# Patient Record
Sex: MALE | Race: OTHER | Employment: FULL TIME | URBAN - METROPOLITAN AREA
[De-identification: names, ages, dates, MRNs, and addresses within clinical notes are randomized per-mention and may not be internally consistent; named-entity substitution may affect disease eponyms.]

---

## 2017-01-17 ENCOUNTER — ALLSCRIPTS OFFICE VISIT (OUTPATIENT)
Dept: OTHER | Facility: OTHER | Age: 37
End: 2017-01-17

## 2017-01-17 DIAGNOSIS — I10 ESSENTIAL (PRIMARY) HYPERTENSION: ICD-10-CM

## 2017-04-04 ENCOUNTER — APPOINTMENT (OUTPATIENT)
Dept: LAB | Facility: CLINIC | Age: 37
End: 2017-04-04
Payer: COMMERCIAL

## 2017-04-04 ENCOUNTER — GENERIC CONVERSION - ENCOUNTER (OUTPATIENT)
Dept: OTHER | Facility: OTHER | Age: 37
End: 2017-04-04

## 2017-04-04 DIAGNOSIS — I10 ESSENTIAL (PRIMARY) HYPERTENSION: ICD-10-CM

## 2017-04-04 LAB
ALBUMIN SERPL BCP-MCNC: 4.1 G/DL (ref 3.5–5)
ALP SERPL-CCNC: 106 U/L (ref 46–116)
ALT SERPL W P-5'-P-CCNC: 45 U/L (ref 12–78)
ANION GAP SERPL CALCULATED.3IONS-SCNC: 6 MMOL/L (ref 4–13)
AST SERPL W P-5'-P-CCNC: 25 U/L (ref 5–45)
BILIRUB SERPL-MCNC: 0.56 MG/DL (ref 0.2–1)
BUN SERPL-MCNC: 11 MG/DL (ref 5–25)
CALCIUM SERPL-MCNC: 8.9 MG/DL (ref 8.3–10.1)
CHLORIDE SERPL-SCNC: 105 MMOL/L (ref 100–108)
CHOLEST SERPL-MCNC: 235 MG/DL (ref 50–200)
CO2 SERPL-SCNC: 27 MMOL/L (ref 21–32)
CREAT SERPL-MCNC: 0.81 MG/DL (ref 0.6–1.3)
CREAT UR-MCNC: 219 MG/DL
GFR SERPL CREATININE-BSD FRML MDRD: >60 ML/MIN/1.73SQ M
GLUCOSE P FAST SERPL-MCNC: 82 MG/DL (ref 65–99)
HDLC SERPL-MCNC: 37 MG/DL (ref 40–60)
LDLC SERPL CALC-MCNC: 162 MG/DL (ref 0–100)
POTASSIUM SERPL-SCNC: 3.9 MMOL/L (ref 3.5–5.3)
PROT SERPL-MCNC: 8.3 G/DL (ref 6.4–8.2)
PROT UR-MCNC: 12 MG/DL
PROT/CREAT UR: 0.05 MG/G{CREAT} (ref 0–0.1)
SODIUM SERPL-SCNC: 138 MMOL/L (ref 136–145)
TRIGL SERPL-MCNC: 181 MG/DL
TSH SERPL DL<=0.05 MIU/L-ACNC: 0.53 UIU/ML (ref 0.36–3.74)

## 2017-04-04 PROCEDURE — 82570 ASSAY OF URINE CREATININE: CPT

## 2017-04-04 PROCEDURE — 84443 ASSAY THYROID STIM HORMONE: CPT

## 2017-04-04 PROCEDURE — 80053 COMPREHEN METABOLIC PANEL: CPT

## 2017-04-04 PROCEDURE — 36415 COLL VENOUS BLD VENIPUNCTURE: CPT

## 2017-04-04 PROCEDURE — 84156 ASSAY OF PROTEIN URINE: CPT

## 2017-04-04 PROCEDURE — 80061 LIPID PANEL: CPT

## 2017-04-20 ENCOUNTER — ALLSCRIPTS OFFICE VISIT (OUTPATIENT)
Dept: OTHER | Facility: OTHER | Age: 37
End: 2017-04-20

## 2017-04-20 DIAGNOSIS — E78.2 MIXED HYPERLIPIDEMIA: ICD-10-CM

## 2018-01-12 VITALS
SYSTOLIC BLOOD PRESSURE: 124 MMHG | DIASTOLIC BLOOD PRESSURE: 72 MMHG | HEART RATE: 78 BPM | OXYGEN SATURATION: 98 % | WEIGHT: 212 LBS | BODY MASS INDEX: 31.4 KG/M2 | HEIGHT: 69 IN

## 2018-01-12 NOTE — RESULT NOTES
Verified Results  (1) COMPREHENSIVE METABOLIC PANEL [id number] 48:00NC Uday Sawant Order Number: [id number]     Test Name Result Flag Reference   SODIUM 138 mmol/L  136-145   POTASSIUM 3 9 mmol/L  3 5-5 3   CHLORIDE 105 mmol/L  100-108   CARBON DIOXIDE 27 mmol/L  21-32   ANION GAP (CALC) 6 mmol/L  4-13   BLOOD UREA NITROGEN 11 mg/dL  5-25   CREATININE 0 81 mg/dL  0 60-1 30   Standardized to IDMS reference method   CALCIUM 8 9 mg/dL  8 3-10 1   BILI, TOTAL 0 56 mg/dL  0 20-1 00   ALK PHOSPHATAS 106 U/L     ALT (SGPT) 45 U/L  12-78   AST(SGOT) 25 U/L  5-45   ALBUMIN 4 1 g/dL  3 5-5 0   TOTAL PROTEIN 8 3 g/dL H 6 4-8 2   eGFR Non-African American      >60 0 ml/min/1 73sq m   - Patient Instructions: This is a fasting blood test  Water,black tea or black  coffee only after 9:00pm the night before test Drink 2 glasses of water the morning of test - Patient Instructions: This bloodwork is non-fasting  Please drink two glasses of   water morning of bloodwork  National Kidney Disease Education Program recommendations are as follows:  GFR calculation is accurate only with a steady state creatinine  Chronic Kidney disease less than 60 ml/min/1 73 sq  meters  Kidney failure less than 15 ml/min/1 73 sq  meters  GLUCOSE FASTING 82 mg/dL  65-99     (1) LIPID PANEL, FASTING [id number] 08:55AM Uday Sawant Order Number: [id number]     Test Name Result Flag Reference   CHOLESTEROL 235 mg/dL H    HDL,DIRECT 37 mg/dL L 40-60   Specimen collection should occur prior to Metamizole administration due to the potential for falsely depressed results  LDL CHOLESTEROL CALCULATED 162 mg/dL H 0-100   - Patient Instructions: This is a fasting blood test  Water,black tea or black  coffee only after 9:00pm the night before test   Drink 2 glasses of water the morning of test     - Patient Instructions:  This is a fasting blood test  Water,black tea or black  coffee only after 9:00pm the night before test Drink 2 glasses of water the morning of test - Patient Instructions: This bloodwork is non-fasting  Please drink two glasses of   water morning of bloodwork  Triglyceride:         Normal              <150 mg/dl       Borderline High    150-199 mg/dl       High               200-499 mg/dl       Very High          >499 mg/dl  Cholesterol:         Desirable        <200 mg/dl      Borderline High  200-239 mg/dl      High             >239 mg/dl  HDL Cholesterol:        High    >59 mg/dL      Low     <41 mg/dL  LDL CALCULATED:    This screening LDL is a calculated result  It does not have the accuracy of the Direct Measured LDL in the monitoring of patients with hyperlipidemia and/or statin therapy  Direct Measure LDL (SRN262) must be ordered separately in these patients  TRIGLYCERIDES 181 mg/dL H <=150   Specimen collection should occur prior to N-Acetylcysteine or Metamizole administration due to the potential for falsely depressed results  (1) TSH 04Apr2017 08:55AM Reji Colon Order Number: [id number]     Test Name Result Flag Reference   TSH 0 530 uIU/mL  0 358-3 740   - Patient Instructions: This bloodwork is non-fasting  Please drink two glasses of water morning of bloodwork  - Patient Instructions: This is a fasting blood test  Water,black tea or black  coffee only after 9:00pm the night before test Drink 2 glasses of water the morning of test - Patient Instructions: This bloodwork is non-fasting  Please drink two glasses of   water morning of bloodwork  Patients undergoing fluorescein dye angiography may retain small amounts of fluorescein in the body for 48-72 hours post procedure  Samples containing fluorescein can produce falsely depressed TSH values  If the patient had this procedure,a specimen should be resubmitted post fluorescein clearance       (1) URINE PROTEIN CREATININE RATIO [id number] 08:55AM Reji Colon Order Number: [id number]     Test Name Result Flag Reference   CREATININE URINE 219 0 mg/dL     URINE PROTEIN:CREATININE RATIO 0 05  0 00-0 10   URINE PROTEIN 12 mg/dL         Discussion/Summary   inform chol is elevated    will discuss at f/u

## 2018-01-13 VITALS
BODY MASS INDEX: 32.92 KG/M2 | DIASTOLIC BLOOD PRESSURE: 78 MMHG | WEIGHT: 222.25 LBS | HEART RATE: 72 BPM | HEIGHT: 69 IN | OXYGEN SATURATION: 95 % | SYSTOLIC BLOOD PRESSURE: 120 MMHG

## 2018-03-22 ENCOUNTER — TELEPHONE (OUTPATIENT)
Dept: FAMILY MEDICINE CLINIC | Facility: CLINIC | Age: 38
End: 2018-03-22

## 2018-03-23 DIAGNOSIS — Z00.00 WELL ADULT EXAM: Primary | ICD-10-CM

## 2018-04-10 ENCOUNTER — LAB (OUTPATIENT)
Dept: LAB | Facility: CLINIC | Age: 38
End: 2018-04-10
Payer: COMMERCIAL

## 2018-04-10 DIAGNOSIS — Z00.00 WELL ADULT EXAM: ICD-10-CM

## 2018-04-10 LAB
ALBUMIN SERPL BCP-MCNC: 4.1 G/DL (ref 3.5–5)
ALP SERPL-CCNC: 88 U/L (ref 46–116)
ALT SERPL W P-5'-P-CCNC: 39 U/L (ref 12–78)
ANION GAP SERPL CALCULATED.3IONS-SCNC: 5 MMOL/L (ref 4–13)
AST SERPL W P-5'-P-CCNC: 25 U/L (ref 5–45)
BASOPHILS # BLD AUTO: 0.03 THOUSANDS/ΜL (ref 0–0.1)
BASOPHILS NFR BLD AUTO: 0 % (ref 0–1)
BILIRUB SERPL-MCNC: 0.47 MG/DL (ref 0.2–1)
BUN SERPL-MCNC: 14 MG/DL (ref 5–25)
CALCIUM SERPL-MCNC: 8.4 MG/DL
CHLORIDE SERPL-SCNC: 107 MMOL/L (ref 100–108)
CHOLEST SERPL-MCNC: 236 MG/DL (ref 50–200)
CO2 SERPL-SCNC: 26 MMOL/L (ref 21–32)
CREAT SERPL-MCNC: 0.76 MG/DL (ref 0.6–1.3)
EOSINOPHIL # BLD AUTO: 0.13 THOUSAND/ΜL (ref 0–0.61)
EOSINOPHIL NFR BLD AUTO: 2 % (ref 0–6)
ERYTHROCYTE [DISTWIDTH] IN BLOOD BY AUTOMATED COUNT: 12 % (ref 11.6–15.1)
GFR SERPL CREATININE-BSD FRML MDRD: 115 ML/MIN/1.73SQ M
GLUCOSE P FAST SERPL-MCNC: 84 MG/DL (ref 65–99)
HCT VFR BLD AUTO: 43.3 % (ref 36.5–49.3)
HDLC SERPL-MCNC: 46 MG/DL (ref 40–60)
HGB BLD-MCNC: 13.2 G/DL (ref 12–17)
LDLC SERPL CALC-MCNC: 158 MG/DL (ref 0–100)
LYMPHOCYTES # BLD AUTO: 1.62 THOUSANDS/ΜL (ref 0.6–4.47)
LYMPHOCYTES NFR BLD AUTO: 23 % (ref 14–44)
MCH RBC QN AUTO: 27.9 PG (ref 26.8–34.3)
MCHC RBC AUTO-ENTMCNC: 30.5 G/DL (ref 31.4–37.4)
MCV RBC AUTO: 92 FL (ref 82–98)
MONOCYTES # BLD AUTO: 0.35 THOUSAND/ΜL (ref 0.17–1.22)
MONOCYTES NFR BLD AUTO: 5 % (ref 4–12)
NEUTROPHILS # BLD AUTO: 5.03 THOUSANDS/ΜL (ref 1.85–7.62)
NEUTS SEG NFR BLD AUTO: 70 % (ref 43–75)
NRBC BLD AUTO-RTO: 0 /100 WBCS
PLATELET # BLD AUTO: 360 THOUSANDS/UL (ref 149–390)
PMV BLD AUTO: 10.1 FL (ref 8.9–12.7)
POTASSIUM SERPL-SCNC: 4.2 MMOL/L (ref 3.5–5.3)
PROT SERPL-MCNC: 8.4 G/DL (ref 6.4–8.2)
RBC # BLD AUTO: 4.73 MILLION/UL (ref 3.88–5.62)
SODIUM SERPL-SCNC: 138 MMOL/L (ref 136–145)
TRIGL SERPL-MCNC: 159 MG/DL
TSH SERPL DL<=0.05 MIU/L-ACNC: 0.88 UIU/ML (ref 0.36–3.74)
WBC # BLD AUTO: 7.18 THOUSAND/UL (ref 4.31–10.16)

## 2018-04-10 PROCEDURE — 80061 LIPID PANEL: CPT

## 2018-04-10 PROCEDURE — 85025 COMPLETE CBC W/AUTO DIFF WBC: CPT

## 2018-04-10 PROCEDURE — 36415 COLL VENOUS BLD VENIPUNCTURE: CPT

## 2018-04-10 PROCEDURE — 84443 ASSAY THYROID STIM HORMONE: CPT

## 2018-04-10 PROCEDURE — 80053 COMPREHEN METABOLIC PANEL: CPT

## 2018-04-19 ENCOUNTER — OFFICE VISIT (OUTPATIENT)
Dept: FAMILY MEDICINE CLINIC | Facility: CLINIC | Age: 38
End: 2018-04-19
Payer: COMMERCIAL

## 2018-04-19 VITALS
DIASTOLIC BLOOD PRESSURE: 84 MMHG | WEIGHT: 203 LBS | OXYGEN SATURATION: 98 % | HEIGHT: 68 IN | HEART RATE: 78 BPM | SYSTOLIC BLOOD PRESSURE: 140 MMHG | BODY MASS INDEX: 30.77 KG/M2

## 2018-04-19 DIAGNOSIS — R07.89 ATYPICAL CHEST PAIN: Primary | ICD-10-CM

## 2018-04-19 DIAGNOSIS — E78.2 MIXED HYPERLIPIDEMIA: ICD-10-CM

## 2018-04-19 DIAGNOSIS — I10 ESSENTIAL HYPERTENSION: ICD-10-CM

## 2018-04-19 PROCEDURE — 99214 OFFICE O/P EST MOD 30 MIN: CPT | Performed by: FAMILY MEDICINE

## 2018-04-19 RX ORDER — ROSUVASTATIN CALCIUM 5 MG/1
5 TABLET, COATED ORAL DAILY
Qty: 90 TABLET | Refills: 0 | Status: SHIPPED | OUTPATIENT
Start: 2018-04-19 | End: 2018-07-24 | Stop reason: SDUPTHER

## 2018-04-19 RX ORDER — LISINOPRIL 5 MG/1
5 TABLET ORAL DAILY
Qty: 90 TABLET | Refills: 0 | Status: SHIPPED | OUTPATIENT
Start: 2018-04-19 | End: 2018-05-02 | Stop reason: SDUPTHER

## 2018-04-19 RX ORDER — SENNOSIDES 8.6 MG
650 CAPSULE ORAL EVERY 8 HOURS PRN
Qty: 30 TABLET | Refills: 0 | Status: SHIPPED | OUTPATIENT
Start: 2018-04-19 | End: 2018-04-19 | Stop reason: CLARIF

## 2018-04-19 RX ORDER — ATORVASTATIN CALCIUM 10 MG/1
1 TABLET, FILM COATED ORAL
COMMUNITY
Start: 2017-04-20 | End: 2018-05-10 | Stop reason: SDUPTHER

## 2018-04-19 RX ORDER — LISINOPRIL 5 MG/1
1 TABLET ORAL DAILY
COMMUNITY
Start: 2017-08-30 | End: 2018-04-19 | Stop reason: SDUPTHER

## 2018-04-19 NOTE — PROGRESS NOTES
Assessment/Plan:   atypical chest pain  Discussed plan of care with patient, reviewed family history, discussed lack of compliance with control of cholesterol and hypertension,  Recommend restarting medications discussed goals of therapy in regard to hypertension reviewed standard of care for hyperlipidemia  In light of significant family history for cardiac disease will seek out evaluation with Cardiology  Reviewed dietary recommendations Mediterranean recommended baby aspirin  Daily, advise any changes to go to the emergency room   Diagnoses and all orders for this visit:    Atypical chest pain  -     Ambulatory referral to Cardiology; Future  -     Discontinue: acetaminophen (TYLENOL) 650 mg CR tablet; Take 1 tablet (650 mg total) by mouth every 8 (eight) hours as needed for mild pain  -     CK (with reflex to MB); Future    Mixed hyperlipidemia  -     Ambulatory referral to Cardiology; Future  -     Discontinue: acetaminophen (TYLENOL) 650 mg CR tablet; Take 1 tablet (650 mg total) by mouth every 8 (eight) hours as needed for mild pain    Essential hypertension  -     Ambulatory referral to Cardiology; Future  -     lisinopril (ZESTRIL) 5 mg tablet; Take 1 tablet (5 mg total) by mouth daily  -     rosuvastatin (CRESTOR) 5 mg tablet; Take 1 tablet (5 mg total) by mouth daily  -     AST; Future  -     ALT; Future  -     BUN; Future  -     Discontinue: acetaminophen (TYLENOL) 650 mg CR tablet; Take 1 tablet (650 mg total) by mouth every 8 (eight) hours as needed for mild pain  -     BUN; Future    Other orders  -     atorvastatin (LIPITOR) 10 mg tablet; Take 1 tablet by mouth  -     Discontinue: lisinopril (ZESTRIL) 5 mg tablet; Take 1 tablet by mouth daily              Subjective:      Patient ID: Simin Loco is a 43 y o  male      Here to f/u on the labs   Has been off the med in the hope Of not needing the med, but is concerned about my heart number overall health, has been working out exercising  regular much more watching diet, staying away from carbohydrates, has lost some weight  Brother recently passed secondary to MI 49yr , informed enlarged heart,  Apparently per the mother he also had stopped his medicines for high blood pressure and cholesterol  Since passing of the brother has been under increased stress , left job to seek out self employment ,      Has been having chest pain intermittant  Mid, without shortness of breath without radiation  Negative diaphoresis nausea vomiting associated,  IM really not sure  If it is in my head, has not stopped me from working out has not stopped me from going to the gym        The following portions of the patient's history were reviewed and updated as appropriate:   He has a past medical history of Hyperlipidemia ,   does not have a problem list on file  ,   has no past surgical history on file  ,  family history includes Diabetes in his father; Heart disease in his father; Hypertension in his mother  ,   reports that he has quit smoking  He has never used smokeless tobacco  He reports that he drinks alcohol  His drug history is not on file  ,  has No Known Allergies         Review of Systems   Constitutional: Negative for appetite change, chills, fever and unexpected weight change  HENT: Negative for congestion, dental problem, ear pain, hearing loss, postnasal drip, rhinorrhea, sinus pain, sinus pressure, sneezing, sore throat, tinnitus and voice change  Eyes: Negative for visual disturbance  Respiratory: Negative for apnea, cough, chest tightness and shortness of breath  Cardiovascular: Positive for chest pain  Negative for palpitations and leg swelling  Gastrointestinal: Negative for abdominal pain, blood in stool, constipation, diarrhea, nausea and vomiting  Endocrine: Negative for cold intolerance, heat intolerance, polydipsia, polyphagia and polyuria  Genitourinary: Negative for decreased urine volume, difficulty urinating, dysuria, frequency and hematuria  Musculoskeletal: Negative for arthralgias, back pain, gait problem, joint swelling and myalgias  Skin: Negative for color change, rash and wound  Allergic/Immunologic: Negative for environmental allergies and food allergies  Neurological: Negative for dizziness, syncope, weakness, light-headedness, numbness and headaches  Hematological: Negative for adenopathy  Does not bruise/bleed easily  Psychiatric/Behavioral: Negative for sleep disturbance and suicidal ideas  The patient is not nervous/anxious  Objective:  Vitals:    04/19/18 1109   BP: 140/84   Pulse: 78   SpO2: 98%      Physical Exam   Constitutional: He is oriented to person, place, and time  He appears well-developed and well-nourished  HENT:   Head: Normocephalic and atraumatic  Eyes: EOM are normal    Neck: Normal range of motion  Neck supple  Cardiovascular: Normal rate, regular rhythm and normal heart sounds  Pulmonary/Chest: Effort normal and breath sounds normal    Abdominal: Soft  Bowel sounds are normal    Musculoskeletal: Normal range of motion  Neurological: He is alert and oriented to person, place, and time  He has normal reflexes  Skin: Skin is warm and dry  Psychiatric: He has a normal mood and affect   His behavior is normal  Judgment and thought content normal     Nervous , perseverating over brothers passing

## 2018-04-25 ENCOUNTER — TELEPHONE (OUTPATIENT)
Dept: CARDIOLOGY CLINIC | Facility: CLINIC | Age: 38
End: 2018-04-25

## 2018-04-25 NOTE — TELEPHONE ENCOUNTER
I did not see that in the system, when I checked again I saw patient has two accounts  The patients wife does not want him to wait that long  IS it ok for patient to wait till 5/30?

## 2018-04-25 NOTE — TELEPHONE ENCOUNTER
Patients wife called and would like to schedule an appt for patient ASAP  Patients wife stated that patient has a referral for patient to be see ASAP  Patient is being referred for Essential Hypertension & Typical chest pain  PT does have a  family history of heart disease  PT is being referred by primary Dr Andi Marmolejo patient will be a NEWPT  PATIENTS  # [phone] Please advise were patient can be seen

## 2018-05-02 ENCOUNTER — CLINICAL SUPPORT (OUTPATIENT)
Dept: FAMILY MEDICINE CLINIC | Facility: CLINIC | Age: 38
End: 2018-05-02

## 2018-05-02 VITALS — SYSTOLIC BLOOD PRESSURE: 142 MMHG | DIASTOLIC BLOOD PRESSURE: 90 MMHG

## 2018-05-02 DIAGNOSIS — Z01.30 BLOOD PRESSURE CHECK: Primary | ICD-10-CM

## 2018-05-02 DIAGNOSIS — I10 ESSENTIAL HYPERTENSION: ICD-10-CM

## 2018-05-02 RX ORDER — LISINOPRIL 10 MG/1
10 TABLET ORAL DAILY
Qty: 30 TABLET | Refills: 3 | Status: SHIPPED | OUTPATIENT
Start: 2018-05-02 | End: 2018-07-03 | Stop reason: SDUPTHER

## 2018-05-10 ENCOUNTER — OFFICE VISIT (OUTPATIENT)
Dept: CARDIOLOGY CLINIC | Facility: CLINIC | Age: 38
End: 2018-05-10
Payer: COMMERCIAL

## 2018-05-10 VITALS
DIASTOLIC BLOOD PRESSURE: 96 MMHG | HEIGHT: 68 IN | WEIGHT: 203.6 LBS | SYSTOLIC BLOOD PRESSURE: 144 MMHG | BODY MASS INDEX: 30.86 KG/M2 | HEART RATE: 78 BPM | OXYGEN SATURATION: 96 %

## 2018-05-10 DIAGNOSIS — R07.89 ATYPICAL CHEST PAIN: ICD-10-CM

## 2018-05-10 DIAGNOSIS — I20.8 OTHER FORMS OF ANGINA PECTORIS (HCC): Primary | ICD-10-CM

## 2018-05-10 DIAGNOSIS — E78.2 MIXED HYPERLIPIDEMIA: ICD-10-CM

## 2018-05-10 DIAGNOSIS — I10 ESSENTIAL HYPERTENSION: ICD-10-CM

## 2018-05-10 PROBLEM — I25.9 CHEST PAIN DUE TO MYOCARDIAL ISCHEMIA: Status: ACTIVE | Noted: 2018-05-10

## 2018-05-10 PROCEDURE — 93000 ELECTROCARDIOGRAM COMPLETE: CPT | Performed by: INTERNAL MEDICINE

## 2018-05-10 PROCEDURE — 99203 OFFICE O/P NEW LOW 30 MIN: CPT | Performed by: INTERNAL MEDICINE

## 2018-05-10 RX ORDER — MULTIVITAMIN
1 TABLET ORAL DAILY
COMMUNITY

## 2018-05-10 RX ORDER — ASPIRIN 81 MG/1
81 TABLET ORAL DAILY
Qty: 90 TABLET | Refills: 3 | Status: SHIPPED | OUTPATIENT
Start: 2018-05-10 | End: 2019-05-31 | Stop reason: SDUPTHER

## 2018-05-10 NOTE — LETTER
May 10, 2018     Vicky Hanna, [address] [facility] [place] [address]    Patient: Remy Nesbitt   YOB: 1980   Date of Visit: 5/10/2018       Dear Dr Santosh Rosas: Thank you for referring Remy Nesbitt to me for evaluation  Below are my notes for this consultation  If you have questions, please do not hesitate to call me  I look forward to following your patient along with you  Sincerely,        Jesus Lopez MD        CC: No Recipients  Jesus Lopez MD  5/10/2018 12:46 PM  Sign at close encounter                                             Cardiology Consultation     Remy Nesbitt  [id number]  1980  [address] [place] [address]    1  Atypical chest pain  Ambulatory referral to Cardiology    POCT ECG   2  Mixed hyperlipidemia  Ambulatory referral to Cardiology   3  Essential hypertension  Ambulatory referral to Cardiology     C/C:  Chest pain and shortness of breath    HPI:  28-year-old male with past medical history of hypertension, hyperlipidemia cough and strong family history of premature coronary disease in father and brother comes to office for evaluation of new onset chest pain and shortness of breath  Patient says he gets episodes of sudden onset chest heaviness associated with shortness of breath at rest that lasts for about 15-20 minutes and resolves spontaneously with no treatment  He states the pain started over last few weeks after his brother  of heart attack  Patient has also lost his job and is under a lot of stress  He describes chest pain as pressure-like located in substernal region, mild-to-moderate intensity, no radiation, no aggravating or relieving factors  Patient does exercise every day and denies having any chest pain or shortness of breath on exertion  He denies other cardiac symptoms including palpitations, dizziness, syncope    He denies having any lower extremity edema, orthopnea paroxysmal nocturnal dyspnea  EKG showed normal sinus rhythm, normal EKG    Past Medical History:   Diagnosis Date    Atypical chest pain     Hyperlipidemia     last assessed 1/17/17     Social History     Social History    Marital status: Single     Spouse name: N/A    Number of children: N/A    Years of education: N/A     Occupational History    employed      Social History Main Topics    Smoking status: Former Smoker    Smokeless tobacco: Never Used    Alcohol use Yes      Comment: social    Drug use: No    Sexual activity: Not on file     Other Topics Concern    Not on file     Social History Narrative    Always uses seat belt     Daily caffeinated coffee consumption    Lives with family      Family History   Problem Relation Age of Onset    Hypertension Mother     Heart disease Father      cardiac d/o    Diabetes Father     Heart attack Brother      History reviewed  No pertinent surgical history      Current Outpatient Prescriptions:     lisinopril (ZESTRIL) 10 mg tablet, Take 1 tablet (10 mg total) by mouth daily, Disp: 30 tablet, Rfl: 3    Multiple Vitamin (MULTIVITAMIN) tablet, Take 1 tablet by mouth daily, Disp: , Rfl:     Omega-3 Fatty Acids (OMEGA 3 500 PO), Take by mouth daily, Disp: , Rfl:     rosuvastatin (CRESTOR) 5 mg tablet, Take 1 tablet (5 mg total) by mouth daily, Disp: 90 tablet, Rfl: 0  No Known Allergies  Vitals:    05/10/18 1142   BP: 144/96   Pulse: 78   SpO2: 96%   Weight: 92 4 kg (203 lb 9 6 oz)   Height: 5' 8" (1 727 m)       Labs:  Appointment on 04/10/2018   Component Date Value    Cholesterol 04/10/2018 236*    Triglycerides 04/10/2018 159*    HDL, Direct 04/10/2018 46     LDL Calculated 04/10/2018 158*    WBC 04/10/2018 7 18     RBC 04/10/2018 4 73     Hemoglobin 04/10/2018 13 2     Hematocrit 04/10/2018 43 3     MCV 04/10/2018 92     MCH 04/10/2018 27 9     MCHC 04/10/2018 30 5*    RDW 04/10/2018 12 0     MPV 04/10/2018 10 1     Platelets 36/19/1915 360     nRBC 04/10/2018 0     Neutrophils Relative 04/10/2018 70     Lymphocytes Relative 04/10/2018 23     Monocytes Relative 04/10/2018 5     Eosinophils Relative 04/10/2018 2     Basophils Relative 04/10/2018 0     Neutrophils Absolute 04/10/2018 5 03     Lymphocytes Absolute 04/10/2018 1 62     Monocytes Absolute 04/10/2018 0 35     Eosinophils Absolute 04/10/2018 0 13     Basophils Absolute 04/10/2018 0 03     Sodium 04/10/2018 138     Potassium 04/10/2018 4 2     Chloride 04/10/2018 107     CO2 04/10/2018 26     Anion Gap 04/10/2018 5     BUN 04/10/2018 14     Creatinine 04/10/2018 0 76     Glucose, Fasting 04/10/2018 84     Calcium 04/10/2018 8 4     AST 04/10/2018 25     ALT 04/10/2018 39     Alkaline Phosphatase 04/10/2018 88     Total Protein 04/10/2018 8 4*    Albumin 04/10/2018 4 1     Total Bilirubin 04/10/2018 0 47     eGFR 04/10/2018 115     TSH 3RD GENERATON 04/10/2018 0 883      Imaging: No results found  Review of Systems:  Review of Systems   Constitutional: Negative for diaphoresis and fatigue  HENT: Negative for congestion and facial swelling  Eyes: Negative for photophobia and visual disturbance  Respiratory: Positive for shortness of breath  Cardiovascular: Positive for chest pain  Negative for palpitations and leg swelling  Gastrointestinal: Negative for abdominal pain and blood in stool  Endocrine: Negative for cold intolerance and heat intolerance  Musculoskeletal: Negative for arthralgias and myalgias  Skin: Negative for pallor and rash  Neurological: Negative for dizziness and syncope  Physical Exam:  Physical Exam   Constitutional: He is oriented to person, place, and time  He appears well-developed and well-nourished  HENT:   Head: Normocephalic and atraumatic  Eyes: Conjunctivae and EOM are normal  Pupils are equal, round, and reactive to light  Neck: Normal range of motion  Neck supple  No JVD present  No thyromegaly present  Cardiovascular: Normal rate, regular rhythm, normal heart sounds and intact distal pulses  Exam reveals no gallop and no friction rub  No murmur heard  Pulmonary/Chest: Effort normal and breath sounds normal  No respiratory distress  He has no wheezes  He has no rales  Abdominal: Soft  Bowel sounds are normal  He exhibits no distension  There is no tenderness  Musculoskeletal: Normal range of motion  He exhibits no edema  Neurological: He is alert and oriented to person, place, and time  He has normal reflexes  Skin: Skin is warm and dry  Psychiatric: He has a normal mood and affect  Discussion/Summary:  Chest pain/shortness of breath:  Probably due to anxiety but it can also be due to ischemia considering his risk factors including smoking, hypertension and uncontrolled hyperlipidemia  I will get stress echocardiogram to rule out ischemia  Add low-dose aspirin    Hyperlipidemia, patient was recently started on Crestor by his PCP  Patient has a follow-up lipid profile ordered by his PCP  Hypertension, blood pressure is mildly elevated office but he says his blood pressure usually in 120s when he checks it at home  I asked patient to monitor his blood pressure and call me if his systolic blood pressures consistently more than 140 mm of mercury      Follow-up in 6 months if the stress test is normal

## 2018-05-10 NOTE — PROGRESS NOTES
Cardiology Consultation     Lucia Quarles  [id number]  1980  [facility] [address] [place] [address]    1  Atypical chest pain  Ambulatory referral to Cardiology    POCT ECG   2  Mixed hyperlipidemia  Ambulatory referral to Cardiology   3  Essential hypertension  Ambulatory referral to Cardiology     C/C:  Chest pain and shortness of breath    HPI:  39-year-old male with past medical history of hypertension, hyperlipidemia cough and strong family history of premature coronary disease in father and brother comes to office for evaluation of new onset chest pain and shortness of breath  Patient says he gets episodes of sudden onset chest heaviness associated with shortness of breath at rest that lasts for about 15-20 minutes and resolves spontaneously with no treatment  He states the pain started over last few weeks after his brother  of heart attack  Patient has also lost his job and is under a lot of stress  He describes chest pain as pressure-like located in substernal region, mild-to-moderate intensity, no radiation, no aggravating or relieving factors  Patient does exercise every day and denies having any chest pain or shortness of breath on exertion  He denies other cardiac symptoms including palpitations, dizziness, syncope  He denies having any lower extremity edema, orthopnea paroxysmal nocturnal dyspnea      EKG showed normal sinus rhythm, normal EKG    Past Medical History:   Diagnosis Date    Atypical chest pain     Hyperlipidemia     last assessed 17     Social History     Social History    Marital status: Single     Spouse name: N/A    Number of children: N/A    Years of education: N/A     Occupational History    employed      Social History Main Topics    Smoking status: Former Smoker    Smokeless tobacco: Never Used    Alcohol use Yes      Comment: social    Drug use: No    Sexual activity: Not on file     Other Topics Concern    Not on file     Social History Narrative    Always uses seat belt     Daily caffeinated coffee consumption    Lives with family      Family History   Problem Relation Age of Onset    Hypertension Mother     Heart disease Father      cardiac d/o    Diabetes Father     Heart attack Brother      History reviewed  No pertinent surgical history      Current Outpatient Prescriptions:     lisinopril (ZESTRIL) 10 mg tablet, Take 1 tablet (10 mg total) by mouth daily, Disp: 30 tablet, Rfl: 3    Multiple Vitamin (MULTIVITAMIN) tablet, Take 1 tablet by mouth daily, Disp: , Rfl:     Omega-3 Fatty Acids (OMEGA 3 500 PO), Take by mouth daily, Disp: , Rfl:     rosuvastatin (CRESTOR) 5 mg tablet, Take 1 tablet (5 mg total) by mouth daily, Disp: 90 tablet, Rfl: 0  No Known Allergies  Vitals:    05/10/18 1142   BP: 144/96   Pulse: 78   SpO2: 96%   Weight: 92 4 kg (203 lb 9 6 oz)   Height: 5' 8" (1 727 m)       Labs:  Appointment on 04/10/2018   Component Date Value    Cholesterol 04/10/2018 236*    Triglycerides 04/10/2018 159*    HDL, Direct 04/10/2018 46     LDL Calculated 04/10/2018 158*    WBC 04/10/2018 7 18     RBC 04/10/2018 4 73     Hemoglobin 04/10/2018 13 2     Hematocrit 04/10/2018 43 3     MCV 04/10/2018 92     MCH 04/10/2018 27 9     MCHC 04/10/2018 30 5*    RDW 04/10/2018 12 0     MPV 04/10/2018 10 1     Platelets 81/38/1884 360     nRBC 04/10/2018 0     Neutrophils Relative 04/10/2018 70     Lymphocytes Relative 04/10/2018 23     Monocytes Relative 04/10/2018 5     Eosinophils Relative 04/10/2018 2     Basophils Relative 04/10/2018 0     Neutrophils Absolute 04/10/2018 5 03     Lymphocytes Absolute 04/10/2018 1 62     Monocytes Absolute 04/10/2018 0 35     Eosinophils Absolute 04/10/2018 0 13     Basophils Absolute 04/10/2018 0 03     Sodium 04/10/2018 138     Potassium 04/10/2018 4 2     Chloride 04/10/2018 107     CO2 04/10/2018 26     Anion Gap 04/10/2018 5     BUN 04/10/2018 14     Creatinine 04/10/2018 0 76     Glucose, Fasting 04/10/2018 84     Calcium 04/10/2018 8 4     AST 04/10/2018 25     ALT 04/10/2018 39     Alkaline Phosphatase 04/10/2018 88     Total Protein 04/10/2018 8 4*    Albumin 04/10/2018 4 1     Total Bilirubin 04/10/2018 0 47     eGFR 04/10/2018 115     TSH 3RD GENERATON 04/10/2018 0 883      Imaging: No results found  Review of Systems:  Review of Systems   Constitutional: Negative for diaphoresis and fatigue  HENT: Negative for congestion and facial swelling  Eyes: Negative for photophobia and visual disturbance  Respiratory: Positive for shortness of breath  Cardiovascular: Positive for chest pain  Negative for palpitations and leg swelling  Gastrointestinal: Negative for abdominal pain and blood in stool  Endocrine: Negative for cold intolerance and heat intolerance  Musculoskeletal: Negative for arthralgias and myalgias  Skin: Negative for pallor and rash  Neurological: Negative for dizziness and syncope  Physical Exam:  Physical Exam   Constitutional: He is oriented to person, place, and time  He appears well-developed and well-nourished  HENT:   Head: Normocephalic and atraumatic  Eyes: Conjunctivae and EOM are normal  Pupils are equal, round, and reactive to light  Neck: Normal range of motion  Neck supple  No JVD present  No thyromegaly present  Cardiovascular: Normal rate, regular rhythm, normal heart sounds and intact distal pulses  Exam reveals no gallop and no friction rub  No murmur heard  Pulmonary/Chest: Effort normal and breath sounds normal  No respiratory distress  He has no wheezes  He has no rales  Abdominal: Soft  Bowel sounds are normal  He exhibits no distension  There is no tenderness  Musculoskeletal: Normal range of motion  He exhibits no edema  Neurological: He is alert and oriented to person, place, and time   He has normal reflexes  Skin: Skin is warm and dry  Psychiatric: He has a normal mood and affect  Discussion/Summary:  Chest pain/shortness of breath:  Probably due to anxiety but it can also be due to ischemia considering his risk factors including smoking, hypertension and uncontrolled hyperlipidemia  I will get stress echocardiogram to rule out ischemia  Add low-dose aspirin    Hyperlipidemia, patient was recently started on Crestor by his PCP  Patient has a follow-up lipid profile ordered by his PCP  Hypertension, blood pressure is mildly elevated office but he says his blood pressure usually in 120s when he checks it at home  I asked patient to monitor his blood pressure and call me if his systolic blood pressures consistently more than 140 mm of mercury      Follow-up in 6 months if the stress test is normal

## 2018-05-21 DIAGNOSIS — E78.2 MIXED HYPERLIPIDEMIA: ICD-10-CM

## 2018-05-21 DIAGNOSIS — I10 ESSENTIAL HYPERTENSION: ICD-10-CM

## 2018-05-21 DIAGNOSIS — I20.8 OTHER FORMS OF ANGINA PECTORIS (HCC): Primary | ICD-10-CM

## 2018-05-30 ENCOUNTER — APPOINTMENT (OUTPATIENT)
Dept: LAB | Facility: CLINIC | Age: 38
End: 2018-05-30
Payer: COMMERCIAL

## 2018-05-30 DIAGNOSIS — R07.89 ATYPICAL CHEST PAIN: ICD-10-CM

## 2018-05-30 DIAGNOSIS — I10 ESSENTIAL HYPERTENSION: ICD-10-CM

## 2018-05-30 LAB
ALT SERPL W P-5'-P-CCNC: 38 U/L (ref 12–78)
AST SERPL W P-5'-P-CCNC: 26 U/L (ref 5–45)
BUN SERPL-MCNC: 11 MG/DL (ref 5–25)
CK MB SERPL-MCNC: <1 % (ref 0–2.5)
CK MB SERPL-MCNC: <1 NG/ML (ref 0–5)
CK SERPL-CCNC: 168 U/L (ref 39–308)

## 2018-05-30 PROCEDURE — 84520 ASSAY OF UREA NITROGEN: CPT

## 2018-05-30 PROCEDURE — 82550 ASSAY OF CK (CPK): CPT

## 2018-05-30 PROCEDURE — 36415 COLL VENOUS BLD VENIPUNCTURE: CPT

## 2018-05-30 PROCEDURE — 84460 ALANINE AMINO (ALT) (SGPT): CPT

## 2018-05-30 PROCEDURE — 82553 CREATINE MB FRACTION: CPT

## 2018-05-30 PROCEDURE — 84450 TRANSFERASE (AST) (SGOT): CPT

## 2018-06-01 ENCOUNTER — TRANSCRIBE ORDERS (OUTPATIENT)
Dept: ADMINISTRATIVE | Facility: HOSPITAL | Age: 38
End: 2018-06-01

## 2018-06-01 ENCOUNTER — HOSPITAL ENCOUNTER (OUTPATIENT)
Dept: NON INVASIVE DIAGNOSTICS | Facility: HOSPITAL | Age: 38
Discharge: HOME | End: 2018-06-01
Payer: COMMERCIAL

## 2018-06-01 DIAGNOSIS — I10 ESSENTIAL HYPERTENSION: ICD-10-CM

## 2018-06-01 DIAGNOSIS — I20.8 OTHER FORMS OF ANGINA PECTORIS (HCC): ICD-10-CM

## 2018-06-01 DIAGNOSIS — E78.2 MIXED HYPERLIPIDEMIA: ICD-10-CM

## 2018-06-01 LAB
CHEST PAIN STATEMENT: NORMAL
MAX DIASTOLIC BP: 100 MMHG
MAX HEART RATE: 181 BPM
MAX PREDICTED HEART RATE: 181 BPM
MAX. SYSTOLIC BP: 169 MMHG
PROTOCOL NAME: NORMAL
REASON FOR TERMINATION: NORMAL
TARGET HR FORMULA: NORMAL
TIME IN EXERCISE PHASE: NORMAL

## 2018-06-01 PROCEDURE — 93306 TTE W/DOPPLER COMPLETE: CPT

## 2018-06-01 PROCEDURE — 93017 CV STRESS TEST TRACING ONLY: CPT

## 2018-06-02 PROCEDURE — 93018 CV STRESS TEST I&R ONLY: CPT | Performed by: INTERNAL MEDICINE

## 2018-06-02 PROCEDURE — 93016 CV STRESS TEST SUPVJ ONLY: CPT | Performed by: INTERNAL MEDICINE

## 2018-06-02 PROCEDURE — 93306 TTE W/DOPPLER COMPLETE: CPT | Performed by: INTERNAL MEDICINE

## 2018-06-04 ENCOUNTER — TELEPHONE (OUTPATIENT)
Dept: CARDIOLOGY CLINIC | Facility: CLINIC | Age: 38
End: 2018-06-04

## 2018-07-03 DIAGNOSIS — I10 ESSENTIAL HYPERTENSION: ICD-10-CM

## 2018-07-03 RX ORDER — LISINOPRIL 10 MG/1
10 TABLET ORAL DAILY
Qty: 30 TABLET | Refills: 3 | Status: SHIPPED | OUTPATIENT
Start: 2018-07-03 | End: 2019-03-02 | Stop reason: SDUPTHER

## 2018-07-24 DIAGNOSIS — I10 ESSENTIAL HYPERTENSION: ICD-10-CM

## 2018-07-25 RX ORDER — ROSUVASTATIN CALCIUM 5 MG/1
5 TABLET, COATED ORAL DAILY
Qty: 90 TABLET | Refills: 0 | Status: SHIPPED | OUTPATIENT
Start: 2018-07-25 | End: 2018-11-01 | Stop reason: SDUPTHER

## 2018-08-07 ENCOUNTER — OFFICE VISIT (OUTPATIENT)
Dept: FAMILY MEDICINE CLINIC | Facility: CLINIC | Age: 38
End: 2018-08-07
Payer: COMMERCIAL

## 2018-08-07 VITALS
WEIGHT: 210 LBS | HEART RATE: 87 BPM | TEMPERATURE: 97 F | HEIGHT: 68 IN | SYSTOLIC BLOOD PRESSURE: 130 MMHG | BODY MASS INDEX: 31.83 KG/M2 | OXYGEN SATURATION: 97 % | DIASTOLIC BLOOD PRESSURE: 84 MMHG

## 2018-08-07 DIAGNOSIS — H81.10 BENIGN PAROXYSMAL POSITIONAL VERTIGO, UNSPECIFIED LATERALITY: Primary | ICD-10-CM

## 2018-08-07 DIAGNOSIS — I10 ESSENTIAL HYPERTENSION: ICD-10-CM

## 2018-08-07 DIAGNOSIS — E78.2 MIXED HYPERLIPIDEMIA: ICD-10-CM

## 2018-08-07 DIAGNOSIS — Z11.4 SCREENING FOR HIV (HUMAN IMMUNODEFICIENCY VIRUS): ICD-10-CM

## 2018-08-07 PROCEDURE — 99214 OFFICE O/P EST MOD 30 MIN: CPT | Performed by: FAMILY MEDICINE

## 2018-08-07 PROCEDURE — 3079F DIAST BP 80-89 MM HG: CPT | Performed by: FAMILY MEDICINE

## 2018-08-07 PROCEDURE — 3008F BODY MASS INDEX DOCD: CPT | Performed by: FAMILY MEDICINE

## 2018-08-07 PROCEDURE — 3075F SYST BP GE 130 - 139MM HG: CPT | Performed by: FAMILY MEDICINE

## 2018-08-07 NOTE — PROGRESS NOTES
Assessment/Plan:         Diagnoses and all orders for this visit:    Benign paroxysmal positional vertigo, unspecified laterality  -     Comprehensive metabolic panel; Future  -     CBC and differential; Future  -     TSH, 3rd generation; Future    Mixed hyperlipidemia  -     Comprehensive metabolic panel; Future  -     CBC and differential; Future  -     Lipid panel; Future  -     TSH, 3rd generation; Future    Essential hypertension  -     Comprehensive metabolic panel; Future  -     CBC and differential; Future  -     Microalbumin / creatinine urine ratio  -     TSH, 3rd generation; Future    Screening for HIV (human immunodeficiency virus)  -     HIV 1/2 AG-AB combo; Future       Benign positional vertigo  Discussed causative factors, stress safety, discussed treatments, referred to video   hypertension   Stable, to continue current medications, encouraged continued diet modification low-salt sodium encouraged to continue checking blood pressures   hyperlipidemia will obtain updated lipid panel renal function        Subjective:      Patient ID: Zhao Landrum is a 43 y o  male      Complaining of dizziness over the past week  Occurs with laying down or rapid head turning  Resolved spontaneously within a minute holding had still   Denies vision changes blurriness denies loss negative chest pain shortness of breath planning on going to the gym as we this office, admits hydration levels could be better denies any change in medications actually since starting Crestor after missing a few doses dizziness has improved  Denies chest pain palpitations shortness of breath, admits to some anxiety admits to over thinking some things  Blood pressures have been within normal levels checks every to 3 weeks randomly numbers have been good  Cholesterol has been taking the Crestor as described above, has missed a few doses but back on plans on going to [place] vacation 2 weeks doing well      Dizziness   The current episode started in the past 7 days  The problem occurs intermittently  The problem has been gradually improving  Pertinent negatives include no abdominal pain, arthralgias, change in bowel habit, chest pain, chills, congestion, coughing, diaphoresis, fatigue, fever, headaches, joint swelling, myalgias, nausea, numbness, rash, sore throat, vertigo, visual change, vomiting or weakness  The following portions of the patient's history were reviewed and updated as appropriate:   He has a past medical history of Atypical chest pain and Hyperlipidemia ,   does not have any pertinent problems on file  ,   has no past surgical history on file  ,  family history includes Diabetes in his father; Heart attack in his brother; Heart disease in his father; Hypertension in his mother  ,   reports that he has quit smoking  He has never used smokeless tobacco  He reports that he drinks alcohol  He reports that he does not use drugs  ,  has No Known Allergies         Review of Systems   Constitutional: Negative for appetite change, chills, diaphoresis, fatigue, fever and unexpected weight change  HENT: Negative for congestion, dental problem, ear pain, hearing loss, postnasal drip, rhinorrhea, sinus pain, sinus pressure, sneezing, sore throat, tinnitus and voice change  Eyes: Negative for visual disturbance  Respiratory: Negative for apnea, cough, chest tightness and shortness of breath  Cardiovascular: Negative for chest pain, palpitations and leg swelling  Gastrointestinal: Negative for abdominal pain, blood in stool, change in bowel habit, constipation, diarrhea, nausea and vomiting  Endocrine: Negative for cold intolerance, heat intolerance, polydipsia, polyphagia and polyuria  Genitourinary: Negative for decreased urine volume, difficulty urinating, dysuria, frequency and hematuria  Musculoskeletal: Negative for arthralgias, back pain, gait problem, joint swelling and myalgias     Skin: Negative for color change, rash and wound  Allergic/Immunologic: Negative for environmental allergies and food allergies  Neurological: Positive for dizziness  Negative for vertigo, syncope, weakness, light-headedness, numbness and headaches  Hematological: Negative for adenopathy  Does not bruise/bleed easily  Psychiatric/Behavioral: Negative for sleep disturbance and suicidal ideas  The patient is not nervous/anxious  Objective:  Vitals:    08/07/18 0904   BP: 130/84   Pulse:    Temp:    SpO2:       Physical Exam   Constitutional: He is oriented to person, place, and time  He appears well-developed and well-nourished  HENT:   Head: Normocephalic and atraumatic  Eyes: EOM are normal    Neck: Normal range of motion  Neck supple  Cardiovascular: Normal rate, regular rhythm and normal heart sounds  Pulmonary/Chest: Effort normal and breath sounds normal    Musculoskeletal: Normal range of motion  Neurological: He is alert and oriented to person, place, and time  Skin: Skin is warm and dry  Psychiatric: He has a normal mood and affect   His behavior is normal  Judgment and thought content normal

## 2018-11-01 ENCOUNTER — OFFICE VISIT (OUTPATIENT)
Dept: CARDIOLOGY CLINIC | Facility: CLINIC | Age: 38
End: 2018-11-01
Payer: COMMERCIAL

## 2018-11-01 VITALS
HEART RATE: 89 BPM | BODY MASS INDEX: 32.28 KG/M2 | SYSTOLIC BLOOD PRESSURE: 118 MMHG | HEIGHT: 68 IN | OXYGEN SATURATION: 97 % | WEIGHT: 213 LBS | DIASTOLIC BLOOD PRESSURE: 80 MMHG

## 2018-11-01 DIAGNOSIS — E78.2 MIXED HYPERLIPIDEMIA: ICD-10-CM

## 2018-11-01 DIAGNOSIS — I10 ESSENTIAL HYPERTENSION: ICD-10-CM

## 2018-11-01 DIAGNOSIS — I20.8 OTHER FORMS OF ANGINA PECTORIS (HCC): Primary | ICD-10-CM

## 2018-11-01 PROCEDURE — 99214 OFFICE O/P EST MOD 30 MIN: CPT | Performed by: INTERNAL MEDICINE

## 2018-11-01 RX ORDER — ROSUVASTATIN CALCIUM 5 MG/1
5 TABLET, COATED ORAL DAILY
Qty: 90 TABLET | Refills: 3 | Status: SHIPPED | OUTPATIENT
Start: 2018-11-01 | End: 2019-05-07

## 2018-11-01 NOTE — PROGRESS NOTES
Cardiology Consultation     Manuel Dennis  [id number]  1980  [address] [facility] [facility] [address]    C/C:  Chest pain and shortness of breath     HPI:  76-year-old male with past medical history of hypertension, hyperlipidemia cough and strong family history of premature coronary disease in father and brother is here for followup of chest pain and shortness of breath  the episodes of chest pressure and shortness of breath improved since last visit  His blood pressure is also improved since last visit  He states the pain started over last few weeks after his brother  of heart attack  His brother was 52years old when he had his heart attack  He denies other cardiac symptoms including palpitations, dizziness, syncope  He denies having any lower extremity edema, orthopnea paroxysmal nocturnal dyspnea      EKG showed normal sinus rhythm, normal EKG    Patient Active Problem List   Diagnosis    Mixed hyperlipidemia    Essential hypertension    Chest pain due to myocardial ischemia     Past Medical History:   Diagnosis Date    Atypical chest pain     Hyperlipidemia     last assessed 17     Social History     Social History    Marital status: Single     Spouse name: N/A    Number of children: N/A    Years of education: N/A     Occupational History    employed      Social History Main Topics    Smoking status: Former Smoker    Smokeless tobacco: Never Used    Alcohol use Yes      Comment: social    Drug use: No    Sexual activity: Not on file     Other Topics Concern    Not on file     Social History Narrative    Always uses seat belt     Daily caffeinated coffee consumption    Lives with family      Family History   Problem Relation Age of Onset    Hypertension Mother     Heart disease Father         cardiac d/o    Diabetes Father     Heart attack Brother      History reviewed  No pertinent surgical history  Current Outpatient Prescriptions:     aspirin (ECOTRIN LOW STRENGTH) 81 mg EC tablet, Take 1 tablet (81 mg total) by mouth daily, Disp: 90 tablet, Rfl: 3    lisinopril (ZESTRIL) 10 mg tablet, Take 1 tablet (10 mg total) by mouth daily, Disp: 30 tablet, Rfl: 3    Multiple Vitamin (MULTIVITAMIN) tablet, Take 1 tablet by mouth daily, Disp: , Rfl:     Omega-3 Fatty Acids (OMEGA 3 500 PO), Take by mouth daily, Disp: , Rfl:     rosuvastatin (CRESTOR) 5 mg tablet, Take 1 tablet (5 mg total) by mouth daily, Disp: 90 tablet, Rfl: 0  No Known Allergies  Vitals:    11/01/18 1039   BP: 118/80   BP Location: Left arm   Patient Position: Sitting   Cuff Size: Adult   Pulse: 89   SpO2: 97%   Weight: 96 6 kg (213 lb)   Height: 5' 8" (1 727 m)       Labs:  No visits with results within 2 Month(s) from this visit  Latest known visit with results is:   Hospital Outpatient Visit on 06/01/2018   Component Date Value    Protocol Name 06/01/2018 Carol Wagner Time In Exercise Phase 06/01/2018 00:13:02     MAX  SYSTOLIC BP 65/26/1116 488     Max Diastolic Bp 22/87/7601 399     Max Heart Rate 06/01/2018 181     Max Predicted Heart Rate 06/01/2018 181     Reason for Termination 06/01/2018 Target Heart Rate Achieved     Test Indication 06/01/2018                      Value:Chest Discomfort  Dyspnea      Target Hr Formular 06/01/2018 (220 - Age)*100%     Chest Pain Statement 06/01/2018 none      Imaging: No results found  Review of Systems:  Review of Systems   Constitutional: Negative for diaphoresis and fatigue  HENT: Negative for congestion and facial swelling  Eyes: Negative for photophobia and visual disturbance  Respiratory: Negative for chest tightness and shortness of breath  Cardiovascular: Positive for chest pain  Negative for palpitations and leg swelling  Gastrointestinal: Negative for abdominal pain and blood in stool     Endocrine: Negative for cold intolerance and heat intolerance  Musculoskeletal: Negative for arthralgias and myalgias  Skin: Negative for pallor and rash  Neurological: Negative for dizziness and syncope  Physical Exam:  Physical Exam   Constitutional: He is oriented to person, place, and time  He appears well-developed and well-nourished  HENT:   Head: Normocephalic and atraumatic  Eyes: Pupils are equal, round, and reactive to light  Conjunctivae and EOM are normal    Neck: Normal range of motion  Neck supple  No JVD present  No thyromegaly present  Cardiovascular: Normal rate, regular rhythm, normal heart sounds and intact distal pulses  Exam reveals no gallop and no friction rub  No murmur heard  Pulmonary/Chest: Effort normal and breath sounds normal  No respiratory distress  He has no wheezes  He has no rales  Abdominal: Soft  Bowel sounds are normal  He exhibits no distension  There is no tenderness  Musculoskeletal: Normal range of motion  He exhibits no edema  Neurological: He is alert and oriented to person, place, and time  He has normal reflexes  Skin: Skin is warm and dry  Psychiatric: He has a normal mood and affect  Discussion/Summary:  Chest pain/shortness of breath:   chest pressure is improved significantly since last visit  Patient is less stressed than last visit  His stress test was normal   Echocardiogram was unremarkable  Patient continues to get occasional chest pressure which is probably due to stress  If the chest tightness worsens will consider cardiac catheterization  Cannot add beta-blocker as patient blood pressure is low      Hyperlipidemia, last LDL was 158  I will recheck lipid profile to evaluate response to Crestor        Hypertension:  Blood pressure is better controlled today  Blood pressure is probably high last visit due to stress  Will continue to monitor       Follow-up in 6 months

## 2018-11-02 ENCOUNTER — APPOINTMENT (OUTPATIENT)
Dept: LAB | Facility: CLINIC | Age: 38
End: 2018-11-02
Payer: COMMERCIAL

## 2018-11-02 DIAGNOSIS — E78.2 MIXED HYPERLIPIDEMIA: ICD-10-CM

## 2018-11-02 DIAGNOSIS — I10 ESSENTIAL HYPERTENSION: ICD-10-CM

## 2018-11-02 DIAGNOSIS — H81.10 BENIGN PAROXYSMAL POSITIONAL VERTIGO, UNSPECIFIED LATERALITY: ICD-10-CM

## 2018-11-02 DIAGNOSIS — Z11.4 SCREENING FOR HIV (HUMAN IMMUNODEFICIENCY VIRUS): ICD-10-CM

## 2018-11-02 LAB
ALBUMIN SERPL BCP-MCNC: 3.9 G/DL (ref 3.5–5)
ALP SERPL-CCNC: 90 U/L (ref 46–116)
ALT SERPL W P-5'-P-CCNC: 81 U/L (ref 12–78)
ANION GAP SERPL CALCULATED.3IONS-SCNC: 6 MMOL/L (ref 4–13)
AST SERPL W P-5'-P-CCNC: 49 U/L (ref 5–45)
BASOPHILS # BLD AUTO: 0.06 THOUSANDS/ΜL (ref 0–0.1)
BASOPHILS NFR BLD AUTO: 1 % (ref 0–1)
BILIRUB SERPL-MCNC: 0.66 MG/DL (ref 0.2–1)
BUN SERPL-MCNC: 10 MG/DL (ref 5–25)
CALCIUM SERPL-MCNC: 8.5 MG/DL (ref 8.3–10.1)
CHLORIDE SERPL-SCNC: 104 MMOL/L (ref 100–108)
CHOLEST SERPL-MCNC: 181 MG/DL (ref 50–200)
CO2 SERPL-SCNC: 25 MMOL/L (ref 21–32)
CREAT SERPL-MCNC: 0.72 MG/DL (ref 0.6–1.3)
EOSINOPHIL # BLD AUTO: 0.17 THOUSAND/ΜL (ref 0–0.61)
EOSINOPHIL NFR BLD AUTO: 3 % (ref 0–6)
ERYTHROCYTE [DISTWIDTH] IN BLOOD BY AUTOMATED COUNT: 11.8 % (ref 11.6–15.1)
GFR SERPL CREATININE-BSD FRML MDRD: 118 ML/MIN/1.73SQ M
GLUCOSE P FAST SERPL-MCNC: 85 MG/DL (ref 65–99)
HCT VFR BLD AUTO: 41 % (ref 36.5–49.3)
HDLC SERPL-MCNC: 41 MG/DL (ref 40–60)
HGB BLD-MCNC: 12.7 G/DL (ref 12–17)
IMM GRANULOCYTES # BLD AUTO: 0 THOUSAND/UL (ref 0–0.2)
IMM GRANULOCYTES NFR BLD AUTO: 0 % (ref 0–2)
LDLC SERPL CALC-MCNC: 94 MG/DL (ref 0–100)
LYMPHOCYTES # BLD AUTO: 2 THOUSANDS/ΜL (ref 0.6–4.47)
LYMPHOCYTES NFR BLD AUTO: 32 % (ref 14–44)
MCH RBC QN AUTO: 28.1 PG (ref 26.8–34.3)
MCHC RBC AUTO-ENTMCNC: 31 G/DL (ref 31.4–37.4)
MCV RBC AUTO: 91 FL (ref 82–98)
MONOCYTES # BLD AUTO: 0.44 THOUSAND/ΜL (ref 0.17–1.22)
MONOCYTES NFR BLD AUTO: 7 % (ref 4–12)
NEUTROPHILS # BLD AUTO: 3.62 THOUSANDS/ΜL (ref 1.85–7.62)
NEUTS SEG NFR BLD AUTO: 57 % (ref 43–75)
NRBC BLD AUTO-RTO: 0 /100 WBCS
PLATELET # BLD AUTO: 332 THOUSANDS/UL (ref 149–390)
PMV BLD AUTO: 10.3 FL (ref 8.9–12.7)
POTASSIUM SERPL-SCNC: 3.8 MMOL/L (ref 3.5–5.3)
PROT SERPL-MCNC: 7.9 G/DL (ref 6.4–8.2)
RBC # BLD AUTO: 4.52 MILLION/UL (ref 3.88–5.62)
SODIUM SERPL-SCNC: 135 MMOL/L (ref 136–145)
TRIGL SERPL-MCNC: 228 MG/DL
TSH SERPL DL<=0.05 MIU/L-ACNC: 0.81 UIU/ML (ref 0.36–3.74)
WBC # BLD AUTO: 6.29 THOUSAND/UL (ref 4.31–10.16)

## 2018-11-02 PROCEDURE — 85025 COMPLETE CBC W/AUTO DIFF WBC: CPT

## 2018-11-02 PROCEDURE — 80053 COMPREHEN METABOLIC PANEL: CPT

## 2018-11-02 PROCEDURE — 84443 ASSAY THYROID STIM HORMONE: CPT

## 2018-11-02 PROCEDURE — 80061 LIPID PANEL: CPT

## 2018-11-02 PROCEDURE — 87389 HIV-1 AG W/HIV-1&-2 AB AG IA: CPT

## 2018-11-02 PROCEDURE — 36415 COLL VENOUS BLD VENIPUNCTURE: CPT

## 2018-11-05 LAB — HIV 1+2 AB+HIV1 P24 AG SERPL QL IA: NORMAL

## 2018-11-07 ENCOUNTER — TELEPHONE (OUTPATIENT)
Dept: FAMILY MEDICINE CLINIC | Facility: CLINIC | Age: 38
End: 2018-11-07

## 2018-11-07 ENCOUNTER — OFFICE VISIT (OUTPATIENT)
Dept: FAMILY MEDICINE CLINIC | Facility: CLINIC | Age: 38
End: 2018-11-07
Payer: COMMERCIAL

## 2018-11-07 VITALS
BODY MASS INDEX: 32.28 KG/M2 | DIASTOLIC BLOOD PRESSURE: 88 MMHG | SYSTOLIC BLOOD PRESSURE: 130 MMHG | OXYGEN SATURATION: 96 % | WEIGHT: 213 LBS | HEIGHT: 68 IN | RESPIRATION RATE: 15 BRPM | TEMPERATURE: 97.7 F | HEART RATE: 85 BPM

## 2018-11-07 DIAGNOSIS — Z12.5 ENCOUNTER FOR PROSTATE CANCER SCREENING: ICD-10-CM

## 2018-11-07 DIAGNOSIS — E78.2 MIXED HYPERLIPIDEMIA: Primary | ICD-10-CM

## 2018-11-07 DIAGNOSIS — F43.9 SITUATIONAL STRESS: ICD-10-CM

## 2018-11-07 DIAGNOSIS — Z23 NEED FOR INFLUENZA VACCINATION: ICD-10-CM

## 2018-11-07 DIAGNOSIS — I10 ESSENTIAL HYPERTENSION: ICD-10-CM

## 2018-11-07 PROCEDURE — 99214 OFFICE O/P EST MOD 30 MIN: CPT | Performed by: FAMILY MEDICINE

## 2018-11-07 PROCEDURE — 90686 IIV4 VACC NO PRSV 0.5 ML IM: CPT

## 2018-11-07 PROCEDURE — 90471 IMMUNIZATION ADMIN: CPT

## 2018-11-07 NOTE — TELEPHONE ENCOUNTER
Pt called he was in to see you this morning   He would like to try the medication you mentioned for mental health,  Doesn't know the name of the medication  Can this be sent in or does he need another appt?

## 2018-11-07 NOTE — PROGRESS NOTES
Assessment/Plan:       Diagnoses and all orders for this visit:    Mixed hyperlipidemia  -     Comprehensive metabolic panel; Future  -     Lipid panel; Future    Essential hypertension  -     Comprehensive metabolic panel; Future  -     Lipid panel; Future  -     Urinalysis with reflex to microscopic; Future    Encounter for prostate cancer screening  -     PSA, Total Screen; Future  -     Urinalysis with reflex to microscopic; Future    Situational stress    Need for influenza vaccination  -     SYRINGE/SINGLE-DOSE VIAL: influenza vaccine, 2693-4347, quadrivalent, 0 5 mL, preservative-free, for patients 3+ yr (FLUZONE)          Hyperlipidemia  Above goals triglycerides elevated discussed correlation with dietary choices over past weeks recommend return to improved diet discussed choices, continue medications, would return fish oil/Omega 3, increase fiber intake  htn  Stable slightly elevated when recheck, Discussed goals of therapy main so blood pressure numbers in the 120s over 70s, reviewed medications indications and side effect profiles, discussed dosing, recommended diet modification such as salt and sodium restriction with weight loss program   Recommended regular routine exercise and stretching program  Situational stressors  Discussed issues at home work at a discussed depression versus anxiety encouraged to return to counseling/therapy which per patient admittedly helped deal with stressors    Subjective:      Patient ID: Nel Galeano is a 43 y o  male  Here to f/u on the labs   Was seen by the cardiology   Has been going thru a lot of shit , 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   Negative chest pain palpitations shortness of breath difficulty breathing cough lesions rash  Denies any urinary issues concerns negative change flow pattern or stream        The following portions of the patient's history were reviewed and updated as appropriate:   He has a past medical history of Atypical chest pain and Hyperlipidemia ,   does not have any pertinent problems on file  ,   has no past surgical history on file  ,  family history includes Diabetes in his father; Heart attack in his brother; Heart disease in his father; Hypertension in his mother  ,   reports that he has quit smoking  He has never used smokeless tobacco  He reports that he drinks alcohol  He reports that he does not use drugs  ,  has No Known Allergies         Review of Systems   Constitutional: Negative for appetite change, chills, fever and unexpected weight change  HENT: Negative for congestion, dental problem, ear pain, hearing loss, postnasal drip, rhinorrhea, sinus pain, sinus pressure, sneezing, sore throat, tinnitus and voice change  Eyes: Negative for visual disturbance  Respiratory: Negative for apnea, cough, chest tightness and shortness of breath  Cardiovascular: Negative for chest pain, palpitations and leg swelling  Gastrointestinal: Negative for abdominal pain, blood in stool, constipation, diarrhea, nausea and vomiting  Endocrine: Negative for cold intolerance, heat intolerance, polydipsia, polyphagia and polyuria  Genitourinary: Negative for decreased urine volume, difficulty urinating, dysuria, frequency and hematuria  Musculoskeletal: Negative for arthralgias, back pain, gait problem, joint swelling and myalgias  Skin: Negative for color change, rash and wound  Allergic/Immunologic: Negative for environmental allergies and food allergies  Neurological: Negative for dizziness, syncope, weakness, light-headedness, numbness and headaches  Hematological: Negative for adenopathy  Does not bruise/bleed easily  Psychiatric/Behavioral: Negative for sleep disturbance and suicidal ideas  The patient is not nervous/anxious  Objective:  Vitals:    11/07/18 1026   BP: 130/88   Pulse:    Resp:    Temp:    SpO2:       Physical Exam   Constitutional: He is oriented to person, place, and time  He appears well-developed and well-nourished  HENT:   Head: Normocephalic and atraumatic  Eyes: EOM are normal    Neck: Normal range of motion  Neck supple  Cardiovascular: Normal rate, regular rhythm and normal heart sounds  Pulmonary/Chest: Effort normal and breath sounds normal    Musculoskeletal: Normal range of motion  Neurological: He is alert and oriented to person, place, and time  Skin: Skin is warm and dry  Psychiatric: He has a normal mood and affect   His behavior is normal  Judgment and thought content normal

## 2018-11-08 DIAGNOSIS — F41.9 ANXIETY: Primary | ICD-10-CM

## 2018-11-15 ENCOUNTER — OFFICE VISIT (OUTPATIENT)
Dept: FAMILY MEDICINE CLINIC | Facility: CLINIC | Age: 38
End: 2018-11-15
Payer: COMMERCIAL

## 2018-11-15 VITALS
SYSTOLIC BLOOD PRESSURE: 156 MMHG | RESPIRATION RATE: 16 BRPM | HEART RATE: 84 BPM | HEIGHT: 68 IN | OXYGEN SATURATION: 98 % | BODY MASS INDEX: 32.28 KG/M2 | TEMPERATURE: 97.9 F | WEIGHT: 213 LBS | DIASTOLIC BLOOD PRESSURE: 90 MMHG

## 2018-11-15 DIAGNOSIS — M62.838 MUSCLE SPASM: Primary | ICD-10-CM

## 2018-11-15 PROCEDURE — 1036F TOBACCO NON-USER: CPT | Performed by: FAMILY MEDICINE

## 2018-11-15 PROCEDURE — 99213 OFFICE O/P EST LOW 20 MIN: CPT | Performed by: FAMILY MEDICINE

## 2018-11-15 PROCEDURE — 3008F BODY MASS INDEX DOCD: CPT | Performed by: FAMILY MEDICINE

## 2018-11-15 RX ORDER — CYCLOBENZAPRINE HCL 10 MG
10 TABLET ORAL 3 TIMES DAILY PRN
Qty: 30 TABLET | Refills: 0 | Status: SHIPPED | OUTPATIENT
Start: 2018-11-15 | End: 2019-01-30

## 2018-11-15 NOTE — PROGRESS NOTES
Assessment/Plan:         Diagnoses and all orders for this visit:    Muscle spasm  -     cyclobenzaprine (FLEXERIL) 10 mg tablet; Take 1 tablet (10 mg total) by mouth 3 (three) times a day as needed for muscle spasms        Discuss concerns recommended gentle ice massage antispasmodic/NSAIDs as needed, call for any changes worsening      Subjective:      Patient ID: Bello Curry is a 43 y o  male  Here for neck pain  Was thought to be related to the Zoloft, not sure  Started going to the gym boxing, has never done that particular exercise at all, really wiped me out shoulders arms, arms and shoulders were fine but neck is still very tight tense, or muscles feel tight  Denies any chest pain palpitations shortness of breath difficulty breathing negative numbness tingling  More nervous about starting a new medicine  Getting ready to go on vacation hopefully to straighten things out with wife, derek, so just want to check with you before left  Did start the Zoloft 1st few days slight stomach upset which has since resolved        The following portions of the patient's history were reviewed and updated as appropriate:   He has a past medical history of Atypical chest pain and Hyperlipidemia ,   does not have any pertinent problems on file  ,   has no past surgical history on file  ,  family history includes Diabetes in his father; Heart attack in his brother; Heart disease in his father; Hypertension in his mother  ,   reports that he has quit smoking  He has never used smokeless tobacco  He reports that he drinks alcohol  He reports that he does not use drugs  ,  has No Known Allergies         Review of Systems   Constitutional: Negative for appetite change, chills, fever and unexpected weight change  HENT: Negative for congestion, dental problem, ear pain, hearing loss, postnasal drip, rhinorrhea, sinus pain, sinus pressure, sneezing, sore throat, tinnitus and voice change  Eyes: Negative for visual disturbance  Respiratory: Negative for apnea, cough, chest tightness and shortness of breath  Cardiovascular: Negative for chest pain, palpitations and leg swelling  Gastrointestinal: Negative for abdominal pain, blood in stool, constipation, diarrhea, nausea and vomiting  Endocrine: Negative for cold intolerance, heat intolerance, polydipsia, polyphagia and polyuria  Genitourinary: Negative for decreased urine volume, difficulty urinating, dysuria, frequency and hematuria  Musculoskeletal: Positive for neck pain  Negative for arthralgias, back pain, gait problem, joint swelling and myalgias  Skin: Negative for color change, rash and wound  Allergic/Immunologic: Negative for environmental allergies and food allergies  Neurological: Negative for dizziness, syncope, weakness, light-headedness, numbness and headaches  Hematological: Negative for adenopathy  Does not bruise/bleed easily  Psychiatric/Behavioral: Negative for sleep disturbance and suicidal ideas  The patient is not nervous/anxious  Objective:  Vitals:    11/15/18 1246   BP: 156/90   Pulse: 84   Resp: 16   Temp: 97 9 °F (36 6 °C)   SpO2: 98%      Physical Exam   Constitutional: He is oriented to person, place, and time  He appears well-developed and well-nourished  HENT:   Head: Normocephalic and atraumatic  Eyes: EOM are normal    Neck: Normal range of motion  Cardiovascular: Normal rate, regular rhythm and normal heart sounds  Pulmonary/Chest: Effort normal    Musculoskeletal: Normal range of motion  He exhibits tenderness  Cervical back: He exhibits tenderness and spasm  Right/ left paraspinals tense/spasm   Neurological: He is alert and oriented to person, place, and time  Skin: Skin is warm and dry  Psychiatric: He has a normal mood and affect   His behavior is normal  Judgment and thought content normal

## 2019-01-06 DIAGNOSIS — F41.9 ANXIETY: ICD-10-CM

## 2019-01-30 ENCOUNTER — OFFICE VISIT (OUTPATIENT)
Dept: FAMILY MEDICINE CLINIC | Facility: CLINIC | Age: 39
End: 2019-01-30
Payer: COMMERCIAL

## 2019-01-30 VITALS
SYSTOLIC BLOOD PRESSURE: 148 MMHG | HEART RATE: 84 BPM | HEIGHT: 68 IN | OXYGEN SATURATION: 94 % | RESPIRATION RATE: 18 BRPM | BODY MASS INDEX: 33.04 KG/M2 | TEMPERATURE: 98.6 F | DIASTOLIC BLOOD PRESSURE: 80 MMHG | WEIGHT: 218 LBS

## 2019-01-30 DIAGNOSIS — F32.A DEPRESSION, UNSPECIFIED DEPRESSION TYPE: Primary | ICD-10-CM

## 2019-01-30 PROCEDURE — 3008F BODY MASS INDEX DOCD: CPT | Performed by: FAMILY MEDICINE

## 2019-01-30 PROCEDURE — 1036F TOBACCO NON-USER: CPT | Performed by: FAMILY MEDICINE

## 2019-01-30 PROCEDURE — 99213 OFFICE O/P EST LOW 20 MIN: CPT | Performed by: FAMILY MEDICINE

## 2019-01-30 RX ORDER — ESCITALOPRAM OXALATE 10 MG/1
10 TABLET ORAL DAILY
Qty: 30 TABLET | Refills: 3 | Status: SHIPPED | OUTPATIENT
Start: 2019-01-30 | End: 2019-06-04 | Stop reason: SDUPTHER

## 2019-01-30 RX ORDER — ALPRAZOLAM 0.25 MG/1
TABLET ORAL
COMMUNITY
Start: 2019-01-04 | End: 2019-01-30 | Stop reason: ALTCHOICE

## 2019-01-30 RX ORDER — ESCITALOPRAM OXALATE 10 MG/1
TABLET ORAL
COMMUNITY
Start: 2019-01-04 | End: 2019-01-30 | Stop reason: SDUPTHER

## 2019-01-30 RX ORDER — LORAZEPAM 0.5 MG/1
0.5 TABLET ORAL
Qty: 20 TABLET | Refills: 0 | Status: SHIPPED | OUTPATIENT
Start: 2019-01-30 | End: 2019-03-02 | Stop reason: SDUPTHER

## 2019-01-30 NOTE — PROGRESS NOTES
Assessment/Plan:         Diagnoses and all orders for this visit:    Depression, unspecified depression type  -     LORazepam (ATIVAN) 0 5 mg tablet; Take 1 tablet (0 5 mg total) by mouth daily at bedtime  -     escitalopram (LEXAPRO) 10 mg tablet; Take 1 tablet (10 mg total) by mouth daily    Other orders  -     Discontinue: ALPRAZolam (XANAX) 0 25 mg tablet; TAKE 1 TABLET BY MOUTH EVERY DAY  -     Discontinue: escitalopram (LEXAPRO) 10 mg tablet; TAKE 1 TABLET BY MOUTH EVERY DAY      discussed plan of care with patient, has scheduled appointment with therapist, to continue Lexapro 10 mg or change Xanax to lorazepam discussed with patient rationale, to maintain previously scheduled follow-up, to call for any issues concerns,        Subjective:      Patient ID: Juliana Lorenzo is a 43 y o  male  [facility] to [facility]   Was going for depression   Did not like the office and will not be going back   Has been taking the lexapro and xanax   Has appt with aden low this thursday    trying to figure things out , wife is bipolar manic, she has been cycling thru , now down , but there are other things going on , stressors and loss in family     I do have a follow-up appointment with you coming up along with some blood work  Currently denies any suicidal homicidal ideation plan, understands multiple stressed the house especially concerning wife's health, currently dealing with issues        The following portions of the patient's history were reviewed and updated as appropriate:   He has a past medical history of Atypical chest pain and Hyperlipidemia ,   does not have any pertinent problems on file  ,   has no past surgical history on file  ,  family history includes Diabetes in his father; Heart attack in his brother; Heart disease in his father; Hypertension in his mother  ,   reports that he has quit smoking  He has never used smokeless tobacco  He reports that he drinks alcohol   He reports that he does not use drugs  ,  has No Known Allergies         Review of Systems   Constitutional: Negative for appetite change, chills, fever and unexpected weight change  HENT: Negative for congestion, dental problem, ear pain, hearing loss, postnasal drip, rhinorrhea, sinus pain, sinus pressure, sneezing, sore throat, tinnitus and voice change  Eyes: Negative for visual disturbance  Respiratory: Negative for apnea, cough, chest tightness and shortness of breath  Cardiovascular: Negative for chest pain, palpitations and leg swelling  Gastrointestinal: Negative for abdominal pain, blood in stool, constipation, diarrhea, nausea and vomiting  Endocrine: Negative for cold intolerance, heat intolerance, polydipsia, polyphagia and polyuria  Genitourinary: Negative for decreased urine volume, difficulty urinating, dysuria, frequency and hematuria  Musculoskeletal: Negative for arthralgias, back pain, gait problem, joint swelling and myalgias  Skin: Negative for color change, rash and wound  Allergic/Immunologic: Negative for environmental allergies and food allergies  Neurological: Negative for dizziness, syncope, weakness, light-headedness, numbness and headaches  Hematological: Negative for adenopathy  Does not bruise/bleed easily  Psychiatric/Behavioral: Positive for dysphoric mood and sleep disturbance  Negative for self-injury and suicidal ideas  The patient is nervous/anxious  Objective:  Vitals:    01/30/19 1506   BP: 148/80   Pulse: 84   Resp: 18   Temp: 98 6 °F (37 °C)   SpO2: 94%      Physical Exam   Constitutional: He is oriented to person, place, and time  He appears well-developed and well-nourished  No distress  HENT:   Head: Normocephalic and atraumatic  Right Ear: External ear normal    Left Ear: External ear normal    Eyes: Conjunctivae and EOM are normal  No scleral icterus  Neck: Normal range of motion  Neck supple  Cardiovascular: Normal rate      Pulmonary/Chest: Effort normal    Neurological: He is oriented to person, place, and time  Psychiatric: His speech is normal and behavior is normal  Judgment and thought content normal  Cognition and memory are normal  Cognition and memory are not impaired  He does not express impulsivity  He exhibits a depressed mood  He expresses no homicidal and no suicidal ideation  He expresses no suicidal plans and no homicidal plans  He is attentive

## 2019-03-02 DIAGNOSIS — I10 ESSENTIAL HYPERTENSION: ICD-10-CM

## 2019-03-02 DIAGNOSIS — F32.A DEPRESSION, UNSPECIFIED DEPRESSION TYPE: ICD-10-CM

## 2019-03-04 RX ORDER — LORAZEPAM 0.5 MG/1
TABLET ORAL
Qty: 20 TABLET | Refills: 0 | Status: SHIPPED | OUTPATIENT
Start: 2019-03-04 | End: 2019-08-14 | Stop reason: CLARIF

## 2019-03-04 RX ORDER — LISINOPRIL 10 MG/1
TABLET ORAL
Qty: 30 TABLET | Refills: 3 | Status: SHIPPED | OUTPATIENT
Start: 2019-03-04 | End: 2019-07-05 | Stop reason: SDUPTHER

## 2019-05-02 ENCOUNTER — OFFICE VISIT (OUTPATIENT)
Dept: CARDIOLOGY CLINIC | Facility: CLINIC | Age: 39
End: 2019-05-02
Payer: COMMERCIAL

## 2019-05-02 VITALS
BODY MASS INDEX: 33.19 KG/M2 | OXYGEN SATURATION: 96 % | HEIGHT: 68 IN | DIASTOLIC BLOOD PRESSURE: 82 MMHG | WEIGHT: 219 LBS | HEART RATE: 89 BPM | SYSTOLIC BLOOD PRESSURE: 128 MMHG

## 2019-05-02 DIAGNOSIS — E78.2 MIXED HYPERLIPIDEMIA: ICD-10-CM

## 2019-05-02 DIAGNOSIS — I10 ESSENTIAL HYPERTENSION: Primary | ICD-10-CM

## 2019-05-02 DIAGNOSIS — Z82.49 FAMILY HISTORY OF PREMATURE CAD: ICD-10-CM

## 2019-05-02 PROCEDURE — 99213 OFFICE O/P EST LOW 20 MIN: CPT | Performed by: INTERNAL MEDICINE

## 2019-05-06 ENCOUNTER — APPOINTMENT (OUTPATIENT)
Dept: LAB | Facility: CLINIC | Age: 39
End: 2019-05-06
Payer: COMMERCIAL

## 2019-05-06 DIAGNOSIS — I10 ESSENTIAL HYPERTENSION: ICD-10-CM

## 2019-05-06 DIAGNOSIS — E78.2 MIXED HYPERLIPIDEMIA: ICD-10-CM

## 2019-05-06 DIAGNOSIS — Z12.5 ENCOUNTER FOR PROSTATE CANCER SCREENING: ICD-10-CM

## 2019-05-06 LAB
ALBUMIN SERPL BCP-MCNC: 4.2 G/DL (ref 3.5–5)
ALP SERPL-CCNC: 96 U/L (ref 46–116)
ALT SERPL W P-5'-P-CCNC: 63 U/L (ref 12–78)
ANION GAP SERPL CALCULATED.3IONS-SCNC: 5 MMOL/L (ref 4–13)
AST SERPL W P-5'-P-CCNC: 32 U/L (ref 5–45)
BILIRUB SERPL-MCNC: 0.58 MG/DL (ref 0.2–1)
BILIRUB UR QL STRIP: NEGATIVE
BUN SERPL-MCNC: 12 MG/DL (ref 5–25)
CALCIUM SERPL-MCNC: 8.6 MG/DL (ref 8.3–10.1)
CHLORIDE SERPL-SCNC: 105 MMOL/L (ref 100–108)
CHOLEST SERPL-MCNC: 181 MG/DL (ref 50–200)
CLARITY UR: CLEAR
CO2 SERPL-SCNC: 29 MMOL/L (ref 21–32)
COLOR UR: YELLOW
CREAT SERPL-MCNC: 0.8 MG/DL (ref 0.6–1.3)
CREAT UR-MCNC: 179 MG/DL
GFR SERPL CREATININE-BSD FRML MDRD: 112 ML/MIN/1.73SQ M
GLUCOSE P FAST SERPL-MCNC: 91 MG/DL (ref 65–99)
GLUCOSE UR STRIP-MCNC: NEGATIVE MG/DL
HDLC SERPL-MCNC: 46 MG/DL (ref 40–60)
HGB UR QL STRIP.AUTO: NEGATIVE
KETONES UR STRIP-MCNC: NEGATIVE MG/DL
LDLC SERPL CALC-MCNC: 113 MG/DL (ref 0–100)
LEUKOCYTE ESTERASE UR QL STRIP: NEGATIVE
MICROALBUMIN UR-MCNC: 6.8 MG/L (ref 0–20)
MICROALBUMIN/CREAT 24H UR: 4 MG/G CREATININE (ref 0–30)
NITRITE UR QL STRIP: NEGATIVE
NONHDLC SERPL-MCNC: 135 MG/DL
PH UR STRIP.AUTO: 6 [PH]
POTASSIUM SERPL-SCNC: 3.7 MMOL/L (ref 3.5–5.3)
PROT SERPL-MCNC: 8.3 G/DL (ref 6.4–8.2)
PROT UR STRIP-MCNC: NEGATIVE MG/DL
PSA SERPL-MCNC: 0.9 NG/ML (ref 0–4)
SODIUM SERPL-SCNC: 139 MMOL/L (ref 136–145)
SP GR UR STRIP.AUTO: 1.02 (ref 1–1.03)
TRIGL SERPL-MCNC: 108 MG/DL
UROBILINOGEN UR QL STRIP.AUTO: 0.2 E.U./DL

## 2019-05-06 PROCEDURE — 80053 COMPREHEN METABOLIC PANEL: CPT

## 2019-05-06 PROCEDURE — 82043 UR ALBUMIN QUANTITATIVE: CPT | Performed by: FAMILY MEDICINE

## 2019-05-06 PROCEDURE — 82570 ASSAY OF URINE CREATININE: CPT | Performed by: FAMILY MEDICINE

## 2019-05-06 PROCEDURE — G0103 PSA SCREENING: HCPCS

## 2019-05-06 PROCEDURE — 36415 COLL VENOUS BLD VENIPUNCTURE: CPT

## 2019-05-06 PROCEDURE — 81003 URINALYSIS AUTO W/O SCOPE: CPT

## 2019-05-06 PROCEDURE — 80061 LIPID PANEL: CPT

## 2019-05-07 ENCOUNTER — OFFICE VISIT (OUTPATIENT)
Dept: FAMILY MEDICINE CLINIC | Facility: CLINIC | Age: 39
End: 2019-05-07
Payer: COMMERCIAL

## 2019-05-07 VITALS
DIASTOLIC BLOOD PRESSURE: 72 MMHG | SYSTOLIC BLOOD PRESSURE: 128 MMHG | BODY MASS INDEX: 32.58 KG/M2 | HEART RATE: 84 BPM | OXYGEN SATURATION: 98 % | WEIGHT: 215 LBS | HEIGHT: 68 IN

## 2019-05-07 DIAGNOSIS — I10 ESSENTIAL HYPERTENSION: ICD-10-CM

## 2019-05-07 DIAGNOSIS — F32.A DEPRESSION, UNSPECIFIED DEPRESSION TYPE: ICD-10-CM

## 2019-05-07 DIAGNOSIS — G47.00 INSOMNIA, UNSPECIFIED TYPE: Primary | ICD-10-CM

## 2019-05-07 DIAGNOSIS — E78.2 MIXED HYPERLIPIDEMIA: ICD-10-CM

## 2019-05-07 PROCEDURE — 99214 OFFICE O/P EST MOD 30 MIN: CPT | Performed by: FAMILY MEDICINE

## 2019-05-07 RX ORDER — TRAZODONE HYDROCHLORIDE 50 MG/1
50 TABLET ORAL
Qty: 30 TABLET | Refills: 3 | Status: SHIPPED | OUTPATIENT
Start: 2019-05-07 | End: 2019-08-14 | Stop reason: ALTCHOICE

## 2019-05-07 RX ORDER — ROSUVASTATIN CALCIUM 10 MG/1
10 TABLET, COATED ORAL DAILY
Qty: 30 TABLET | Refills: 3 | Status: SHIPPED | OUTPATIENT
Start: 2019-05-07 | End: 2019-08-14 | Stop reason: SDUPTHER

## 2019-05-31 DIAGNOSIS — I10 ESSENTIAL HYPERTENSION: ICD-10-CM

## 2019-05-31 DIAGNOSIS — F32.A DEPRESSION, UNSPECIFIED DEPRESSION TYPE: ICD-10-CM

## 2019-05-31 DIAGNOSIS — E78.2 MIXED HYPERLIPIDEMIA: ICD-10-CM

## 2019-05-31 DIAGNOSIS — R07.89 ATYPICAL CHEST PAIN: ICD-10-CM

## 2019-06-03 RX ORDER — ASPIRIN 81 MG/1
TABLET ORAL
Qty: 90 TABLET | Refills: 3 | Status: SHIPPED | OUTPATIENT
Start: 2019-06-03 | End: 2020-06-30 | Stop reason: SDUPTHER

## 2019-06-04 ENCOUNTER — OFFICE VISIT (OUTPATIENT)
Dept: FAMILY MEDICINE CLINIC | Facility: CLINIC | Age: 39
End: 2019-06-04
Payer: COMMERCIAL

## 2019-06-04 VITALS
RESPIRATION RATE: 18 BRPM | TEMPERATURE: 97.8 F | WEIGHT: 219 LBS | DIASTOLIC BLOOD PRESSURE: 90 MMHG | HEIGHT: 68 IN | SYSTOLIC BLOOD PRESSURE: 136 MMHG | BODY MASS INDEX: 33.19 KG/M2 | OXYGEN SATURATION: 98 % | HEART RATE: 108 BPM

## 2019-06-04 DIAGNOSIS — F32.A DEPRESSION, UNSPECIFIED DEPRESSION TYPE: ICD-10-CM

## 2019-06-04 DIAGNOSIS — I10 ESSENTIAL HYPERTENSION: Primary | ICD-10-CM

## 2019-06-04 DIAGNOSIS — E78.2 MIXED HYPERLIPIDEMIA: ICD-10-CM

## 2019-06-04 PROCEDURE — 1036F TOBACCO NON-USER: CPT | Performed by: FAMILY MEDICINE

## 2019-06-04 PROCEDURE — 3008F BODY MASS INDEX DOCD: CPT | Performed by: FAMILY MEDICINE

## 2019-06-04 PROCEDURE — 99214 OFFICE O/P EST MOD 30 MIN: CPT | Performed by: FAMILY MEDICINE

## 2019-06-04 RX ORDER — ESCITALOPRAM OXALATE 10 MG/1
TABLET ORAL
Qty: 30 TABLET | Refills: 3 | Status: SHIPPED | OUTPATIENT
Start: 2019-06-04 | End: 2019-09-04 | Stop reason: SDUPTHER

## 2019-06-04 RX ORDER — ESCITALOPRAM OXALATE 10 MG/1
10 TABLET ORAL DAILY
Qty: 30 TABLET | Refills: 3 | Status: SHIPPED | OUTPATIENT
Start: 2019-06-04 | End: 2019-08-14 | Stop reason: SDUPTHER

## 2019-06-30 DIAGNOSIS — I10 ESSENTIAL HYPERTENSION: ICD-10-CM

## 2019-07-04 ENCOUNTER — TELEPHONE (OUTPATIENT)
Dept: OTHER | Facility: OTHER | Age: 39
End: 2019-07-04

## 2019-07-04 NOTE — TELEPHONE ENCOUNTER
Patient called for a refill of Lisinopril  I called [facility] pharmacy at [phone] to verify that patient doesn't have any refills remaining  I received a recorded message that the pharmacy closed at 1400  I called patient and relayed that the pharmacy is closed a refill request was already entered into the chart by Jose Griffin

## 2019-07-05 DIAGNOSIS — I10 ESSENTIAL HYPERTENSION: ICD-10-CM

## 2019-07-05 RX ORDER — LISINOPRIL 10 MG/1
10 TABLET ORAL DAILY
Qty: 30 TABLET | Refills: 3 | Status: SHIPPED | OUTPATIENT
Start: 2019-07-05 | End: 2019-08-14 | Stop reason: SDUPTHER

## 2019-07-11 RX ORDER — LISINOPRIL 10 MG/1
TABLET ORAL
Qty: 30 TABLET | Refills: 3 | Status: SHIPPED | OUTPATIENT
Start: 2019-07-11 | End: 2019-08-14 | Stop reason: SDUPTHER

## 2019-07-12 RX ORDER — LISINOPRIL 10 MG/1
10 TABLET ORAL DAILY
Qty: 30 TABLET | Refills: 5 | Status: SHIPPED | OUTPATIENT
Start: 2019-07-12 | End: 2019-08-14 | Stop reason: SDUPTHER

## 2019-08-14 ENCOUNTER — OFFICE VISIT (OUTPATIENT)
Dept: FAMILY MEDICINE CLINIC | Facility: CLINIC | Age: 39
End: 2019-08-14
Payer: COMMERCIAL

## 2019-08-14 VITALS
OXYGEN SATURATION: 97 % | SYSTOLIC BLOOD PRESSURE: 144 MMHG | WEIGHT: 219.4 LBS | DIASTOLIC BLOOD PRESSURE: 100 MMHG | HEART RATE: 89 BPM | TEMPERATURE: 97.9 F | HEIGHT: 68 IN | BODY MASS INDEX: 33.25 KG/M2

## 2019-08-14 DIAGNOSIS — G47.00 INSOMNIA, UNSPECIFIED TYPE: Primary | ICD-10-CM

## 2019-08-14 DIAGNOSIS — I10 ESSENTIAL HYPERTENSION: ICD-10-CM

## 2019-08-14 PROCEDURE — 3008F BODY MASS INDEX DOCD: CPT | Performed by: FAMILY MEDICINE

## 2019-08-14 PROCEDURE — 99214 OFFICE O/P EST MOD 30 MIN: CPT | Performed by: FAMILY MEDICINE

## 2019-08-14 RX ORDER — LISINOPRIL 10 MG/1
10 TABLET ORAL DAILY
Qty: 30 TABLET | Refills: 5 | Status: SHIPPED | OUTPATIENT
Start: 2019-08-14 | End: 2020-05-10 | Stop reason: SDUPTHER

## 2019-08-14 RX ORDER — ZOLPIDEM TARTRATE 5 MG/1
5 TABLET ORAL
Qty: 14 TABLET | Refills: 0 | Status: SHIPPED | OUTPATIENT
Start: 2019-08-14 | End: 2019-09-07 | Stop reason: SDUPTHER

## 2019-08-14 RX ORDER — ROSUVASTATIN CALCIUM 10 MG/1
10 TABLET, COATED ORAL DAILY
Qty: 30 TABLET | Refills: 5 | Status: SHIPPED | OUTPATIENT
Start: 2019-08-14 | End: 2020-08-17 | Stop reason: ALTCHOICE

## 2019-08-14 NOTE — PROGRESS NOTES
Assessment/Plan:       Diagnoses and all orders for this visit:    Insomnia, unspecified type  -     zolpidem (AMBIEN) 5 mg tablet; Take 1 tablet (5 mg total) by mouth daily at bedtime as needed for sleep    Essential hypertension  -     rosuvastatin (CRESTOR) 10 MG tablet; Take 1 tablet (10 mg total) by mouth daily  -     lisinopril (ZESTRIL) 10 mg tablet; Take 1 tablet (10 mg total) by mouth daily        Insomnia  Discussed plan of care, reviewed patient's dietary intake of caffeine his excessive recommended modifications discussed limiting caffeine intake not only coffee but caffeinated products in general  Discussed sleep hygiene, medications indications side effects and warnings  htn  To continue current medications, once again stressed the importance of modifications diet    Subjective:      Patient ID: Beth Baig is a 35 y o  male  Having a real hard time sleeping lays down and just can't   Has not been exercising , diet has been poor   caffeeine  A lot especially when working at [employer] and that has been my primary care office over the past weeks  work project programming  Denies palpitations chest pain shortness of breath difficulty breathing  Blood pressures has not been checking blood pressures but admittedly diet has been off along with has above a lot of coffee  Denies any depressive symptoms anxiety symptoms continues with Lexapro, she feels that is adequate      The following portions of the patient's history were reviewed and updated as appropriate:   He has a past medical history of Atypical chest pain and Hyperlipidemia ,  does not have any pertinent problems on file  ,   has no past surgical history on file  ,  family history includes Diabetes in his father; Heart attack in his brother; Heart disease in his father; Hypertension in his mother  ,   reports that he has quit smoking  He has never used smokeless tobacco  He reports that he drinks alcohol   He reports that he does not use drugs ,  has No Known Allergies         Review of Systems      Objective:  Vitals:    08/14/19 1119   BP: 144/100   Pulse: 89   Temp: 97 9 °F (36 6 °C)   SpO2: 97%      Physical Exam   Constitutional: He is oriented to person, place, and time  He appears well-developed and well-nourished  HENT:   Head: Normocephalic and atraumatic  Eyes: EOM are normal    Neck: Normal range of motion  Neck supple  Cardiovascular: Normal rate and regular rhythm  Pulmonary/Chest: Effort normal    Musculoskeletal: Normal range of motion  Neurological: He is alert and oriented to person, place, and time  He has normal reflexes  Skin: Skin is warm and dry  Psychiatric: He has a normal mood and affect   His behavior is normal  Judgment and thought content normal

## 2019-09-01 DIAGNOSIS — G47.00 INSOMNIA, UNSPECIFIED TYPE: ICD-10-CM

## 2019-09-03 RX ORDER — TRAZODONE HYDROCHLORIDE 50 MG/1
TABLET ORAL
Qty: 30 TABLET | Refills: 3 | Status: SHIPPED | OUTPATIENT
Start: 2019-09-03 | End: 2019-09-04 | Stop reason: ALTCHOICE

## 2019-09-04 ENCOUNTER — OFFICE VISIT (OUTPATIENT)
Dept: FAMILY MEDICINE CLINIC | Facility: CLINIC | Age: 39
End: 2019-09-04

## 2019-09-04 VITALS
WEIGHT: 229 LBS | DIASTOLIC BLOOD PRESSURE: 68 MMHG | SYSTOLIC BLOOD PRESSURE: 128 MMHG | HEART RATE: 103 BPM | OXYGEN SATURATION: 98 % | HEIGHT: 68 IN | BODY MASS INDEX: 34.71 KG/M2 | TEMPERATURE: 98.3 F

## 2019-09-04 DIAGNOSIS — F32.A DEPRESSION, UNSPECIFIED DEPRESSION TYPE: ICD-10-CM

## 2019-09-04 DIAGNOSIS — G47.00 INSOMNIA, UNSPECIFIED TYPE: Primary | ICD-10-CM

## 2019-09-04 PROCEDURE — 99214 OFFICE O/P EST MOD 30 MIN: CPT | Performed by: FAMILY MEDICINE

## 2019-09-04 RX ORDER — ESCITALOPRAM OXALATE 20 MG/1
10 TABLET ORAL DAILY
Qty: 30 TABLET | Refills: 3 | Status: SHIPPED | OUTPATIENT
Start: 2019-09-04 | End: 2019-09-18 | Stop reason: SDUPTHER

## 2019-09-04 NOTE — PROGRESS NOTES
Assessment/Plan:         Diagnoses and all orders for this visit:    Insomnia, unspecified type    Depression, unspecified depression type  -     escitalopram (LEXAPRO) 20 mg tablet; Take 0 5 tablets (10 mg total) by mouth daily        Insomnia  Stable, discussed sleep hygiene at length, discussed biphasic sleep cycles stages of sleep,  Discussed medications indications precautions and cautions  Depression/anxiety  Discussed plan care will increase Lexapro 20 mg p  O  Daily patient to call approximately 3 weeks with results         Subjective:      Patient ID: Lucia Quarles is a 35 y o  male  Here to follow up on sleep issues also did discuss anxiety  Lexapro has been mildly effective controlling those anxious moments feels could be better lot of things going on with work developments, self-employed , has projects on the table  Has improved from initial start, counseling/therapy  Family doing well  Negative alcohol  Limited caffeine  Has joined a gym actually just came from 1, working on balance  Sleep  Has been taking Ambien finds it very effective, tries to not take it too frequently,  Denies any side effect issues  Wakes up feeling refreshed      The following portions of the patient's history were reviewed and updated as appropriate:   He has a past medical history of Atypical chest pain and Hyperlipidemia ,  does not have any pertinent problems on file  ,   has no past surgical history on file  ,  family history includes Diabetes in his father; Heart attack in his brother; Heart disease in his father; Hypertension in his mother  ,   reports that he has quit smoking  He has never used smokeless tobacco  He reports that he drinks alcohol  He reports that he does not use drugs  ,  has No Known Allergies         Review of Systems   Psychiatric/Behavioral: Positive for sleep disturbance  The patient is nervous/anxious  All other systems reviewed and are negative          Objective:  Vitals:    09/04/19 1113 BP: 128/68   Pulse:    Temp:    SpO2:       Physical Exam   Constitutional: He appears well-developed and well-nourished  Neck: Normal range of motion  Cardiovascular: Normal rate  Pulmonary/Chest: Effort normal    Skin: Skin is warm and dry

## 2019-09-07 DIAGNOSIS — G47.00 INSOMNIA, UNSPECIFIED TYPE: ICD-10-CM

## 2019-09-09 RX ORDER — ZOLPIDEM TARTRATE 5 MG/1
TABLET ORAL
Qty: 14 TABLET | Refills: 0 | Status: SHIPPED | OUTPATIENT
Start: 2019-09-09 | End: 2020-02-27

## 2019-09-18 DIAGNOSIS — F32.A DEPRESSION, UNSPECIFIED DEPRESSION TYPE: ICD-10-CM

## 2019-09-18 RX ORDER — ESCITALOPRAM OXALATE 20 MG/1
10 TABLET ORAL DAILY
Qty: 30 TABLET | Refills: 3 | Status: SHIPPED | OUTPATIENT
Start: 2019-09-18 | End: 2019-09-24

## 2019-09-23 ENCOUNTER — TELEPHONE (OUTPATIENT)
Dept: FAMILY MEDICINE CLINIC | Facility: CLINIC | Age: 39
End: 2019-09-23

## 2019-09-23 DIAGNOSIS — F32.A DEPRESSION, UNSPECIFIED DEPRESSION TYPE: ICD-10-CM

## 2019-09-23 NOTE — TELEPHONE ENCOUNTER
You were suppose to increase the Lexapro to 20 mg's  You called in the 20 mg and put it as taking half of tablet  He needs a new rx called in as Escitalopram 20 mg  Take one tablet daily

## 2019-09-24 ENCOUNTER — OFFICE VISIT (OUTPATIENT)
Dept: FAMILY MEDICINE CLINIC | Facility: CLINIC | Age: 39
End: 2019-09-24
Payer: COMMERCIAL

## 2019-09-24 VITALS
HEART RATE: 85 BPM | BODY MASS INDEX: 34.56 KG/M2 | TEMPERATURE: 97.9 F | OXYGEN SATURATION: 98 % | SYSTOLIC BLOOD PRESSURE: 134 MMHG | DIASTOLIC BLOOD PRESSURE: 90 MMHG | WEIGHT: 228 LBS | HEIGHT: 68 IN

## 2019-09-24 DIAGNOSIS — N52.9 ERECTILE DYSFUNCTION, UNSPECIFIED ERECTILE DYSFUNCTION TYPE: ICD-10-CM

## 2019-09-24 DIAGNOSIS — F41.9 ANXIETY AND DEPRESSION: Primary | ICD-10-CM

## 2019-09-24 DIAGNOSIS — F32.A ANXIETY AND DEPRESSION: Primary | ICD-10-CM

## 2019-09-24 DIAGNOSIS — F32.A DEPRESSION, UNSPECIFIED DEPRESSION TYPE: ICD-10-CM

## 2019-09-24 PROCEDURE — 99214 OFFICE O/P EST MOD 30 MIN: CPT | Performed by: FAMILY MEDICINE

## 2019-09-24 RX ORDER — ESCITALOPRAM OXALATE 20 MG/1
20 TABLET ORAL DAILY
Qty: 30 TABLET | Refills: 3 | Status: SHIPPED | OUTPATIENT
Start: 2019-09-24 | End: 2019-11-06 | Stop reason: CLARIF

## 2019-09-24 NOTE — PROGRESS NOTES
Assessment/Plan:     Chronic Problems:  No problem-specific Assessment & Plan notes found for this encounter  Visit Diagnosis:  Diagnoses and all orders for this visit:    Anxiety and depression    Erectile dysfunction, unspecified erectile dysfunction type      Anxiety/depression  Stable, has been treated with Lexapro 4, discussed weaning protocol, patient currently care counselor with good relationship discussed decreasing Lexapro to 10 mg p o  Daily x 2 weeks to discontinue discussed potential side effects issues concerns, call for any questions  Erectile dysfunction  Discussed at length potential causative issues stressors and medications and or combination, at this point would eliminate 1 potential cause address at a later, patient agrees plan    Subjective:    Patient ID: Marcos Gray is a 35 y o  male      Medication is , feels having a problem with lexapro , having erectile dysfunction   Has been not had problem with obtaining and maintaining erections it is more with completion, this started with Lexapro but felt at the time dealing with the anxiety components of problem was more important  Currently under care of counselor aden pitts, has had conversation, would like to consider coming off the Lexapro, feels has plateaued issues at this point will need to resolve independent my actions, understanding stressors in environment dealing with in a more appropriate way  htn, has been monitoring blood pressures has had no similar complaints since being on lisinopril which is been far longer than Lexapro, blood pressures have been relatively normal without issue  Cholesterol unchanged continues negative muscle      The following portions of the patient's history were reviewed and updated as appropriate: allergies, current medications, past family history, past medical history, past social history, past surgical history and problem list     Review of Systems   Constitutional: Negative for appetite change, chills, fever and unexpected weight change  HENT: Negative for congestion, dental problem, ear pain, hearing loss, postnasal drip, rhinorrhea, sinus pressure, sinus pain, sneezing, sore throat, tinnitus and voice change  Eyes: Negative for visual disturbance  Respiratory: Negative for apnea, cough, chest tightness and shortness of breath  Cardiovascular: Negative for chest pain, palpitations and leg swelling  Gastrointestinal: Negative for abdominal pain, blood in stool, constipation, diarrhea, nausea and vomiting  Endocrine: Negative for cold intolerance, heat intolerance, polydipsia, polyphagia and polyuria  Genitourinary: Negative for decreased urine volume, difficulty urinating, dysuria, frequency and hematuria  Musculoskeletal: Negative for arthralgias, back pain, gait problem, joint swelling and myalgias  Skin: Negative for color change, rash and wound  Allergic/Immunologic: Negative for environmental allergies and food allergies  Neurological: Negative for dizziness, syncope, weakness, light-headedness, numbness and headaches  Hematological: Negative for adenopathy  Does not bruise/bleed easily  Psychiatric/Behavioral: Negative for sleep disturbance and suicidal ideas  The patient is not nervous/anxious            /90   Pulse 85   Temp 97 9 °F (36 6 °C)   Ht 5' 8" (1 727 m)   Wt 103 kg (228 lb)   SpO2 98%   BMI 34 67 kg/m²   Social History     Socioeconomic History    Marital status: Single     Spouse name: Not on file    Number of children: Not on file    Years of education: Not on file    Highest education level: Not on file   Occupational History    Occupation: employed   Social Needs    Financial resource strain: Not on file    Food insecurity:     Worry: Not on file     Inability: Not on file   Extole needs:     Medical: Not on file     Non-medical: Not on file   Tobacco Use    Smoking status: Former Smoker    Smokeless tobacco: Never Used Substance and Sexual Activity    Alcohol use: Yes     Comment: social    Drug use: No    Sexual activity: Not on file   Lifestyle    Physical activity:     Days per week: Not on file     Minutes per session: Not on file    Stress: Not on file   Relationships    Social connections:     Talks on phone: Not on file     Gets together: Not on file     Attends Sikh service: Not on file     Active member of club or organization: Not on file     Attends meetings of clubs or organizations: Not on file     Relationship status: Not on file    Intimate partner violence:     Fear of current or ex partner: Not on file     Emotionally abused: Not on file     Physically abused: Not on file     Forced sexual activity: Not on file   Other Topics Concern    Not on file   Social History Narrative    Always uses seat belt     Daily caffeinated coffee consumption    Lives with family     Past Medical History:   Diagnosis Date    Atypical chest pain     Hyperlipidemia     last assessed 1/17/17     Family History   Problem Relation Age of Onset    Hypertension Mother     Heart disease Father         cardiac d/o    Diabetes Father     Heart attack Brother      No past surgical history on file      Current Outpatient Medications:     aspirin (ECOTRIN LOW STRENGTH) 81 mg EC tablet, TAKE 1 TABLET BY MOUTH EVERY DAY, Disp: 90 tablet, Rfl: 3    escitalopram (LEXAPRO) 20 mg tablet, Take 1 tablet (20 mg total) by mouth daily, Disp: 30 tablet, Rfl: 3    lisinopril (ZESTRIL) 10 mg tablet, Take 1 tablet (10 mg total) by mouth daily, Disp: 30 tablet, Rfl: 5    Multiple Vitamin (MULTIVITAMIN) tablet, Take 1 tablet by mouth daily, Disp: , Rfl:     Omega-3 Fatty Acids (OMEGA 3 500 PO), Take by mouth daily, Disp: , Rfl:     rosuvastatin (CRESTOR) 10 MG tablet, Take 1 tablet (10 mg total) by mouth daily, Disp: 30 tablet, Rfl: 5    zolpidem (AMBIEN) 5 mg tablet, TAKE 1 TABLET BY MOUTH DAILY AT BEDTIME AS NEEDED FOR SLEEP, Disp: 14 tablet, Rfl: 0    No Known Allergies       Lab Review   not applicable     Imaging: No results found  Objective:     Physical Exam   Constitutional: He appears well-developed and well-nourished  No distress  HENT:   Head: Normocephalic and atraumatic  Neck: Normal range of motion  Cardiovascular: Normal rate  Pulmonary/Chest: Effort normal    Skin: Skin is warm and dry  Psychiatric: He has a normal mood and affect  His behavior is normal  Judgment and thought content normal          There are no Patient Instructions on file for this visit  PITA Manuel    Portions of the record may have been created with voice recognition software  Occasional wrong word or "sound a like" substitutions may have occurred due to the inherent limitations of voice recognition software  Read the chart carefully and recognize, using context, where substitutions have occurred

## 2019-11-04 ENCOUNTER — TELEPHONE (OUTPATIENT)
Dept: FAMILY MEDICINE CLINIC | Facility: CLINIC | Age: 39
End: 2019-11-04

## 2019-11-04 NOTE — TELEPHONE ENCOUNTER
Patient called  He now has [facility] [employer]( in put new info in)    His Crestor now needs prior Sarah Ortega

## 2019-11-06 ENCOUNTER — TELEPHONE (OUTPATIENT)
Dept: FAMILY MEDICINE CLINIC | Facility: CLINIC | Age: 39
End: 2019-11-06

## 2019-11-06 ENCOUNTER — OFFICE VISIT (OUTPATIENT)
Dept: FAMILY MEDICINE CLINIC | Facility: CLINIC | Age: 39
End: 2019-11-06
Payer: COMMERCIAL

## 2019-11-06 VITALS
OXYGEN SATURATION: 99 % | HEART RATE: 78 BPM | RESPIRATION RATE: 14 BRPM | TEMPERATURE: 97.9 F | WEIGHT: 225.6 LBS | DIASTOLIC BLOOD PRESSURE: 78 MMHG | BODY MASS INDEX: 34.19 KG/M2 | SYSTOLIC BLOOD PRESSURE: 128 MMHG | HEIGHT: 68 IN

## 2019-11-06 DIAGNOSIS — L30.2 ID REACTION: Primary | ICD-10-CM

## 2019-11-06 PROCEDURE — 3008F BODY MASS INDEX DOCD: CPT | Performed by: FAMILY MEDICINE

## 2019-11-06 PROCEDURE — 99213 OFFICE O/P EST LOW 20 MIN: CPT | Performed by: FAMILY MEDICINE

## 2019-11-06 RX ORDER — TRIAMCINOLONE ACETONIDE 1 MG/G
CREAM TOPICAL 2 TIMES DAILY
Qty: 30 G | Refills: 0 | Status: SHIPPED | OUTPATIENT
Start: 2019-11-06 | End: 2021-10-18 | Stop reason: SDUPTHER

## 2019-11-06 NOTE — PROGRESS NOTES
Depression Screening and Follow-up Plan: Clincally patient does not have depression  No treatment is required  Assessment/Plan:     Chronic Problems:  No problem-specific Assessment & Plan notes found for this encounter  Visit Diagnosis:  Diagnoses and all orders for this visit:    Id reaction  -     triamcinolone (KENALOG) 0 1 % cream; Apply topically 2 (two) times a day     Id reaction  Discussed multiple potential causative factors reviewed with patient environmental, applications, home cleaning products  Recommend avoidance therapy 1 item at a time, discussed the difficulty identifying specific products, journaling, call for any changes for resolved      Subjective:    Patient ID: Ada Martinez is a 35 y o  male  Rash/eruption right hand 1st finger lateral aspects not on the pad  Has been for the past 2-3 weeks  Irritated/itchy, with small bumps  Has not been spreading  No known changes in lotions creams detergents, has begun using new hair cream  Has been doing the dishes more often with abrasive pads does not wear gloves  Also works out at [employer]  Spends time on the computer  No other areas involved  Self treatment none        The following portions of the patient's history were reviewed and updated as appropriate: allergies, current medications, past family history, past medical history, past social history, past surgical history and problem list     Review of Systems   Skin: Positive for rash (Right hand 1st finger)           /78   Pulse 78   Temp 97 9 °F (36 6 °C)   Resp 14   Ht 5' 8" (1 727 m)   Wt 102 kg (225 lb 9 6 oz)   SpO2 99%   BMI 34 30 kg/m²   Social History     Socioeconomic History    Marital status: Single     Spouse name: Not on file    Number of children: Not on file    Years of education: Not on file    Highest education level: Not on file   Occupational History    Occupation: employed   Social Needs    Financial resource strain: Not on file   Pierce-Luis Manuel insecurity:     Worry: Not on file     Inability: Not on file    Transportation needs:     Medical: Not on file     Non-medical: Not on file   Tobacco Use    Smoking status: Former Smoker    Smokeless tobacco: Never Used   Substance and Sexual Activity    Alcohol use: Yes     Comment: social    Drug use: No    Sexual activity: Not on file   Lifestyle    Physical activity:     Days per week: Not on file     Minutes per session: Not on file    Stress: Not on file   Relationships    Social connections:     Talks on phone: Not on file     Gets together: Not on file     Attends Buddhism service: Not on file     Active member of club or organization: Not on file     Attends meetings of clubs or organizations: Not on file     Relationship status: Not on file    Intimate partner violence:     Fear of current or ex partner: Not on file     Emotionally abused: Not on file     Physically abused: Not on file     Forced sexual activity: Not on file   Other Topics Concern    Not on file   Social History Narrative    Always uses seat belt     Daily caffeinated coffee consumption    Lives with family     Past Medical History:   Diagnosis Date    Atypical chest pain     Hyperlipidemia     last assessed 1/17/17     Family History   Problem Relation Age of Onset    Hypertension Mother     Heart disease Father         cardiac d/o    Diabetes Father     Heart attack Brother      No past surgical history on file      Current Outpatient Medications:     aspirin (ECOTRIN LOW STRENGTH) 81 mg EC tablet, TAKE 1 TABLET BY MOUTH EVERY DAY, Disp: 90 tablet, Rfl: 3    lisinopril (ZESTRIL) 10 mg tablet, Take 1 tablet (10 mg total) by mouth daily, Disp: 30 tablet, Rfl: 5    Omega-3 Fatty Acids (OMEGA 3 500 PO), Take by mouth daily, Disp: , Rfl:     rosuvastatin (CRESTOR) 10 MG tablet, Take 1 tablet (10 mg total) by mouth daily, Disp: 30 tablet, Rfl: 5    Multiple Vitamin (MULTIVITAMIN) tablet, Take 1 tablet by mouth daily, Disp: , Rfl:     triamcinolone (KENALOG) 0 1 % cream, Apply topically 2 (two) times a day, Disp: 30 g, Rfl: 0    zolpidem (AMBIEN) 5 mg tablet, TAKE 1 TABLET BY MOUTH DAILY AT BEDTIME AS NEEDED FOR SLEEP (Patient not taking: Reported on 11/6/2019), Disp: 14 tablet, Rfl: 0    No Known Allergies       Lab Review   No visits with results within 6 Month(s) from this visit  Latest known visit with results is:   Appointment on 05/06/2019   Component Date Value    PSA 05/06/2019 0 9     Sodium 05/06/2019 139     Potassium 05/06/2019 3 7     Chloride 05/06/2019 105     CO2 05/06/2019 29     ANION GAP 05/06/2019 5     BUN 05/06/2019 12     Creatinine 05/06/2019 0 80     Glucose, Fasting 05/06/2019 91     Calcium 05/06/2019 8 6     AST 05/06/2019 32     ALT 05/06/2019 63     Alkaline Phosphatase 05/06/2019 96     Total Protein 05/06/2019 8 3*    Albumin 05/06/2019 4 2     Total Bilirubin 05/06/2019 0 58     eGFR 05/06/2019 112     Cholesterol 05/06/2019 181     Triglycerides 05/06/2019 108     HDL, Direct 05/06/2019 46     LDL Calculated 05/06/2019 113*    Non-HDL-Chol (CHOL-HDL) 05/06/2019 135     Color, UA 05/06/2019 Yellow     Clarity, UA 05/06/2019 Clear     Specific Gravity, UA 05/06/2019 1 021     pH, UA 05/06/2019 6 0     Leukocytes, UA 05/06/2019 Negative     Nitrite, UA 05/06/2019 Negative     Protein, UA 05/06/2019 Negative     Glucose, UA 05/06/2019 Negative     Ketones, UA 05/06/2019 Negative     Urobilinogen, UA 05/06/2019 0 2     Bilirubin, UA 05/06/2019 Negative     Blood, UA 05/06/2019 Negative         Imaging: No results found  Objective:     Physical Exam   Constitutional: He appears well-developed and well-nourished  HENT:   Head: Normocephalic and atraumatic  Eyes: Conjunctivae are normal    Neck: Normal range of motion  Cardiovascular: Normal rate and regular rhythm     Skin:   Right hand 1st finger lateral aspect  Mildly irritated well circumscribed area of irritation/vesicular eruption without scale         There are no Patient Instructions on file for this visit  PITA Hager    Portions of the record may have been created with voice recognition software  Occasional wrong word or "sound a like" substitutions may have occurred due to the inherent limitations of voice recognition software  Read the chart carefully and recognize, using context, where substitutions have occurred

## 2019-11-07 DIAGNOSIS — E78.2 MIXED HYPERLIPIDEMIA: Primary | ICD-10-CM

## 2019-11-07 RX ORDER — ATORVASTATIN CALCIUM 10 MG/1
10 TABLET, FILM COATED ORAL DAILY
Qty: 30 TABLET | Refills: 5 | Status: SHIPPED | OUTPATIENT
Start: 2019-11-07 | End: 2020-05-10 | Stop reason: SDUPTHER

## 2019-11-15 NOTE — TELEPHONE ENCOUNTER
Jose sent in atorvastatin 10mg   11/7/19  I called and informed the pt  He will check will [facility] and call the office back if he is still having trouble

## 2020-01-09 ENCOUNTER — OFFICE VISIT (OUTPATIENT)
Dept: DERMATOLOGY | Facility: CLINIC | Age: 40
End: 2020-01-09
Payer: COMMERCIAL

## 2020-01-09 DIAGNOSIS — Z12.83 SCREENING FOR SKIN CANCER: ICD-10-CM

## 2020-01-09 DIAGNOSIS — L85.8 KERATOSIS PILARIS: ICD-10-CM

## 2020-01-09 DIAGNOSIS — L30.1 CHRONIC VESICULAR HAND DERMATITIS: Primary | ICD-10-CM

## 2020-01-09 DIAGNOSIS — D22.9 NEVUS: ICD-10-CM

## 2020-01-09 PROCEDURE — 99203 OFFICE O/P NEW LOW 30 MIN: CPT | Performed by: DERMATOLOGY

## 2020-01-09 NOTE — PATIENT INSTRUCTIONS
Eczematous dermatitis of the hands advised that this may be real irritant related discussed use of moisturizing cream such as Neutrogena hand cream also suggested keeping hands out of water wearing gloves as much as possible and mild soap like Dove use of the topical steroid only when it is active  Keratosis pilaris advised that this is a inherited skin disorder that basically typically gets better with age usually better in the summer there is his is mild inflammation of the hair follicles no treatment is really helpful with this process  Nevi reviewed the concept of ABCDE and ugly duckling nothing markedly atypical patient reassured  Screening for Dermatologic Disorders: Nothing else of concern noted on complete exam follow up prn

## 2020-01-09 NOTE — PROGRESS NOTES
[facility]  [address]  [id number]  [phone]  [phone]     MRN: [id number] : 1980  Encounter: [id number]  Patient Information: Allison Rosas  Chief complaint:  Rash on right index finger and a new lesions noted on his back and upper arms for years    History of present illness:  76-year-old male presents secondary to intermittent rash on his right index finger that seems to come and go has been treated recently triamcinolone cream with good effect also brings pictures in that show some blisters in the area patient also with a long-term history of a rash that he does not like the appearance of on his back and upper arms and lastly his wife was concerned regarding mole on the back  Past Medical History:   Diagnosis Date    Atypical chest pain     Hyperlipidemia     last assessed 17     History reviewed  No pertinent surgical history  Social History   Social History     Substance and Sexual Activity   Alcohol Use Yes    Comment: social     Social History     Substance and Sexual Activity   Drug Use No     Social History     Tobacco Use   Smoking Status Former Smoker   Smokeless Tobacco Never Used     Family History   Problem Relation Age of Onset    Hypertension Mother     Heart disease Father         cardiac d/o    Diabetes Father     Heart attack Brother      Meds/Allergies   No Known Allergies    Meds:  Prior to Admission medications    Medication Sig Start Date End Date Taking?  Authorizing Provider   aspirin (ECOTRIN LOW STRENGTH) 81 mg EC tablet TAKE 1 TABLET BY MOUTH EVERY DAY 6/3/19  Yes Leone Cabot, MD   atorvastatin (LIPITOR) 10 mg tablet Take 1 tablet (10 mg total) by mouth daily 19  Yes PITA Pantoja   lisinopril (ZESTRIL) 10 mg tablet Take 1 tablet (10 mg total) by mouth daily 19  Yes PITA Pantoja   Multiple Vitamin (MULTIVITAMIN) tablet Take 1 tablet by mouth daily   Yes Historical Provider, MD   Omega-3 Fatty Acids (OMEGA 3 500 PO) Take by mouth daily   Yes Historical Provider, MD   triamcinolone (KENALOG) 0 1 % cream Apply topically 2 (two) times a day 11/6/19  Yes PITA Pantoja   rosuvastatin (CRESTOR) 10 MG tablet Take 1 tablet (10 mg total) by mouth daily  Patient not taking: Reported on 1/9/2020 8/14/19   PITA Pantoja   zolpidem (AMBIEN) 5 mg tablet TAKE 1 TABLET BY MOUTH DAILY AT BEDTIME AS NEEDED FOR SLEEP  Patient not taking: Reported on 11/6/2019 9/9/19   PITA Pantoja       Subjective:     Review of Systems:    General: negative for - chills, fatigue, fever,  weight gain or weight loss  Psychological: negative for - anxiety, behavioral disorder, concentration difficulties, decreased libido, depression, irritability, memory difficulties, mood swings, sleep disturbances or suicidal ideation  ENT: negative for - hearing difficulties , nasal congestion, nasal discharge, oral lesions, sinus pain, sneezing, sore throat  Allergy and Immunology: negative for - hives, insect bite sensitivity,  Hematological and Lymphatic: negative for - bleeding problems, blood clots,bruising, swollen lymph nodes  Endocrine: negative for - hair pattern changes, hot flashes, malaise/lethargy, mood swings, palpitations, polydipsia/polyuria, skin changes, temperature intolerance or unexpected weight change  Respiratory: negative for - cough, hemoptysis, orthopnea, shortness of breath, or wheezing  Cardiovascular: negative for - chest pain, dyspnea on exertion, edema,  Gastrointestinal: negative for - abdominal pain, nausea/vomiting  Genito-Urinary: negative for - dysuria, incontinence, irregular/heavy menses or urinary frequency/urgency  Musculoskeletal: negative for - gait disturbance, joint pain, joint stiffness, joint swelling, muscle pain, muscular weakness  Dermatological:  As in HPI  Neurological: negative for confusion, dizziness, headaches, impaired coordination/balance, memory loss, numbness/tingling, seizures, speech problems, tremors or weakness       Objective: There were no vitals taken for this visit  Physical Exam:    General Appearance:    Alert, cooperative, no distress   Head:    Normocephalic, without obvious abnormality, atraumatic           Skin:   A full skin exam was performed including scalp, head scalp, eyes, ears, nose, lips, neck, chest, axilla, abdomen, back, buttocks, bilateral upper extremities, bilateral lower extremities, hands, feet, fingers, toes, fingernails, and toenails no evidence of rash on the fingers at this time follicular erythematous papules noted widespread areas of the arms and upper back dome-shaped papule with regular shape and color nothing markedly atypical noted     Assessment:     1  Chronic vesicular hand dermatitis     2  Keratosis pilaris     3  Nevus     4   Screening for skin cancer           Plan:   Eczematous dermatitis of the hands advised that this may be real irritant related discussed use of moisturizing cream such as Neutrogena hand cream also suggested keeping hands out of water wearing gloves as much as possible and mild soap like Dove use of the topical steroid only when it is active  Keratosis pilaris advised that this is a inherited skin disorder that basically typically gets better with age usually better in the summer there is his is mild inflammation of the hair follicles no treatment is really helpful with this process  Nevi reviewed the concept of ABCDE and ugly duckling nothing markedly atypical patient reassured  Screening for Dermatologic Disorders: Nothing else of concern noted on complete exam follow up susie Cruz MD  1/9/2020,11:30 AM    Portions of the record may have been created with voice recognition software   Occasional wrong word or "sound a like" substitutions may have occurred due to the inherent limitations of voice recognition software   Read the chart carefully and recognize, using context, where substitutions have occurred

## 2020-02-27 DIAGNOSIS — G47.00 INSOMNIA, UNSPECIFIED TYPE: ICD-10-CM

## 2020-02-27 RX ORDER — ZOLPIDEM TARTRATE 5 MG/1
TABLET ORAL
Qty: 30 TABLET | Refills: 0 | Status: SHIPPED | OUTPATIENT
Start: 2020-02-27 | End: 2020-07-07 | Stop reason: SDUPTHER

## 2020-03-01 RX ORDER — ZOLPIDEM TARTRATE 5 MG/1
5 TABLET ORAL
Qty: 28 TABLET | Refills: 0 | Status: SHIPPED | OUTPATIENT
Start: 2020-03-01 | End: 2021-02-16 | Stop reason: SDUPTHER

## 2020-05-10 DIAGNOSIS — I10 ESSENTIAL HYPERTENSION: ICD-10-CM

## 2020-05-10 DIAGNOSIS — E78.2 MIXED HYPERLIPIDEMIA: ICD-10-CM

## 2020-05-11 DIAGNOSIS — E78.2 MIXED HYPERLIPIDEMIA: ICD-10-CM

## 2020-05-11 RX ORDER — ATORVASTATIN CALCIUM 10 MG/1
10 TABLET, FILM COATED ORAL DAILY
Qty: 30 TABLET | Refills: 0 | Status: SHIPPED | OUTPATIENT
Start: 2020-05-11 | End: 2020-05-11

## 2020-05-11 RX ORDER — ATORVASTATIN CALCIUM 10 MG/1
TABLET, FILM COATED ORAL
Qty: 90 TABLET | Refills: 1 | Status: SHIPPED | OUTPATIENT
Start: 2020-05-11 | End: 2020-06-15 | Stop reason: SDUPTHER

## 2020-05-11 RX ORDER — LISINOPRIL 10 MG/1
10 TABLET ORAL DAILY
Qty: 30 TABLET | Refills: 0 | Status: SHIPPED | OUTPATIENT
Start: 2020-05-11 | End: 2020-06-23

## 2020-06-08 DIAGNOSIS — E78.2 MIXED HYPERLIPIDEMIA: ICD-10-CM

## 2020-06-15 DIAGNOSIS — E78.2 MIXED HYPERLIPIDEMIA: ICD-10-CM

## 2020-06-16 RX ORDER — ATORVASTATIN CALCIUM 10 MG/1
10 TABLET, FILM COATED ORAL DAILY
Qty: 90 TABLET | Refills: 1 | Status: SHIPPED | OUTPATIENT
Start: 2020-06-16 | End: 2020-09-01

## 2020-06-23 DIAGNOSIS — I10 ESSENTIAL HYPERTENSION: ICD-10-CM

## 2020-06-23 RX ORDER — LISINOPRIL 10 MG/1
TABLET ORAL
Qty: 30 TABLET | Refills: 0 | Status: SHIPPED | OUTPATIENT
Start: 2020-06-23 | End: 2020-06-29 | Stop reason: SDUPTHER

## 2020-06-29 DIAGNOSIS — I10 ESSENTIAL HYPERTENSION: ICD-10-CM

## 2020-06-30 DIAGNOSIS — R07.89 ATYPICAL CHEST PAIN: ICD-10-CM

## 2020-06-30 DIAGNOSIS — E78.2 MIXED HYPERLIPIDEMIA: ICD-10-CM

## 2020-06-30 DIAGNOSIS — I10 ESSENTIAL HYPERTENSION: ICD-10-CM

## 2020-06-30 RX ORDER — LISINOPRIL 10 MG/1
10 TABLET ORAL DAILY
Qty: 90 TABLET | Refills: 0 | Status: SHIPPED | OUTPATIENT
Start: 2020-06-30 | End: 2020-08-06 | Stop reason: SDUPTHER

## 2020-06-30 RX ORDER — ASPIRIN 81 MG/1
81 TABLET ORAL DAILY
Qty: 90 TABLET | Refills: 1 | Status: SHIPPED | OUTPATIENT
Start: 2020-06-30 | End: 2022-02-17 | Stop reason: ALTCHOICE

## 2020-07-07 ENCOUNTER — OFFICE VISIT (OUTPATIENT)
Dept: CARDIOLOGY CLINIC | Facility: CLINIC | Age: 40
End: 2020-07-07
Payer: COMMERCIAL

## 2020-07-07 VITALS
SYSTOLIC BLOOD PRESSURE: 124 MMHG | HEART RATE: 79 BPM | OXYGEN SATURATION: 98 % | HEIGHT: 68 IN | BODY MASS INDEX: 33.8 KG/M2 | WEIGHT: 223 LBS | DIASTOLIC BLOOD PRESSURE: 88 MMHG

## 2020-07-07 DIAGNOSIS — Z82.49 FAMILY HISTORY OF PREMATURE CORONARY ARTERY DISEASE: ICD-10-CM

## 2020-07-07 DIAGNOSIS — E78.2 MIXED HYPERLIPIDEMIA: Primary | ICD-10-CM

## 2020-07-07 DIAGNOSIS — I10 ESSENTIAL HYPERTENSION: ICD-10-CM

## 2020-07-07 PROCEDURE — 3074F SYST BP LT 130 MM HG: CPT | Performed by: INTERNAL MEDICINE

## 2020-07-07 PROCEDURE — 1036F TOBACCO NON-USER: CPT | Performed by: INTERNAL MEDICINE

## 2020-07-07 PROCEDURE — 3079F DIAST BP 80-89 MM HG: CPT | Performed by: INTERNAL MEDICINE

## 2020-07-07 PROCEDURE — 3008F BODY MASS INDEX DOCD: CPT | Performed by: INTERNAL MEDICINE

## 2020-07-07 PROCEDURE — 99213 OFFICE O/P EST LOW 20 MIN: CPT | Performed by: INTERNAL MEDICINE

## 2020-07-07 NOTE — PROGRESS NOTES
[facility] [address] [address]  Cardiology Follow Up    Isac Bhattcindy  1980  [id number]      1  Mixed hyperlipidemia  Comprehensive metabolic panel    Lipid Panel with Direct LDL reflex   2  Essential hypertension  Comprehensive metabolic panel    Lipid Panel with Direct LDL reflex   3  Family history of premature coronary artery disease  Comprehensive metabolic panel    Lipid Panel with Direct LDL reflex       Chief Complaint   Patient presents with    Follow-up       Interval History:    79-year-old male with hypertension, hyperlipidemia, family history of premature coronary artery disease presented for continue to of care  patient was last seen in Cardiology Clinic in May of 2019( for chest pain and shortness of breath)  Patient is doing well since last seen in Cardiology Clinic without any new complaints  He denies chest pain, shortness of breath, palpitation, dizziness or loss of consciousness  Exercise tolerance is unlimited   he does not recall his home blood pressure readings  home medications reviewed with the patient   patient was on Crestor but change to Lipitor due to insurance change  labs from May of 2019 reviewed       no recent labs available to review   previous cardiac workup:  June of 2018 exercise treadmill stress test exercise time 13 minutes, Mets achieved 7 2, E stress ECG negative for ischemia   echo in June of 2018 showed EF 60 65% without regional wall motion abnormality  Review of Systems:   review of system negative except as mentioned above    Patient Active Problem List   Diagnosis    Mixed hyperlipidemia    Essential hypertension    Chest pain due to myocardial ischemia    Family history of premature CAD     Past Medical History:   Diagnosis Date    Atypical chest pain     Hyperlipidemia     last assessed 1/17/17     Social History     Socioeconomic History    Marital status: Single     Spouse name: Not on file    Number of children: Not on file    Years of education: Not on file    Highest education level: Not on file   Occupational History    Occupation: employed   Social Needs    Financial resource strain: Not on file    Food insecurity:     Worry: Not on file     Inability: Not on file   [email or web address] needs:     Medical: Not on file     Non-medical: Not on file   Tobacco Use    Smoking status: Former Smoker    Smokeless tobacco: Never Used   Substance and Sexual Activity    Alcohol use: Yes     Comment: social    Drug use: No    Sexual activity: Not on file   Lifestyle    Physical activity:     Days per week: Not on file     Minutes per session: Not on file    Stress: Not on file   Relationships    Social connections:     Talks on phone: Not on file     Gets together: Not on file     Attends Yarsani service: Not on file     Active member of club or organization: Not on file     Attends meetings of clubs or organizations: Not on file     Relationship status: Not on file    Intimate partner violence:     Fear of current or ex partner: Not on file     Emotionally abused: Not on file     Physically abused: Not on file     Forced sexual activity: Not on file   Other Topics Concern    Not on file   Social History Narrative    Always uses seat belt     Daily caffeinated coffee consumption    Lives with family      Family History   Problem Relation Age of Onset    Hypertension Mother     Heart disease Father         cardiac d/o    Diabetes Father     Heart attack Brother      History reviewed  No pertinent surgical history      Current Outpatient Medications:     aspirin (ECOTRIN LOW STRENGTH) 81 mg EC tablet, Take 1 tablet (81 mg total) by mouth daily, Disp: 90 tablet, Rfl: 1    atorvastatin (LIPITOR) 10 mg tablet, Take 1 tablet (10 mg total) by mouth daily, Disp: 90 tablet, Rfl: 1    lisinopril (ZESTRIL) 10 mg tablet, Take 1 tablet (10 mg total) by mouth daily, Disp: 90 tablet, Rfl: 0    Omega-3 Fatty Acids (OMEGA 3 500 PO), Take by mouth daily, Disp: , Rfl:     triamcinolone (KENALOG) 0 1 % cream, Apply topically 2 (two) times a day, Disp: 30 g, Rfl: 0    zolpidem (AMBIEN) 5 mg tablet, Take 1 tablet (5 mg total) by mouth daily at bedtime as needed for sleep, Disp: 28 tablet, Rfl: 0    Multiple Vitamin (MULTIVITAMIN) tablet, Take 1 tablet by mouth daily, Disp: , Rfl:     rosuvastatin (CRESTOR) 10 MG tablet, Take 1 tablet (10 mg total) by mouth daily (Patient not taking: Reported on 1/9/2020), Disp: 30 tablet, Rfl: 5  No Known Allergies    Labs:  No visits with results within 6 Month(s) from this visit  Latest known visit with results is:   Appointment on 05/06/2019   Component Date Value    PSA 05/06/2019 0 9     Sodium 05/06/2019 139     Potassium 05/06/2019 3 7     Chloride 05/06/2019 105     CO2 05/06/2019 29     ANION GAP 05/06/2019 5     BUN 05/06/2019 12     Creatinine 05/06/2019 0 80     Glucose, Fasting 05/06/2019 91     Calcium 05/06/2019 8 6     AST 05/06/2019 32     ALT 05/06/2019 63     Alkaline Phosphatase 05/06/2019 96     Total Protein 05/06/2019 8 3*    Albumin 05/06/2019 4 2     Total Bilirubin 05/06/2019 0 58     eGFR 05/06/2019 112     Cholesterol 05/06/2019 181     Triglycerides 05/06/2019 108     HDL, Direct 05/06/2019 46     LDL Calculated 05/06/2019 113*    Non-HDL-Chol (CHOL-HDL) 05/06/2019 135     Color, UA 05/06/2019 Yellow     Clarity, UA 05/06/2019 Clear     Specific Gravity, UA 05/06/2019 1 021     pH, UA 05/06/2019 6 0     Leukocytes, UA 05/06/2019 Negative     Nitrite, UA 05/06/2019 Negative     Protein, UA 05/06/2019 Negative     Glucose, UA 05/06/2019 Negative     Ketones, UA 05/06/2019 Negative     Urobilinogen, UA 05/06/2019 0 2     Bilirubin, UA 05/06/2019 Negative     Blood, UA 05/06/2019 Negative      Imaging: No results found      Physical Exam:  General:   obese, awake, alert and oriented x3, not in distress  Neck: supple, no JVD  Eyes:  conjunctiva normal  Lungs:  Bilateral air entry positive, no wheeze/rhonchi or crackle  Heart:  S1-S2 normal, no murmur  Abdomen:  Soft ,nondistended ,nontender, bowel sounds positive  Extremities:  No leg edema, no deformity, ROM normal  Neuro:  Moving all extremities, speech clear  Skin: warm, no rash     /88 (BP Location: Left arm, Patient Position: Sitting, Cuff Size: Large)   Pulse 79   Ht 5' 8" (1 727 m)   Wt 101 kg (223 lb)   SpO2 98%   BMI 33 91 kg/m²       Assessment:    1  Hypertension  Controlled  2  Hyperlipidemia   last year  3  Family history of premature coronary artery disease( brother had MI in his 46s)    Recommendations:   patient is doing well from cardiology standpoint  advised to continue aspirin, Lipitor and lisinopril   educated to monitor blood pressure at home and call us if it stays more than 130/80   advised to take low-salt, low-fat/low-cholesterol diet and lose weight   I will get repeat CMP and lipid panel  Up titration of statins depending on will lipid panel results   patient advised to follow-up with primary care physician   return to clinic in 1 year or earlier as needed   above all discussed with patient    Patient understands and agrees

## 2020-08-06 DIAGNOSIS — I10 ESSENTIAL HYPERTENSION: ICD-10-CM

## 2020-08-06 RX ORDER — LISINOPRIL 10 MG/1
10 TABLET ORAL DAILY
Qty: 90 TABLET | Refills: 0 | Status: SHIPPED | OUTPATIENT
Start: 2020-08-06 | End: 2020-08-17 | Stop reason: SDUPTHER

## 2020-08-10 RX ORDER — ATORVASTATIN CALCIUM 10 MG/1
TABLET, FILM COATED ORAL
Qty: 30 TABLET | Refills: 0 | OUTPATIENT
Start: 2020-08-10

## 2020-08-17 ENCOUNTER — OFFICE VISIT (OUTPATIENT)
Dept: FAMILY MEDICINE CLINIC | Facility: CLINIC | Age: 40
End: 2020-08-17
Payer: COMMERCIAL

## 2020-08-17 VITALS
RESPIRATION RATE: 12 BRPM | TEMPERATURE: 97.2 F | OXYGEN SATURATION: 98 % | DIASTOLIC BLOOD PRESSURE: 80 MMHG | BODY MASS INDEX: 33.95 KG/M2 | SYSTOLIC BLOOD PRESSURE: 130 MMHG | HEART RATE: 109 BPM | WEIGHT: 224 LBS | HEIGHT: 68 IN

## 2020-08-17 DIAGNOSIS — G47.00 INSOMNIA, UNSPECIFIED TYPE: ICD-10-CM

## 2020-08-17 DIAGNOSIS — F43.9 SITUATIONAL STRESS: ICD-10-CM

## 2020-08-17 DIAGNOSIS — I10 ESSENTIAL HYPERTENSION: Primary | ICD-10-CM

## 2020-08-17 DIAGNOSIS — Z12.5 ENCOUNTER FOR PROSTATE CANCER SCREENING: ICD-10-CM

## 2020-08-17 DIAGNOSIS — E78.2 MIXED HYPERLIPIDEMIA: ICD-10-CM

## 2020-08-17 DIAGNOSIS — F32.A DEPRESSION, UNSPECIFIED DEPRESSION TYPE: ICD-10-CM

## 2020-08-17 PROCEDURE — 3725F SCREEN DEPRESSION PERFORMED: CPT | Performed by: FAMILY MEDICINE

## 2020-08-17 PROCEDURE — 99214 OFFICE O/P EST MOD 30 MIN: CPT | Performed by: FAMILY MEDICINE

## 2020-08-17 PROCEDURE — 3075F SYST BP GE 130 - 139MM HG: CPT | Performed by: FAMILY MEDICINE

## 2020-08-17 PROCEDURE — 3008F BODY MASS INDEX DOCD: CPT | Performed by: FAMILY MEDICINE

## 2020-08-17 PROCEDURE — 1036F TOBACCO NON-USER: CPT | Performed by: FAMILY MEDICINE

## 2020-08-17 PROCEDURE — 3079F DIAST BP 80-89 MM HG: CPT | Performed by: FAMILY MEDICINE

## 2020-08-17 RX ORDER — LISINOPRIL 20 MG/1
20 TABLET ORAL DAILY
Qty: 30 TABLET | Refills: 3 | Status: SHIPPED | OUTPATIENT
Start: 2020-08-17 | End: 2020-09-08 | Stop reason: SDUPTHER

## 2020-08-17 NOTE — PROGRESS NOTES
BMI Counseling: Body mass index is 34 06 kg/m²  The BMI is above normal  Nutrition recommendations include decreasing portion sizes, decreasing fast food intake, limiting drinks that contain sugar, moderation in carbohydrate intake and reducing intake of saturated and trans fat  Exercise recommendations include moderate physical activity 150 minutes/week and exercising 3-5 times per week  No pharmacotherapy was ordered  Depression Screening and Follow-up Plan: Patient's depression screening was positive with a PHQ-2 score of 0  Clincally patient does not have depression  No treatment is required  Assessment/Plan:     Chronic Problems:  No problem-specific Assessment & Plan notes found for this encounter  Visit Diagnosis:  Diagnoses and all orders for this visit:    Essential hypertension  -     lisinopril (ZESTRIL) 20 mg tablet; Take 1 tablet (20 mg total) by mouth daily  -     CBC and differential; Future  -     TSH, 3rd generation; Future  -     Lipid panel; Future  -     Microalbumin / creatinine urine ratio; Future  -     PSA, total and free; Future  -     UA w Reflex to Microscopic w Reflex to Culture -Lab Collect    Mixed hyperlipidemia  -     CBC and differential; Future  -     TSH, 3rd generation; Future  -     Lipid panel; Future  -     Microalbumin / creatinine urine ratio; Future  -     PSA, total and free; Future  -     UA w Reflex to Microscopic w Reflex to Culture -Lab Collect    Situational stress  -     CBC and differential; Future  -     TSH, 3rd generation; Future  -     Lipid panel; Future  -     Microalbumin / creatinine urine ratio; Future  -     PSA, total and free; Future  -     UA w Reflex to Microscopic w Reflex to Culture -Lab Collect    Depression, unspecified depression type  -     CBC and differential; Future  -     TSH, 3rd generation; Future  -     Lipid panel; Future  -     Microalbumin / creatinine urine ratio; Future  -     PSA, total and free;  Future  -     UA w Reflex to Microscopic w Reflex to Culture -Lab Collect    Encounter for prostate cancer screening  -     CBC and differential; Future  -     TSH, 3rd generation; Future  -     Lipid panel; Future  -     Microalbumin / creatinine urine ratio; Future  -     PSA, total and free; Future  -     UA w Reflex to Microscopic w Reflex to Culture -Lab Collect    Insomnia, unspecified type    htn  Above goal, will increase lisinopril to 20 mg p o  q day recheck BMP approximately week, encouraged home monitoring,    Discussed goals of therapy main so blood pressure numbers in the 120s over 70s, reviewed medications indications and side effect profiles, discussed dosing, recommended diet modification such as salt and sodium restriction with weight loss program   Recommended regular routine exercise and stretching program   hyperlipidemia   stable, did continue current medications, discussed goals therapy recheck lipid profile   depression/anxiety   situational, secondary to current pandemic issues, discussed stress management self-help mechanisms, continue current   insomnia   stable, no issues reviewed medications safety and dosing        Subjective:    Patient ID: Mary Ann Triplett is a 38 y o  male  Check up   Doing OK , depression has been manageable considering the current situation   Self business , 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 , 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    Exercise has been off , diet could be improved, will be better   Cardiology = md   meds unchanged sleep , ambien rare use , but effective   Recent found brother to have prostate   Brother 53yr old    cholesterol, no issues with current medications Lipitor 10 mg p o    Htn, continues with lisinopril 10 mg p o , denies any issues with medications checks home blood pressures numbers consistent with today's discussed goals  Negative chest pain palpitations shortness of breath difficulty breathing cough lesions rash   denies any current urinary issues negative nocturia frequency urgency negative straight change in st      The following portions of the patient's history were reviewed and updated as appropriate: allergies, current medications, past family history, past medical history, past social history, past surgical history and problem list     Review of Systems   Constitutional: Negative for appetite change, chills, fever and unexpected weight change  HENT: Negative for congestion, dental problem, ear pain, hearing loss, postnasal drip, rhinorrhea, sinus pressure, sinus pain, sneezing, sore throat, tinnitus and voice change  Eyes: Negative for visual disturbance  Respiratory: Negative for apnea, cough, chest tightness and shortness of breath  Cardiovascular: Negative for chest pain, palpitations and leg swelling  Gastrointestinal: Negative for abdominal pain, blood in stool, constipation, diarrhea, nausea and vomiting  Endocrine: Negative for cold intolerance, heat intolerance, polydipsia, polyphagia and polyuria  Genitourinary: Negative for decreased urine volume, difficulty urinating, dysuria, frequency and hematuria  Musculoskeletal: Negative for arthralgias, back pain, gait problem, joint swelling and myalgias  Skin: Negative for color change, rash and wound  Allergic/Immunologic: Negative for environmental allergies and food allergies  Neurological: Negative for dizziness, syncope, weakness, light-headedness, numbness and headaches  Hematological: Negative for adenopathy  Does not bruise/bleed easily  Psychiatric/Behavioral: Negative for sleep disturbance and suicidal ideas  The patient is not nervous/anxious            /80 (BP Location: Left leg, Patient Position: Sitting, Cuff Size: Adult)   Pulse (!) 109   Temp (!) 97 2 °F (36 2 °C)   Resp 12   Ht 5' 8" (1 727 m)   Wt 102 kg (224 lb)   SpO2 98%   BMI 34 06 kg/m²   Social History     Socioeconomic History    Marital status: Single     Spouse name: Not on file    Number of children: Not on file    Years of education: Not on file    Highest education level: Not on file   Occupational History    Occupation: employed   Social Needs    Financial resource strain: Not on file    Food insecurity     Worry: Not on file     Inability: Not on file   [employer] needs     Medical: Not on file     Non-medical: Not on file   Tobacco Use    Smoking status: Former Smoker    Smokeless tobacco: Never Used   Substance and Sexual Activity    Alcohol use: Yes     Comment: social    Drug use: No    Sexual activity: Not on file   Lifestyle    Physical activity     Days per week: Not on file     Minutes per session: Not on file    Stress: Not on file   Relationships    Social connections     Talks on phone: Not on file     Gets together: Not on file     Attends Mandaen service: Not on file     Active member of club or organization: Not on file     Attends meetings of clubs or organizations: Not on file     Relationship status: Not on file    Intimate partner violence     Fear of current or ex partner: Not on file     Emotionally abused: Not on file     Physically abused: Not on file     Forced sexual activity: Not on file   Other Topics Concern    Not on file   Social History Narrative    Always uses seat belt     Daily caffeinated coffee consumption    Lives with family     Past Medical History:   Diagnosis Date    Atypical chest pain     Hyperlipidemia     last assessed 1/17/17     Family History   Problem Relation Age of Onset    Hypertension Mother     Heart disease Father         cardiac d/o    Diabetes Father     Heart attack Brother      No past surgical history on file      Current Outpatient Medications:     aspirin (ECOTRIN LOW STRENGTH) 81 mg EC tablet, Take 1 tablet (81 mg total) by mouth daily, Disp: 90 tablet, Rfl: 1    atorvastatin (LIPITOR) 10 mg tablet, Take 1 tablet (10 mg total) by mouth daily, Disp: 90 tablet, Rfl: 1    lisinopril (ZESTRIL) 20 mg tablet, Take 1 tablet (20 mg total) by mouth daily, Disp: 30 tablet, Rfl: 3    Multiple Vitamin (MULTIVITAMIN) tablet, Take 1 tablet by mouth daily, Disp: , Rfl:     Omega-3 Fatty Acids (OMEGA 3 500 PO), Take by mouth daily, Disp: , Rfl:     triamcinolone (KENALOG) 0 1 % cream, Apply topically 2 (two) times a day, Disp: 30 g, Rfl: 0    zolpidem (AMBIEN) 5 mg tablet, Take 1 tablet (5 mg total) by mouth daily at bedtime as needed for sleep, Disp: 28 tablet, Rfl: 0    No Known Allergies       Lab Review   No visits with results within 6 Month(s) from this visit  Latest known visit with results is:   Appointment on 05/06/2019   Component Date Value    PSA 05/06/2019 0 9     Sodium 05/06/2019 139     Potassium 05/06/2019 3 7     Chloride 05/06/2019 105     CO2 05/06/2019 29     ANION GAP 05/06/2019 5     BUN 05/06/2019 12     Creatinine 05/06/2019 0 80     Glucose, Fasting 05/06/2019 91     Calcium 05/06/2019 8 6     AST 05/06/2019 32     ALT 05/06/2019 63     Alkaline Phosphatase 05/06/2019 96     Total Protein 05/06/2019 8 3*    Albumin 05/06/2019 4 2     Total Bilirubin 05/06/2019 0 58     eGFR 05/06/2019 112     Cholesterol 05/06/2019 181     Triglycerides 05/06/2019 108     HDL, Direct 05/06/2019 46     LDL Calculated 05/06/2019 113*    Non-HDL-Chol (CHOL-HDL) 05/06/2019 135     Color, UA 05/06/2019 Yellow     Clarity, UA 05/06/2019 Clear     Specific Gravity, UA 05/06/2019 1 021     pH, UA 05/06/2019 6 0     Leukocytes, UA 05/06/2019 Negative     Nitrite, UA 05/06/2019 Negative     Protein, UA 05/06/2019 Negative     Glucose, UA 05/06/2019 Negative     Ketones, UA 05/06/2019 Negative     Urobilinogen, UA 05/06/2019 0 2     Bilirubin, UA 05/06/2019 Negative     Blood, UA 05/06/2019 Negative         Imaging: No results found  Objective:     Physical Exam  Constitutional:       Appearance: Normal appearance  He is well-developed  HENT:      Head: Normocephalic and atraumatic  Neck:      Musculoskeletal: Normal range of motion and neck supple  Cardiovascular:      Rate and Rhythm: Normal rate and regular rhythm  Heart sounds: Normal heart sounds  Pulmonary:      Effort: Pulmonary effort is normal       Breath sounds: Normal breath sounds  Abdominal:      General: Bowel sounds are normal       Palpations: Abdomen is soft  Musculoskeletal: Normal range of motion  Skin:     General: Skin is warm and dry  Neurological:      Mental Status: He is alert and oriented to person, place, and time  Deep Tendon Reflexes: Reflexes are normal and symmetric  Psychiatric:         Behavior: Behavior normal          Thought Content: Thought content normal          Judgment: Judgment normal            There are no Patient Instructions on file for this visit  PITA Pagan    Portions of the record may have been created with voice recognition software  Occasional wrong word or "sound a like" substitutions may have occurred due to the inherent limitations of voice recognition software  Read the chart carefully and recognize, using context, where substitutions have occurred

## 2020-09-01 ENCOUNTER — APPOINTMENT (OUTPATIENT)
Dept: LAB | Facility: CLINIC | Age: 40
End: 2020-09-01
Payer: COMMERCIAL

## 2020-09-01 DIAGNOSIS — Z82.49 FAMILY HISTORY OF PREMATURE CORONARY ARTERY DISEASE: ICD-10-CM

## 2020-09-01 DIAGNOSIS — F32.A DEPRESSION, UNSPECIFIED DEPRESSION TYPE: ICD-10-CM

## 2020-09-01 DIAGNOSIS — F43.9 SITUATIONAL STRESS: ICD-10-CM

## 2020-09-01 DIAGNOSIS — E78.2 MIXED HYPERLIPIDEMIA: ICD-10-CM

## 2020-09-01 DIAGNOSIS — I10 ESSENTIAL HYPERTENSION: ICD-10-CM

## 2020-09-01 DIAGNOSIS — Z12.5 ENCOUNTER FOR PROSTATE CANCER SCREENING: ICD-10-CM

## 2020-09-01 LAB
ALBUMIN SERPL BCP-MCNC: 4.3 G/DL (ref 3.5–5)
ALP SERPL-CCNC: 92 U/L (ref 46–116)
ALT SERPL W P-5'-P-CCNC: 73 U/L (ref 12–78)
ANION GAP SERPL CALCULATED.3IONS-SCNC: 4 MMOL/L (ref 4–13)
AST SERPL W P-5'-P-CCNC: 31 U/L (ref 5–45)
BASOPHILS # BLD AUTO: 0.05 THOUSANDS/ΜL (ref 0–0.1)
BASOPHILS NFR BLD AUTO: 1 % (ref 0–1)
BILIRUB SERPL-MCNC: 0.39 MG/DL (ref 0.2–1)
BILIRUB UR QL STRIP: NEGATIVE
BUN SERPL-MCNC: 11 MG/DL (ref 5–25)
CALCIUM SERPL-MCNC: 9 MG/DL (ref 8.3–10.1)
CHLORIDE SERPL-SCNC: 107 MMOL/L (ref 100–108)
CHOLEST SERPL-MCNC: 175 MG/DL (ref 50–200)
CLARITY UR: CLEAR
CO2 SERPL-SCNC: 27 MMOL/L (ref 21–32)
COLOR UR: YELLOW
CREAT SERPL-MCNC: 0.78 MG/DL (ref 0.6–1.3)
CREAT UR-MCNC: 192 MG/DL
EOSINOPHIL # BLD AUTO: 0.22 THOUSAND/ΜL (ref 0–0.61)
EOSINOPHIL NFR BLD AUTO: 3 % (ref 0–6)
ERYTHROCYTE [DISTWIDTH] IN BLOOD BY AUTOMATED COUNT: 11.6 % (ref 11.6–15.1)
GFR SERPL CREATININE-BSD FRML MDRD: 112 ML/MIN/1.73SQ M
GLUCOSE P FAST SERPL-MCNC: 90 MG/DL (ref 65–99)
GLUCOSE UR STRIP-MCNC: NEGATIVE MG/DL
HCT VFR BLD AUTO: 43.2 % (ref 36.5–49.3)
HDLC SERPL-MCNC: 40 MG/DL
HGB BLD-MCNC: 13.3 G/DL (ref 12–17)
HGB UR QL STRIP.AUTO: NEGATIVE
IMM GRANULOCYTES # BLD AUTO: 0.01 THOUSAND/UL (ref 0–0.2)
IMM GRANULOCYTES NFR BLD AUTO: 0 % (ref 0–2)
KETONES UR STRIP-MCNC: NEGATIVE MG/DL
LDLC SERPL CALC-MCNC: 102 MG/DL (ref 0–100)
LEUKOCYTE ESTERASE UR QL STRIP: NEGATIVE
LYMPHOCYTES # BLD AUTO: 1.7 THOUSANDS/ΜL (ref 0.6–4.47)
LYMPHOCYTES NFR BLD AUTO: 26 % (ref 14–44)
MCH RBC QN AUTO: 28.2 PG (ref 26.8–34.3)
MCHC RBC AUTO-ENTMCNC: 30.8 G/DL (ref 31.4–37.4)
MCV RBC AUTO: 92 FL (ref 82–98)
MICROALBUMIN UR-MCNC: 7.4 MG/L (ref 0–20)
MICROALBUMIN/CREAT 24H UR: 4 MG/G CREATININE (ref 0–30)
MONOCYTES # BLD AUTO: 0.34 THOUSAND/ΜL (ref 0.17–1.22)
MONOCYTES NFR BLD AUTO: 5 % (ref 4–12)
NEUTROPHILS # BLD AUTO: 4.27 THOUSANDS/ΜL (ref 1.85–7.62)
NEUTS SEG NFR BLD AUTO: 65 % (ref 43–75)
NITRITE UR QL STRIP: NEGATIVE
NRBC BLD AUTO-RTO: 0 /100 WBCS
PH UR STRIP.AUTO: 6 [PH]
PLATELET # BLD AUTO: 363 THOUSANDS/UL (ref 149–390)
PMV BLD AUTO: 10.2 FL (ref 8.9–12.7)
POTASSIUM SERPL-SCNC: 4.1 MMOL/L (ref 3.5–5.3)
PROT SERPL-MCNC: 8.5 G/DL (ref 6.4–8.2)
PROT UR STRIP-MCNC: NEGATIVE MG/DL
RBC # BLD AUTO: 4.72 MILLION/UL (ref 3.88–5.62)
SODIUM SERPL-SCNC: 138 MMOL/L (ref 136–145)
SP GR UR STRIP.AUTO: 1.02 (ref 1–1.03)
TRIGL SERPL-MCNC: 165 MG/DL
TSH SERPL DL<=0.05 MIU/L-ACNC: 0.84 UIU/ML (ref 0.36–3.74)
UROBILINOGEN UR QL STRIP.AUTO: 0.2 E.U./DL
WBC # BLD AUTO: 6.59 THOUSAND/UL (ref 4.31–10.16)

## 2020-09-01 PROCEDURE — 82043 UR ALBUMIN QUANTITATIVE: CPT

## 2020-09-01 PROCEDURE — 84443 ASSAY THYROID STIM HORMONE: CPT

## 2020-09-01 PROCEDURE — 82570 ASSAY OF URINE CREATININE: CPT

## 2020-09-01 PROCEDURE — 84154 ASSAY OF PSA FREE: CPT

## 2020-09-01 PROCEDURE — 36415 COLL VENOUS BLD VENIPUNCTURE: CPT | Performed by: INTERNAL MEDICINE

## 2020-09-01 PROCEDURE — 81003 URINALYSIS AUTO W/O SCOPE: CPT | Performed by: FAMILY MEDICINE

## 2020-09-01 PROCEDURE — 84153 ASSAY OF PSA TOTAL: CPT

## 2020-09-01 PROCEDURE — 80053 COMPREHEN METABOLIC PANEL: CPT | Performed by: INTERNAL MEDICINE

## 2020-09-01 PROCEDURE — 85025 COMPLETE CBC W/AUTO DIFF WBC: CPT

## 2020-09-01 PROCEDURE — 80061 LIPID PANEL: CPT

## 2020-09-01 RX ORDER — ATORVASTATIN CALCIUM 20 MG/1
20 TABLET, FILM COATED ORAL
Qty: 90 TABLET | Refills: 3 | Status: SHIPPED | OUTPATIENT
Start: 2020-09-01 | End: 2021-08-30

## 2020-09-04 LAB
PSA FREE MFR SERPL: 20 %
PSA FREE SERPL-MCNC: 0.2 NG/ML
PSA SERPL-MCNC: 1 NG/ML (ref 0–4)

## 2020-09-08 ENCOUNTER — OFFICE VISIT (OUTPATIENT)
Dept: FAMILY MEDICINE CLINIC | Facility: CLINIC | Age: 40
End: 2020-09-08
Payer: COMMERCIAL

## 2020-09-08 VITALS
HEIGHT: 68 IN | RESPIRATION RATE: 14 BRPM | DIASTOLIC BLOOD PRESSURE: 75 MMHG | TEMPERATURE: 97 F | HEART RATE: 93 BPM | BODY MASS INDEX: 33.56 KG/M2 | WEIGHT: 221.4 LBS | OXYGEN SATURATION: 97 % | SYSTOLIC BLOOD PRESSURE: 125 MMHG

## 2020-09-08 DIAGNOSIS — Z00.00 WELL ADULT EXAM: Primary | ICD-10-CM

## 2020-09-08 DIAGNOSIS — E78.2 MIXED HYPERLIPIDEMIA: ICD-10-CM

## 2020-09-08 DIAGNOSIS — I10 ESSENTIAL HYPERTENSION: ICD-10-CM

## 2020-09-08 PROCEDURE — 3078F DIAST BP <80 MM HG: CPT | Performed by: FAMILY MEDICINE

## 2020-09-08 PROCEDURE — 1036F TOBACCO NON-USER: CPT | Performed by: FAMILY MEDICINE

## 2020-09-08 PROCEDURE — 99396 PREV VISIT EST AGE 40-64: CPT | Performed by: FAMILY MEDICINE

## 2020-09-08 PROCEDURE — 3725F SCREEN DEPRESSION PERFORMED: CPT | Performed by: FAMILY MEDICINE

## 2020-09-08 RX ORDER — LISINOPRIL 20 MG/1
20 TABLET ORAL DAILY
Qty: 90 TABLET | Refills: 1 | Status: SHIPPED | OUTPATIENT
Start: 2020-09-08 | End: 2020-09-29 | Stop reason: SDUPTHER

## 2020-09-08 NOTE — PROGRESS NOTES
Assessment/Plan:     Chronic Problems:  No problem-specific Assessment & Plan notes found for this encounter  Visit Diagnosis:  Diagnoses and all orders for this visit:    Well adult exam    Essential hypertension  -     lisinopril (ZESTRIL) 20 mg tablet; Take 1 tablet (20 mg total) by mouth daily    Mixed hyperlipidemia    well adult   Wellness Visit for Adults   AMBULATORY CARE:   A wellness visit  is when you see your healthcare provider to get screened for health problems  You can also get advice on how to stay healthy  Write down your questions so you remember to ask them  Ask your healthcare provider how often you should have a wellness visit  What happens at a wellness visit:  Your healthcare provider will ask about your health, and your family history of health problems  This includes high blood pressure, heart disease, and cancer  He or she will ask if you have symptoms that concern you, if you smoke, and about your mood  You may also be asked about your intake of medicines, supplements, food, and alcohol  Any of the following may be done:  · Your weight  will be checked  Your height may also be checked so your body mass index (BMI) can be calculated  Your BMI shows if you are at a healthy weight  · Your blood pressure  and heart rate will be checked  Your temperature may also be checked  · Blood and urine tests  may be done  Blood tests may be done to check your cholesterol levels  Abnormal cholesterol levels increase your risk for heart disease and stroke  You may also need a blood or urine test to check for diabetes if you are at increased risk  Urine tests may be done to look for signs of an infection or kidney disease  · A physical exam  includes checking your heartbeat and lungs with a stethoscope  Your healthcare provider may also check your skin to look for sun damage  · Screening tests  may be recommended   A screening test is done to check for diseases that may not cause symptoms  The screening tests you may need depend on your age, gender, family history, and lifestyle habits  For example, colorectal screening may be recommended if you are 48years old or older  Screening tests you need if you are a woman:   · A Pap smear  is used to screen for cervical cancer  Pap smears are usually done every 3 to 5 years depending on your age  You may need them more often if you have had abnormal Pap smear test results in the past  Ask your healthcare provider how often you should have a Pap smear  · A mammogram  is an x-ray of your breasts to screen for breast cancer  Experts recommend mammograms every 2 years starting at age 48 years  You may need a mammogram at age 52 years or younger if you have an increased risk for breast cancer  Talk to your healthcare provider about when you should start having mammograms and how often you need them  Vaccines you may need:   · Get an influenza vaccine  every year  The influenza vaccine protects you from the flu  Several types of viruses cause the flu  The viruses change over time, so new vaccines are made each year  · Get a tetanus-diphtheria (Td) booster vaccine  every 10 years  This vaccine protects you against tetanus and diphtheria  Tetanus is a severe infection that may cause painful muscle spasms and lockjaw  Diphtheria is a severe bacterial infection that causes a thick covering in the back of your mouth and throat  · Get a human papillomavirus (HPV) vaccine  if you are female and aged 23 to 32 or male 23 to 24 and never received it  This vaccine protects you from HPV infection  HPV is the most common infection spread by sexual contact  HPV may also cause vaginal, penile, and anal cancers  · Get a pneumococcal vaccine  if you are aged 72 years or older  The pneumococcal vaccine is an injection given to protect you from pneumococcal disease  Pneumococcal disease is an infection caused by pneumococcal bacteria   The infection may cause pneumonia, meningitis, or an ear infection  · Get a shingles vaccine  if you are aged 61 or older, even if you have had shingles before  The shingles vaccine is an injection to protect you from the varicella-zoster virus  This is the same virus that causes chickenpox  Shingles is a painful rash that develops in people who had chickenpox or have been exposed to the virus  How to eat healthy:  My Plate is a model for planning healthy meals  It shows the types and amounts of foods that should go on your plate  Fruits and vegetables make up about half of your plate, and grains and protein make up the other half  A serving of dairy is included on the side of your plate  The amount of calories and serving sizes you need depends on your age, gender, weight, and height  Examples of healthy foods are listed below:  · Eat a variety of vegetables  such as dark green, red, and orange vegetables  You can also include canned vegetables low in sodium (salt) and frozen vegetables without added butter or sauces  · Eat a variety of fresh fruits , canned fruit in 100% juice, frozen fruit, and dried fruit  · Include whole grains  At least half of the grains you eat should be whole grains  Examples include whole-wheat bread, wheat pasta, brown rice, and whole-grain cereals such as oatmeal     · Eat a variety of protein foods such as seafood (fish and shellfish), lean meat, and poultry without skin (turkey and chicken)  Examples of lean meats include pork leg, shoulder, or tenderloin, and beef round, sirloin, tenderloin, and extra lean ground beef  Other protein foods include eggs and egg substitutes, beans, peas, soy products, nuts, and seeds  · Choose low-fat dairy products such as skim or 1% milk or low-fat yogurt, cheese, and cottage cheese  · Limit unhealthy fats  such as butter, hard margarine, and shortening  Exercise:  Exercise at least 30 minutes per day on most days of the week   Some examples of exercise include walking, biking, dancing, and swimming  You can also fit in more physical activity by taking the stairs instead of the elevator or parking farther away from stores  Include muscle strengthening activities 2 days each week  Regular exercise provides many health benefits  It helps you manage your weight, and decreases your risk for type 2 diabetes, heart disease, stroke, and high blood pressure  Exercise can also help improve your mood  Ask your healthcare provider about the best exercise plan for you  General health and safety guidelines:   · Do not smoke  Nicotine and other chemicals in cigarettes and cigars can cause lung damage  Ask your healthcare provider for information if you currently smoke and need help to quit  E-cigarettes or smokeless tobacco still contain nicotine  Talk to your healthcare provider before you use these products  · Limit alcohol  A drink of alcohol is 12 ounces of beer, 5 ounces of wine, or 1½ ounces of liquor  · Lose weight, if needed  Being overweight increases your risk of certain health conditions  These include heart disease, high blood pressure, type 2 diabetes, and certain types of cancer  · Protect your skin  Do not sunbathe or use tanning beds  Use sunscreen with a SPF 15 or higher  Apply sunscreen at least 15 minutes before you go outside  Reapply sunscreen every 2 hours  Wear protective clothing, hats, and sunglasses when you are outside  · Drive safely  Always wear your seatbelt  Make sure everyone in your car wears a seatbelt  A seatbelt can save your life if you are in an accident  Do not use your cell phone when you are driving  This could distract you and cause an accident  Pull over if you need to make a call or send a text message  · Practice safe sex  Use latex condoms if are sexually active and have more than one partner   Your healthcare provider may recommend screening tests for sexually transmitted infections (STIs)  · Wear helmets, lifejackets, and protective gear  Always wear a helmet when you ride a bike or motorcycle, go skiing, or play sports that could cause a head injury  Wear protective equipment when you play sports  Wear a lifejacket when you are on a boat or doing water sports  © 2017 [employer] Information is for End User's use only and may not be sold, redistributed or otherwise used for commercial purposes  All illustrations and images included in CareNotes® are the copyrighted property of [employer]  or Ed [employer]  The above information is an  only  It is not intended as medical advice for individual conditions or treatments  Talk to your doctor, nurse or pharmacist before following any medical regimen to see if it is safe and effective for you       htn   Stable , continue present meds rx sent   hyperlipid   Stable , diety modifications discussed , statin continue       Subjective:    Patient ID: Rabia Philip is a 38 y o  male  Here for annual , check up   htn , meds unchanged , no missed meds ,   Understand the mahendra to make changes to the diet   Chol now on the lipitor 20mg   Has not been in an active exercise program ,   Sleep has been fair , related to home family stressors , Jannine Muro is effective , not using on a regular basis   Negative chest pain palpitations shortness of breath difficulty breathing cough lesions rash  Exercise could be better , needs more discipline       The following portions of the patient's history were reviewed and updated as appropriate: allergies, current medications, past family history, past medical history, past social history, past surgical history and problem list     Review of Systems   Constitutional: Negative for appetite change, chills, fever and unexpected weight change     HENT: Negative for congestion, dental problem, ear pain, hearing loss, postnasal drip, rhinorrhea, sinus pressure, sinus pain, sneezing, sore throat, tinnitus and voice change  Eyes: Negative for visual disturbance  Respiratory: Negative for apnea, cough, chest tightness and shortness of breath  Cardiovascular: Negative for chest pain, palpitations and leg swelling  Gastrointestinal: Negative for abdominal pain, blood in stool, constipation, diarrhea, nausea and vomiting  Endocrine: Negative for cold intolerance, heat intolerance, polydipsia, polyphagia and polyuria  Genitourinary: Negative for decreased urine volume, difficulty urinating, dysuria, frequency and hematuria  Musculoskeletal: Negative for arthralgias, back pain, gait problem, joint swelling and myalgias  Skin: Negative for color change, rash and wound  Allergic/Immunologic: Negative for environmental allergies and food allergies  Neurological: Negative for dizziness, syncope, weakness, light-headedness, numbness and headaches  Hematological: Negative for adenopathy  Does not bruise/bleed easily  Psychiatric/Behavioral: Negative for sleep disturbance and suicidal ideas  The patient is not nervous/anxious            /75 (BP Location: Left arm, Patient Position: Sitting, Cuff Size: Adult)   Pulse 93   Temp (!) 97 °F (36 1 °C)   Resp 14   Ht 5' 8" (1 727 m)   Wt 100 kg (221 lb 6 4 oz)   SpO2 97%   BMI 33 66 kg/m²   Social History     Socioeconomic History    Marital status: Single     Spouse name: Not on file    Number of children: Not on file    Years of education: Not on file    Highest education level: Not on file   Occupational History    Occupation: employed   Social Needs    Financial resource strain: Not on file    Food insecurity     Worry: Not on file     Inability: Not on file   [employer] needs     Medical: Not on file     Non-medical: Not on file   Tobacco Use    Smoking status: Former Smoker    Smokeless tobacco: Never Used   Substance and Sexual Activity    Alcohol use: Yes     Comment: social    Drug use: No    Sexual activity: Not on file   Lifestyle    Physical activity     Days per week: Not on file     Minutes per session: Not on file    Stress: Not on file   Relationships    Social connections     Talks on phone: Not on file     Gets together: Not on file     Attends Orthodox service: Not on file     Active member of club or organization: Not on file     Attends meetings of clubs or organizations: Not on file     Relationship status: Not on file    Intimate partner violence     Fear of current or ex partner: Not on file     Emotionally abused: Not on file     Physically abused: Not on file     Forced sexual activity: Not on file   Other Topics Concern    Not on file   Social History Narrative    Always uses seat belt     Daily caffeinated coffee consumption    Lives with family     Past Medical History:   Diagnosis Date    Atypical chest pain     Hyperlipidemia     last assessed 1/17/17     Family History   Problem Relation Age of Onset    Hypertension Mother     Heart disease Father         cardiac d/o    Diabetes Father     Heart attack Brother      History reviewed  No pertinent surgical history      Current Outpatient Medications:     aspirin (ECOTRIN LOW STRENGTH) 81 mg EC tablet, Take 1 tablet (81 mg total) by mouth daily, Disp: 90 tablet, Rfl: 1    atorvastatin (LIPITOR) 20 mg tablet, Take 1 tablet (20 mg total) by mouth daily at bedtime, Disp: 90 tablet, Rfl: 3    lisinopril (ZESTRIL) 20 mg tablet, Take 1 tablet (20 mg total) by mouth daily, Disp: 90 tablet, Rfl: 1    Multiple Vitamin (MULTIVITAMIN) tablet, Take 1 tablet by mouth daily, Disp: , Rfl:     Omega-3 Fatty Acids (OMEGA 3 500 PO), Take by mouth daily, Disp: , Rfl:     triamcinolone (KENALOG) 0 1 % cream, Apply topically 2 (two) times a day, Disp: 30 g, Rfl: 0    zolpidem (AMBIEN) 5 mg tablet, Take 1 tablet (5 mg total) by mouth daily at bedtime as needed for sleep, Disp: 28 tablet, Rfl: 0    No Known Allergies       Lab Review Appointment on 09/01/2020   Component Date Value    Cholesterol 09/01/2020 175     Triglycerides 09/01/2020 165*    HDL, Direct 09/01/2020 40     LDL Calculated 09/01/2020 102*    WBC 09/01/2020 6 59     RBC 09/01/2020 4 72     Hemoglobin 09/01/2020 13 3     Hematocrit 09/01/2020 43 2     MCV 09/01/2020 92     MCH 09/01/2020 28 2     MCHC 09/01/2020 30 8*    RDW 09/01/2020 11 6     MPV 09/01/2020 10 2     Platelets 78/74/2613 363     nRBC 09/01/2020 0     Neutrophils Relative 09/01/2020 65     Immat GRANS % 09/01/2020 0     Lymphocytes Relative 09/01/2020 26     Monocytes Relative 09/01/2020 5     Eosinophils Relative 09/01/2020 3     Basophils Relative 09/01/2020 1     Neutrophils Absolute 09/01/2020 4 27     Immature Grans Absolute 09/01/2020 0 01     Lymphocytes Absolute 09/01/2020 1 70     Monocytes Absolute 09/01/2020 0 34     Eosinophils Absolute 09/01/2020 0 22     Basophils Absolute 09/01/2020 0 05     TSH 3RD GENERATON 09/01/2020 0 845     Creatinine, Ur 09/01/2020 192 0     Microalbum  ,U,Random 09/01/2020 7 4     Microalb Creat Ratio 09/01/2020 4     Prostate Specific Antige* 09/01/2020 1 0     PSA, Free 09/01/2020 0 20     PSA, Free Pct 09/01/2020 20 0    Office Visit on 08/17/2020   Component Date Value    Color, UA 09/01/2020 Yellow     Clarity, UA 09/01/2020 Clear     Specific Gravity, UA 09/01/2020 1 022     pH, UA 09/01/2020 6 0     Leukocytes, UA 09/01/2020 Negative     Nitrite, UA 09/01/2020 Negative     Protein, UA 09/01/2020 Negative     Glucose, UA 09/01/2020 Negative     Ketones, UA 09/01/2020 Negative     Urobilinogen, UA 09/01/2020 0 2     Bilirubin, UA 09/01/2020 Negative     Blood, UA 09/01/2020 Negative    Office Visit on 07/07/2020   Component Date Value    Sodium 09/01/2020 138     Potassium 09/01/2020 4 1     Chloride 09/01/2020 107     CO2 09/01/2020 27     ANION GAP 09/01/2020 4     BUN 09/01/2020 11     Creatinine 09/01/2020 0 78  Glucose, Fasting 09/01/2020 90     Calcium 09/01/2020 9 0     AST 09/01/2020 31     ALT 09/01/2020 73     Alkaline Phosphatase 09/01/2020 92     Total Protein 09/01/2020 8 5*    Albumin 09/01/2020 4 3     Total Bilirubin 09/01/2020 0 39     eGFR 09/01/2020 112         Imaging: No results found  Objective:     Physical Exam  Constitutional:       Appearance: Normal appearance  He is well-developed  He is not ill-appearing  HENT:      Head: Normocephalic and atraumatic  Eyes:      General: No scleral icterus  Conjunctiva/sclera: Conjunctivae normal    Neck:      Musculoskeletal: Normal range of motion and neck supple  Cardiovascular:      Rate and Rhythm: Normal rate and regular rhythm  Heart sounds: Normal heart sounds  Pulmonary:      Effort: Pulmonary effort is normal       Breath sounds: Normal breath sounds  Abdominal:      Palpations: Abdomen is soft  Tenderness: There is no right CVA tenderness or left CVA tenderness  Musculoskeletal: Normal range of motion  Skin:     General: Skin is warm and dry  Neurological:      Mental Status: He is alert and oriented to person, place, and time  Deep Tendon Reflexes: Reflexes are normal and symmetric  Psychiatric:         Behavior: Behavior normal          Thought Content: Thought content normal          Judgment: Judgment normal            There are no Patient Instructions on file for this visit  PITA Harris    Portions of the record may have been created with voice recognition software  Occasional wrong word or "sound a like" substitutions may have occurred due to the inherent limitations of voice recognition software  Read the chart carefully and recognize, using context, where substitutions have occurred

## 2020-09-26 ENCOUNTER — TELEPHONE (OUTPATIENT)
Dept: OTHER | Facility: OTHER | Age: 40
End: 2020-09-26

## 2020-09-26 DIAGNOSIS — I10 ESSENTIAL HYPERTENSION: ICD-10-CM

## 2020-09-26 NOTE — TELEPHONE ENCOUNTER
PT CALLED TO LEAVE MESSAGE FOR THE OFFICE B/C THEY LOST THEIR MEDICATION BOTTLE FILLED WITH THEIR LISINOPRIL MEDICATION  PT WOULD LIKE TO KNOW IF ANOTHER SCRIPT CAN BE SENT OVER TO PHARMACY

## 2020-09-28 RX ORDER — LISINOPRIL 20 MG/1
20 TABLET ORAL DAILY
Qty: 90 TABLET | Refills: 1 | Status: CANCELLED | OUTPATIENT
Start: 2020-09-28

## 2020-09-29 DIAGNOSIS — I10 ESSENTIAL HYPERTENSION: ICD-10-CM

## 2020-09-29 RX ORDER — LISINOPRIL 20 MG/1
20 TABLET ORAL DAILY
Qty: 90 TABLET | Refills: 1 | Status: SHIPPED | OUTPATIENT
Start: 2020-09-29 | End: 2021-09-16

## 2020-09-29 RX ORDER — LISINOPRIL 20 MG/1
20 TABLET ORAL DAILY
Qty: 90 TABLET | Refills: 1 | Status: SHIPPED | OUTPATIENT
Start: 2020-09-29 | End: 2020-09-29 | Stop reason: SDUPTHER

## 2020-09-29 NOTE — TELEPHONE ENCOUNTER
Medication Refill, patient lost his medication and would like another refill  He is aware that he may have to payout of pocket

## 2020-10-31 DIAGNOSIS — I10 ESSENTIAL HYPERTENSION: ICD-10-CM

## 2020-11-02 RX ORDER — LISINOPRIL 10 MG/1
TABLET ORAL
Qty: 90 TABLET | Refills: 0 | Status: SHIPPED | OUTPATIENT
Start: 2020-11-02 | End: 2021-08-30

## 2021-02-16 ENCOUNTER — OFFICE VISIT (OUTPATIENT)
Dept: FAMILY MEDICINE CLINIC | Facility: CLINIC | Age: 41
End: 2021-02-16
Payer: COMMERCIAL

## 2021-02-16 VITALS
WEIGHT: 225.9 LBS | OXYGEN SATURATION: 96 % | TEMPERATURE: 97.3 F | SYSTOLIC BLOOD PRESSURE: 125 MMHG | HEART RATE: 103 BPM | BODY MASS INDEX: 34.24 KG/M2 | RESPIRATION RATE: 18 BRPM | DIASTOLIC BLOOD PRESSURE: 78 MMHG | HEIGHT: 68 IN

## 2021-02-16 DIAGNOSIS — E78.2 MIXED HYPERLIPIDEMIA: ICD-10-CM

## 2021-02-16 DIAGNOSIS — G47.00 INSOMNIA, UNSPECIFIED TYPE: ICD-10-CM

## 2021-02-16 DIAGNOSIS — I10 ESSENTIAL HYPERTENSION: ICD-10-CM

## 2021-02-16 DIAGNOSIS — Z71.6 ENCOUNTER FOR SMOKING CESSATION COUNSELING: Primary | ICD-10-CM

## 2021-02-16 PROCEDURE — 3725F SCREEN DEPRESSION PERFORMED: CPT | Performed by: FAMILY MEDICINE

## 2021-02-16 PROCEDURE — 99214 OFFICE O/P EST MOD 30 MIN: CPT | Performed by: FAMILY MEDICINE

## 2021-02-16 PROCEDURE — 1036F TOBACCO NON-USER: CPT | Performed by: FAMILY MEDICINE

## 2021-02-16 RX ORDER — ROSUVASTATIN CALCIUM 5 MG/1
10 TABLET, COATED ORAL DAILY
COMMUNITY
End: 2021-08-30

## 2021-02-16 RX ORDER — ZOLPIDEM TARTRATE 5 MG/1
5 TABLET ORAL
Qty: 28 TABLET | Refills: 0 | Status: SHIPPED | OUTPATIENT
Start: 2021-02-16 | End: 2021-04-01 | Stop reason: SDUPTHER

## 2021-02-16 RX ORDER — VARENICLINE TARTRATE 25 MG
KIT ORAL
Qty: 53 TABLET | Refills: 0 | Status: SHIPPED | OUTPATIENT
Start: 2021-02-16 | End: 2021-06-03 | Stop reason: SDUPTHER

## 2021-02-16 NOTE — PROGRESS NOTES
BMI Counseling: Body mass index is 34 35 kg/m²  The BMI is above normal  Nutrition recommendations include decreasing portion sizes, decreasing fast food intake, limiting drinks that contain sugar, moderation in carbohydrate intake, increasing intake of lean protein, reducing intake of saturated and trans fat and reducing intake of cholesterol  Exercise recommendations include moderate physical activity 150 minutes/week and exercising 3-5 times per week  No pharmacotherapy was ordered  Depression Screening and Follow-up Plan: Clincally patient does not have depression  No treatment is required  Assessment/Plan:     Chronic Problems:  No problem-specific Assessment & Plan notes found for this encounter  Visit Diagnosis:  Diagnoses and all orders for this visit:    Encounter for smoking cessation counseling  -     varenicline (CHANTIX CAROLE) 0 5 MG X 11 & 1 MG X 42 tablet; Take one 0 5 mg tablet by mouth once daily for 3 days, then one 0 5 mg tablet by mouth twice daily for 4 days, then one 1 mg tablet by mouth twice daily  Insomnia, unspecified type    Essential hypertension    Mixed hyperlipidemia    Other orders  -     rosuvastatin (CRESTOR) 5 mg tablet; Take 10 mg by mouth daily      htn   stable within parameters continue current medications   Discussed goals of therapy main so blood pressure numbers in the 120s over 70s, reviewed medications indications and side effect profiles, discussed dosing, recommended diet modification such as salt and sodium restriction with weight loss program   Recommended regular routine exercise and stretching program   insomnia   stable with current treatments and therapies, Ambien as needed, understands dosing side effects warnings and cautions   smoking cessation   discussed plan of care reviewed options, discussed medications dosing, side effects   set a quit date   discussed length of therapy      Subjective:    Patient ID: Zenia Torres is a 38 y o  male     Smoking cessaiton   1ppd x 15 yr   Nicole Watts tried nothing in the apast    with current issues in regard to pandemic restarted, understands using it more as a stress reduction mechanism but under stands also the necessity of smoking cessation   negative treatments in the past   negative history of COPD asthma   insomnia, has had infrequent use of Ambien but then when required does find very effective in assisting in sleep maintenance/hygiene would like refill has not been used in quite a   understands safety cautions and precautions     blood pressure, continues medicines without missed doses  Negative chest pain palpitations shortness of breath difficulty breathing cough lesions rash    While whittling at home cut left hand , seen  In the er   Puncture wound/ laceration left hand  Late December 2000, seen and evaluated [facility], no treatment required, continues have issue in extending 3rd finger left hand since that time, denies any redness fluctuance swelling to initial site, has scheduled follow-up with hand specialist    Has appt with hand   Spec  md - with [facility] , appt made         The following portions of the patient's history were reviewed and updated as appropriate: allergies, current medications, past family history, past medical history, past social history, past surgical history and problem list     Review of Systems   Constitutional: Negative for appetite change, chills, fever and unexpected weight change  HENT: Negative for congestion, dental problem, ear pain, hearing loss, postnasal drip, rhinorrhea, sinus pressure, sinus pain, sneezing, sore throat, tinnitus and voice change  Eyes: Negative for visual disturbance  Respiratory: Negative for apnea, cough, chest tightness and shortness of breath  Cardiovascular: Negative for chest pain, palpitations and leg swelling     Gastrointestinal: Negative for abdominal pain, blood in stool, constipation, diarrhea, nausea and vomiting  Endocrine: Negative for cold intolerance, heat intolerance, polydipsia, polyphagia and polyuria  Genitourinary: Negative for decreased urine volume, difficulty urinating, dysuria, frequency and hematuria  Musculoskeletal: Negative for arthralgias, back pain, gait problem, joint swelling and myalgias  Skin: Negative for color change, rash and wound  Allergic/Immunologic: Negative for environmental allergies and food allergies  Neurological: Negative for dizziness, syncope, weakness, light-headedness, numbness and headaches  Hematological: Negative for adenopathy  Does not bruise/bleed easily  Psychiatric/Behavioral: Negative for sleep disturbance and suicidal ideas  The patient is not nervous/anxious            /78 (BP Location: Left arm, Patient Position: Sitting, Cuff Size: Adult)   Pulse 103   Temp (!) 97 3 °F (36 3 °C)   Resp 18   Ht 5' 8" (1 727 m)   Wt 102 kg (225 lb 14 4 oz)   SpO2 96%   BMI 34 35 kg/m²   Social History     Socioeconomic History    Marital status: Single     Spouse name: Not on file    Number of children: Not on file    Years of education: Not on file    Highest education level: Not on file   Occupational History    Occupation: employed   Social Needs    Financial resource strain: Not on file    Food insecurity     Worry: Not on file     Inability: Not on file   Edai needs     Medical: Not on file     Non-medical: Not on file   Tobacco Use    Smoking status: Former Smoker    Smokeless tobacco: Never Used   Substance and Sexual Activity    Alcohol use: Yes     Comment: social    Drug use: No    Sexual activity: Not on file   Lifestyle    Physical activity     Days per week: Not on file     Minutes per session: Not on file    Stress: Not on file   Relationships    Social connections     Talks on phone: Not on file     Gets together: Not on file     Attends Scientologist service: Not on file     Active member of club or organization: Not on file     Attends meetings of clubs or organizations: Not on file     Relationship status: Not on file    Intimate partner violence     Fear of current or ex partner: Not on file     Emotionally abused: Not on file     Physically abused: Not on file     Forced sexual activity: Not on file   Other Topics Concern    Not on file   Social History Narrative    Always uses seat belt     Daily caffeinated coffee consumption    Lives with family     Past Medical History:   Diagnosis Date    Atypical chest pain     Hyperlipidemia     last assessed 1/17/17     Family History   Problem Relation Age of Onset    Hypertension Mother     Heart disease Father         cardiac d/o    Diabetes Father     Heart attack Brother      History reviewed  No pertinent surgical history      Current Outpatient Medications:     aspirin (ECOTRIN LOW STRENGTH) 81 mg EC tablet, Take 1 tablet (81 mg total) by mouth daily, Disp: 90 tablet, Rfl: 1    atorvastatin (LIPITOR) 20 mg tablet, Take 1 tablet (20 mg total) by mouth daily at bedtime, Disp: 90 tablet, Rfl: 3    lisinopril (ZESTRIL) 20 mg tablet, Take 1 tablet (20 mg total) by mouth daily, Disp: 90 tablet, Rfl: 1    Omega-3 Fatty Acids (OMEGA 3 500 PO), Take by mouth daily, Disp: , Rfl:     rosuvastatin (CRESTOR) 5 mg tablet, Take 10 mg by mouth daily, Disp: , Rfl:     zolpidem (AMBIEN) 5 mg tablet, Take 1 tablet (5 mg total) by mouth daily at bedtime as needed for sleep, Disp: 28 tablet, Rfl: 0    lisinopril (ZESTRIL) 10 mg tablet, TAKE 1 TABLET BY MOUTH EVERY DAY (Patient not taking: Reported on 2/16/2021), Disp: 90 tablet, Rfl: 0    Multiple Vitamin (MULTIVITAMIN) tablet, Take 1 tablet by mouth daily, Disp: , Rfl:     triamcinolone (KENALOG) 0 1 % cream, Apply topically 2 (two) times a day, Disp: 30 g, Rfl: 0    varenicline (CHANTIX CAROLE) 0 5 MG X 11 & 1 MG X 42 tablet, Take one 0 5 mg tablet by mouth once daily for 3 days, then one 0 5 mg tablet by mouth twice daily for 4 days, then one 1 mg tablet by mouth twice daily  , Disp: 53 tablet, Rfl: 0    No Known Allergies       Lab Review   Appointment on 09/01/2020   Component Date Value    Cholesterol 09/01/2020 175     Triglycerides 09/01/2020 165*    HDL, Direct 09/01/2020 40     LDL Calculated 09/01/2020 102*    WBC 09/01/2020 6 59     RBC 09/01/2020 4 72     Hemoglobin 09/01/2020 13 3     Hematocrit 09/01/2020 43 2     MCV 09/01/2020 92     MCH 09/01/2020 28 2     MCHC 09/01/2020 30 8*    RDW 09/01/2020 11 6     MPV 09/01/2020 10 2     Platelets 46/01/3042 363     nRBC 09/01/2020 0     Neutrophils Relative 09/01/2020 65     Immat GRANS % 09/01/2020 0     Lymphocytes Relative 09/01/2020 26     Monocytes Relative 09/01/2020 5     Eosinophils Relative 09/01/2020 3     Basophils Relative 09/01/2020 1     Neutrophils Absolute 09/01/2020 4 27     Immature Grans Absolute 09/01/2020 0 01     Lymphocytes Absolute 09/01/2020 1 70     Monocytes Absolute 09/01/2020 0 34     Eosinophils Absolute 09/01/2020 0 22     Basophils Absolute 09/01/2020 0 05     TSH 3RD GENERATON 09/01/2020 0 845     Creatinine, Ur 09/01/2020 192 0     Microalbum  ,U,Random 09/01/2020 7 4     Microalb Creat Ratio 09/01/2020 4     Prostate Specific Antige* 09/01/2020 1 0     PSA, Free 09/01/2020 0 20     PSA, Free Pct 09/01/2020 20 0    Office Visit on 08/17/2020   Component Date Value    Color, UA 09/01/2020 Yellow     Clarity, UA 09/01/2020 Clear     Specific Gravity, UA 09/01/2020 1 022     pH, UA 09/01/2020 6 0     Leukocytes, UA 09/01/2020 Negative     Nitrite, UA 09/01/2020 Negative     Protein, UA 09/01/2020 Negative     Glucose, UA 09/01/2020 Negative     Ketones, UA 09/01/2020 Negative     Urobilinogen, UA 09/01/2020 0 2     Bilirubin, UA 09/01/2020 Negative     Blood, UA 09/01/2020 Negative         Imaging: No results found      Objective:     Physical Exam  Constitutional:       General: He is not in acute distress  Appearance: He is well-developed  He is not ill-appearing  HENT:      Head: Normocephalic and atraumatic  Neck:      Musculoskeletal: Normal range of motion and neck supple  Cardiovascular:      Rate and Rhythm: Normal rate and regular rhythm  Heart sounds: Normal heart sounds  Pulmonary:      Effort: Pulmonary effort is normal       Breath sounds: Normal breath sounds  Musculoskeletal: Normal range of motion  Skin:     General: Skin is warm and dry  Neurological:      Mental Status: He is alert and oriented to person, place, and time  Deep Tendon Reflexes: Reflexes are normal and symmetric  Psychiatric:         Behavior: Behavior normal          Thought Content: Thought content normal          Judgment: Judgment normal            There are no Patient Instructions on file for this visit  PITA Crenshaw    Portions of the record may have been created with voice recognition software  Occasional wrong word or "sound a like" substitutions may have occurred due to the inherent limitations of voice recognition software  Read the chart carefully and recognize, using context, where substitutions have occurred

## 2021-03-10 DIAGNOSIS — Z23 ENCOUNTER FOR IMMUNIZATION: ICD-10-CM

## 2021-04-01 ENCOUNTER — OFFICE VISIT (OUTPATIENT)
Dept: FAMILY MEDICINE CLINIC | Facility: CLINIC | Age: 41
End: 2021-04-01
Payer: COMMERCIAL

## 2021-04-01 VITALS
HEART RATE: 85 BPM | RESPIRATION RATE: 19 BRPM | HEIGHT: 68 IN | SYSTOLIC BLOOD PRESSURE: 137 MMHG | TEMPERATURE: 97 F | WEIGHT: 219.8 LBS | DIASTOLIC BLOOD PRESSURE: 76 MMHG | BODY MASS INDEX: 33.31 KG/M2 | OXYGEN SATURATION: 98 %

## 2021-04-01 DIAGNOSIS — I10 ESSENTIAL HYPERTENSION: ICD-10-CM

## 2021-04-01 DIAGNOSIS — G47.00 INSOMNIA, UNSPECIFIED TYPE: ICD-10-CM

## 2021-04-01 DIAGNOSIS — F32.A DEPRESSION, UNSPECIFIED DEPRESSION TYPE: Primary | ICD-10-CM

## 2021-04-01 PROCEDURE — 99214 OFFICE O/P EST MOD 30 MIN: CPT | Performed by: FAMILY MEDICINE

## 2021-04-01 PROCEDURE — 3008F BODY MASS INDEX DOCD: CPT | Performed by: FAMILY MEDICINE

## 2021-04-01 RX ORDER — ZOLPIDEM TARTRATE 5 MG/1
5 TABLET ORAL
Qty: 30 TABLET | Refills: 0 | Status: SHIPPED | OUTPATIENT
Start: 2021-04-01 | End: 2022-07-18 | Stop reason: SDUPTHER

## 2021-04-01 RX ORDER — VENLAFAXINE HYDROCHLORIDE 37.5 MG/1
37.5 CAPSULE, EXTENDED RELEASE ORAL
Qty: 30 CAPSULE | Refills: 3 | Status: SHIPPED | OUTPATIENT
Start: 2021-04-01 | End: 2021-06-21

## 2021-04-01 NOTE — PROGRESS NOTES
Assessment/Plan:     Chronic Problems:  No problem-specific Assessment & Plan notes found for this encounter  Visit Diagnosis:  Diagnoses and all orders for this visit:    Depression, unspecified depression type  -     venlafaxine (EFFEXOR-XR) 37 5 mg 24 hr capsule; Take 1 capsule (37 5 mg total) by mouth daily with breakfast    Essential hypertension    Insomnia, unspecified type  -     zolpidem (AMBIEN) 5 mg tablet; Take 1 tablet (5 mg total) by mouth daily at bedtime as needed for sleep     depression /anxiety   discussed issues concerns at length, discussed alternative options has self-help mechanisms, encourage continued conversation with therapist, discussed medication management, Effexor 37 5 discussed dosing regimen schedule virtual follow-up review side effects adjustments   insomnia   discussed relationship with work situations home stressors reviewed dosing Ambien safety warnings and cautions, reviewed sleep hygiene  htn   stable, continue present medications      Subjective:    Patient ID: Jameel Christine is a 42 y o  male  F/u   Under care of therapist aden low   We both feel it would be beneficial to start on a medication   anxiety, difficulty turning off at the end of the day stressed, sleep issues  Ambien every 3rd day  Could not tolerate the zolfot of yrs ago , secondary to sexual side effects    spouse  Smoking has since quit,   Has returned to exercise , weight training    blood pressure continues, no issues  Negative chest pain palpitations shortness of breath difficulty breathing cough lesions rash          The following portions of the patient's history were reviewed and updated as appropriate: allergies, current medications, past family history, past medical history, past social history, past surgical history and problem list     Review of Systems   Constitutional: Negative for appetite change, chills, fever and unexpected weight change     HENT: Negative for congestion, dental problem, ear pain, hearing loss, postnasal drip, rhinorrhea, sinus pressure, sinus pain, sneezing, sore throat, tinnitus and voice change  Eyes: Negative for visual disturbance  Respiratory: Negative for apnea, cough, chest tightness and shortness of breath  Cardiovascular: Negative for chest pain, palpitations and leg swelling  Gastrointestinal: Negative for abdominal pain, blood in stool, constipation, diarrhea, nausea and vomiting  Endocrine: Negative for cold intolerance, heat intolerance, polydipsia, polyphagia and polyuria  Genitourinary: Negative for decreased urine volume, difficulty urinating, dysuria, frequency and hematuria  Musculoskeletal: Negative for arthralgias, back pain, gait problem, joint swelling and myalgias  Skin: Negative for color change, rash and wound  Allergic/Immunologic: Negative for environmental allergies and food allergies  Neurological: Negative for dizziness, syncope, weakness, light-headedness, numbness and headaches  Hematological: Negative for adenopathy  Does not bruise/bleed easily  Psychiatric/Behavioral: Positive for sleep disturbance  Negative for suicidal ideas  The patient is nervous/anxious            /76 (BP Location: Left arm, Patient Position: Sitting, Cuff Size: Adult)   Pulse 85   Temp (!) 97 °F (36 1 °C)   Resp 19   Ht 5' 8" (1 727 m)   Wt 99 7 kg (219 lb 12 8 oz)   SpO2 98%   BMI 33 42 kg/m²   Social History     Socioeconomic History    Marital status: Single     Spouse name: Not on file    Number of children: Not on file    Years of education: Not on file    Highest education level: Not on file   Occupational History    Occupation: employed   Social Needs    Financial resource strain: Not on file    Food insecurity     Worry: Not on file     Inability: Not on file   Xeneta needs     Medical: Not on file     Non-medical: Not on file   Tobacco Use    Smoking status: Former Smoker    Smokeless tobacco: Never Used   Substance and Sexual Activity    Alcohol use: Yes     Comment: social    Drug use: No    Sexual activity: Not on file   Lifestyle    Physical activity     Days per week: Not on file     Minutes per session: Not on file    Stress: Not on file   Relationships    Social connections     Talks on phone: Not on file     Gets together: Not on file     Attends Scientologist service: Not on file     Active member of club or organization: Not on file     Attends meetings of clubs or organizations: Not on file     Relationship status: Not on file    Intimate partner violence     Fear of current or ex partner: Not on file     Emotionally abused: Not on file     Physically abused: Not on file     Forced sexual activity: Not on file   Other Topics Concern    Not on file   Social History Narrative    Always uses seat belt     Daily caffeinated coffee consumption    Lives with family     Past Medical History:   Diagnosis Date    Atypical chest pain     Hyperlipidemia     last assessed 1/17/17     Family History   Problem Relation Age of Onset    Hypertension Mother     Heart disease Father         cardiac d/o    Diabetes Father     Heart attack Brother      History reviewed  No pertinent surgical history      Current Outpatient Medications:     aspirin (ECOTRIN LOW STRENGTH) 81 mg EC tablet, Take 1 tablet (81 mg total) by mouth daily, Disp: 90 tablet, Rfl: 1    atorvastatin (LIPITOR) 20 mg tablet, Take 1 tablet (20 mg total) by mouth daily at bedtime, Disp: 90 tablet, Rfl: 3    lisinopril (ZESTRIL) 20 mg tablet, Take 1 tablet (20 mg total) by mouth daily, Disp: 90 tablet, Rfl: 1    Multiple Vitamin (MULTIVITAMIN) tablet, Take 1 tablet by mouth daily, Disp: , Rfl:     Omega-3 Fatty Acids (OMEGA 3 500 PO), Take by mouth daily, Disp: , Rfl:     rosuvastatin (CRESTOR) 5 mg tablet, Take 10 mg by mouth daily, Disp: , Rfl:     triamcinolone (KENALOG) 0 1 % cream, Apply topically 2 (two) times a day, Disp: 30 g, Rfl: 0    varenicline (CHANTIX CAROLE) 0 5 MG X 11 & 1 MG X 42 tablet, Take one 0 5 mg tablet by mouth once daily for 3 days, then one 0 5 mg tablet by mouth twice daily for 4 days, then one 1 mg tablet by mouth twice daily  , Disp: 53 tablet, Rfl: 0    zolpidem (AMBIEN) 5 mg tablet, Take 1 tablet (5 mg total) by mouth daily at bedtime as needed for sleep, Disp: 30 tablet, Rfl: 0    lisinopril (ZESTRIL) 10 mg tablet, TAKE 1 TABLET BY MOUTH EVERY DAY (Patient not taking: Reported on 2/16/2021), Disp: 90 tablet, Rfl: 0    venlafaxine (EFFEXOR-XR) 37 5 mg 24 hr capsule, Take 1 capsule (37 5 mg total) by mouth daily with breakfast, Disp: 30 capsule, Rfl: 3    No Known Allergies       Lab Review   No visits with results within 6 Month(s) from this visit  Latest known visit with results is:   Appointment on 09/01/2020   Component Date Value    Cholesterol 09/01/2020 175     Triglycerides 09/01/2020 165*    HDL, Direct 09/01/2020 40     LDL Calculated 09/01/2020 102*    WBC 09/01/2020 6 59     RBC 09/01/2020 4 72     Hemoglobin 09/01/2020 13 3     Hematocrit 09/01/2020 43 2     MCV 09/01/2020 92     MCH 09/01/2020 28 2     MCHC 09/01/2020 30 8*    RDW 09/01/2020 11 6     MPV 09/01/2020 10 2     Platelets 75/87/0932 363     nRBC 09/01/2020 0     Neutrophils Relative 09/01/2020 65     Immat GRANS % 09/01/2020 0     Lymphocytes Relative 09/01/2020 26     Monocytes Relative 09/01/2020 5     Eosinophils Relative 09/01/2020 3     Basophils Relative 09/01/2020 1     Neutrophils Absolute 09/01/2020 4 27     Immature Grans Absolute 09/01/2020 0 01     Lymphocytes Absolute 09/01/2020 1 70     Monocytes Absolute 09/01/2020 0 34     Eosinophils Absolute 09/01/2020 0 22     Basophils Absolute 09/01/2020 0 05     TSH 3RD GENERATON 09/01/2020 0 845     Creatinine, Ur 09/01/2020 192 0     Microalbum  ,U,Random 09/01/2020 7 4     Microalb Creat Ratio 09/01/2020 4     Prostate Specific Antige* 09/01/2020 1 0     PSA, Free 09/01/2020 0 20     PSA, Free Pct 09/01/2020 20 0         Imaging: No results found  Objective:     Physical Exam  Constitutional:       General: He is not in acute distress  Appearance: He is not ill-appearing  Pulmonary:      Effort: Pulmonary effort is normal    Neurological:      Mental Status: He is oriented to person, place, and time  Psychiatric:         Attention and Perception: Attention and perception normal          Mood and Affect: Mood normal          Speech: Speech normal          Behavior: Behavior normal  Behavior is cooperative  Thought Content: Thought content normal  Thought content does not include homicidal or suicidal ideation  Thought content does not include homicidal or suicidal plan  Cognition and Memory: Cognition and memory normal          Judgment: Judgment normal            There are no Patient Instructions on file for this visit  PITA Harris    Portions of the record may have been created with voice recognition software  Occasional wrong word or "sound a like" substitutions may have occurred due to the inherent limitations of voice recognition software  Read the chart carefully and recognize, using context, where substitutions have occurred

## 2021-05-04 ENCOUNTER — TELEMEDICINE (OUTPATIENT)
Dept: FAMILY MEDICINE CLINIC | Facility: CLINIC | Age: 41
End: 2021-05-04
Payer: COMMERCIAL

## 2021-05-04 DIAGNOSIS — F32.A DEPRESSION, UNSPECIFIED DEPRESSION TYPE: Primary | ICD-10-CM

## 2021-05-04 PROCEDURE — 99213 OFFICE O/P EST LOW 20 MIN: CPT | Performed by: FAMILY MEDICINE

## 2021-06-03 DIAGNOSIS — Z71.6 ENCOUNTER FOR SMOKING CESSATION COUNSELING: ICD-10-CM

## 2021-06-03 DIAGNOSIS — Z71.6 ENCOUNTER FOR SMOKING CESSATION COUNSELING: Primary | ICD-10-CM

## 2021-06-03 RX ORDER — VARENICLINE TARTRATE 25 MG
KIT ORAL
Qty: 53 TABLET | Refills: 0 | Status: SHIPPED | OUTPATIENT
Start: 2021-06-03 | End: 2022-02-17 | Stop reason: CLARIF

## 2021-06-03 RX ORDER — VARENICLINE TARTRATE 25 MG
KIT ORAL
Qty: 53 TABLET | Refills: 0 | Status: SHIPPED | OUTPATIENT
Start: 2021-06-03 | End: 2021-07-13

## 2021-06-03 RX ORDER — VARENICLINE TARTRATE 1 MG/1
1 TABLET, FILM COATED ORAL 2 TIMES DAILY
Qty: 60 TABLET | Refills: 2 | Status: SHIPPED | OUTPATIENT
Start: 2021-06-03 | End: 2022-02-17 | Stop reason: CLARIF

## 2021-06-21 DIAGNOSIS — F32.A DEPRESSION, UNSPECIFIED DEPRESSION TYPE: ICD-10-CM

## 2021-06-21 RX ORDER — VENLAFAXINE HYDROCHLORIDE 37.5 MG/1
CAPSULE, EXTENDED RELEASE ORAL
Qty: 90 CAPSULE | Refills: 1 | Status: SHIPPED | OUTPATIENT
Start: 2021-06-21 | End: 2021-08-04 | Stop reason: SDUPTHER

## 2021-07-07 DIAGNOSIS — Z71.6 ENCOUNTER FOR SMOKING CESSATION COUNSELING: ICD-10-CM

## 2021-07-08 ENCOUNTER — TELEMEDICINE (OUTPATIENT)
Dept: FAMILY MEDICINE CLINIC | Facility: CLINIC | Age: 41
End: 2021-07-08
Payer: COMMERCIAL

## 2021-07-08 VITALS — TEMPERATURE: 98 F

## 2021-07-08 DIAGNOSIS — J01.90 ACUTE NON-RECURRENT SINUSITIS, UNSPECIFIED LOCATION: Primary | ICD-10-CM

## 2021-07-08 PROCEDURE — 1036F TOBACCO NON-USER: CPT | Performed by: NURSE PRACTITIONER

## 2021-07-08 PROCEDURE — 99213 OFFICE O/P EST LOW 20 MIN: CPT | Performed by: NURSE PRACTITIONER

## 2021-07-08 RX ORDER — AMOXICILLIN AND CLAVULANATE POTASSIUM 875; 125 MG/1; MG/1
1 TABLET, FILM COATED ORAL EVERY 12 HOURS SCHEDULED
Qty: 14 TABLET | Refills: 0 | Status: SHIPPED | OUTPATIENT
Start: 2021-07-08 | End: 2021-07-15

## 2021-07-08 NOTE — PROGRESS NOTES
Virtual Regular Visit      Assessment/Plan:    Problem List Items Addressed This Visit     None      Visit Diagnoses     Acute non-recurrent sinusitis, unspecified location    -  Primary    Relevant Medications    amoxicillin-clavulanate (Augmentin) 875-125 mg per tablet           Encouraged use of saline nasal spray twice daily  May use over-the-counter decongestant like Sudafed  Encouraged rest and fluids  Discontinue Z-pack  Advised to limit irritants like smoking  Return or call office to office for any worsening/concerning symptoms  Reason for visit is   Chief Complaint   Patient presents with    Virtual Regular Visit        Encounter provider PITA Mart    Provider located at [facility] [address] [place], [place] [id number] [address] [place] [id number] [phone]      Recent Visits  No visits were found meeting these conditions  Showing recent visits within past 7 days and meeting all other requirements  Today's Visits  Date Type Provider Dept   [facility] [address] [facility]   Showing today's visits and meeting all other requirements  Future Appointments  No visits were found meeting these conditions  Showing future appointments within next 150 days and meeting all other requirements       The patient was identified by name and date of birth  Elizabeth Monalisa was informed that this is a telemedicine visit and that the visit is being conducted through [address] [place] [address] Now and patient was informed that this is a secure, HIPAA-compliant platform  He agrees to proceed     My office door was closed  No one else was in the room  He acknowledged consent and understanding of privacy and security of the video platform  The patient has agreed to participate and understands they can discontinue the visit at any time  Patient is aware this is a billable service  Subjective  Elizabeth Sheldon is a 42 y o  male         Patient presents via [address] [place] [address] for congestion, frontal headache, sore throat, and dry cough for over a week  Patient notes pain with swallowing as well  Denies SOB, CP, palpitations  Denies fever or chills  Denies n/v/d  Denies recent sick contacts  Patient did receive Covid vaccine  + tobacco use, but notes he quit once he became ill  Patient was at Urgent Care 2 days ago, was diagnosed with pharyngitis and conjunctivitis, given oxfloxacin and Z-pack  Is on day 3 of 5 for Z-pack  Notes symptoms have gotten worse  Sinusitis  This is a new problem  The current episode started 1 to 4 weeks ago  The problem has been gradually worsening since onset  There has been no fever  He is experiencing no pain  Associated symptoms include congestion, coughing, ear pain, headaches, sinus pressure and a sore throat  Pertinent negatives include no chills, diaphoresis, neck pain, shortness of breath or sneezing  Past treatments include antibiotics  The treatment provided no relief  Past Medical History:   Diagnosis Date    Atypical chest pain     Hyperlipidemia     last assessed 1/17/17       No past surgical history on file      Current Outpatient Medications   Medication Sig Dispense Refill    amoxicillin-clavulanate (Augmentin) 875-125 mg per tablet Take 1 tablet by mouth every 12 (twelve) hours for 7 days 14 tablet 0    aspirin (ECOTRIN LOW STRENGTH) 81 mg EC tablet Take 1 tablet (81 mg total) by mouth daily 90 tablet 1    atorvastatin (LIPITOR) 20 mg tablet Take 1 tablet (20 mg total) by mouth daily at bedtime 90 tablet 3    lisinopril (ZESTRIL) 10 mg tablet TAKE 1 TABLET BY MOUTH EVERY DAY (Patient not taking: Reported on 2/16/2021) 90 tablet 0    lisinopril (ZESTRIL) 20 mg tablet Take 1 tablet (20 mg total) by mouth daily 90 tablet 1    Multiple Vitamin (MULTIVITAMIN) tablet Take 1 tablet by mouth daily      Omega-3 Fatty Acids (OMEGA 3 500 PO) Take by mouth daily      rosuvastatin (CRESTOR) 5 mg tablet Take 10 mg by mouth daily      triamcinolone (KENALOG) 0 1 % cream Apply topically 2 (two) times a day 30 g 0    varenicline (CHANTIX CAROLE) 0 5 MG X 11 & 1 MG X 42 tablet Take one 0 5 mg tablet by mouth once daily for 3 days, then one 0 5 mg tablet by mouth twice daily for 4 days, then one 1 mg tablet by mouth twice daily  53 tablet 0    varenicline (CHANTIX CAROLE) 0 5 MG X 11 & 1 MG X 42 tablet Take one 0 5 mg tablet by mouth once daily for 3 days, then one 0 5 mg tablet by mouth twice daily for 4 days, then one 1 mg tablet by mouth twice daily  53 tablet 0    varenicline (CHANTIX) 1 mg tablet Take 1 tablet (1 mg total) by mouth 2 (two) times a day 60 tablet 2    venlafaxine (EFFEXOR-XR) 37 5 mg 24 hr capsule TAKE 1 CAPSULE BY MOUTH DAILY WITH BREAKFAST  90 capsule 1    zolpidem (AMBIEN) 5 mg tablet Take 1 tablet (5 mg total) by mouth daily at bedtime as needed for sleep 30 tablet 0     No current facility-administered medications for this visit  No Known Allergies    Review of Systems   Constitutional: Negative for chills, diaphoresis and fever  HENT: Positive for congestion, ear pain, postnasal drip, rhinorrhea, sinus pressure, sinus pain and sore throat  Negative for sneezing and trouble swallowing  Eyes: Negative for pain  Respiratory: Positive for cough  Negative for shortness of breath  Cardiovascular: Negative for chest pain and palpitations  Gastrointestinal: Negative for abdominal pain, nausea and vomiting  Musculoskeletal: Negative for neck pain  Skin: Negative for rash  Neurological: Positive for headaches  Negative for dizziness, weakness and numbness  Video Exam    Vitals:    07/08/21 1202   Temp: 98 °F (36 7 °C)   TempSrc: Temporal       Physical Exam  Constitutional:       General: He is not in acute distress  Appearance: Normal appearance  He is not ill-appearing  HENT:      Head: Normocephalic and atraumatic        Right Ear: External ear normal       Left Ear: External ear normal       Nose: Nose normal    Pulmonary:      Effort: Pulmonary effort is normal  No respiratory distress  Musculoskeletal:         General: Normal range of motion  Cervical back: Normal range of motion  Skin:     Coloration: Skin is not pale  Neurological:      Mental Status: He is alert and oriented to person, place, and time  Psychiatric:         Mood and Affect: Mood normal          Behavior: Behavior normal           I spent 10 minutes directly with the patient during this visit      VIRTUAL VISIT DISCLAIMER    Cris Danielson acknowledges that he has consented to an online visit or consultation  He understands that the online visit is based solely on information provided by him, and that, in the absence of a face-to-face physical evaluation by the physician, the diagnosis he receives is both limited and provisional in terms of accuracy and completeness  This is not intended to replace a full medical face-to-face evaluation by the physician  Cris Danielson understands and accepts these terms

## 2021-07-08 NOTE — PATIENT INSTRUCTIONS
Sinusitis   AMBULATORY CARE:   Sinusitis  is inflammation or infection of your sinuses  It is most often caused by a virus  Acute sinusitis may last up to 12 weeks  Chronic sinusitis lasts longer than 12 weeks  Recurrent sinusitis means you have 4 or more times in 1 year  Common symptoms include the following:   · Fever    · Pain, pressure, redness, or swelling around the forehead, cheeks, or eyes    · Thick yellow or green discharge from your nose    · Tenderness when you touch your face over your sinuses    · Dry cough that happens mostly at night or when you lie down    · Headache and face pain that is worse when you lean forward    · Tooth pain, or pain when you chew    Seek care immediately if:   · Your eye and eyelid are red, swollen, and painful  · You cannot open your eye  · You have vision changes, such as double vision  · Your eyeball bulges out or you cannot move your eye  · You are more sleepy than normal, or you notice changes in your ability to think, move, or talk  · You have a stiff neck, a fever, or a bad headache  · You have swelling of your forehead or scalp  Contact your healthcare provider if:   · Your symptoms do not improve after 3 days  · Your symptoms do not go away after 10 days  · You have nausea and are vomiting  · Your nose is bleeding  · You have questions or concerns about your condition or care  Treatment for sinusitis:  Your symptoms may go away on their own  Your healthcare provider may recommend watchful waiting for up to 10 days before starting antibiotics  You may  need any of the following:  · Acetaminophen  decreases pain and fever  It is available without a doctor's order  Ask how much to take and how often to take it  Follow directions  Read the labels of all other medicines you are using to see if they also contain acetaminophen, or ask your doctor or pharmacist  Acetaminophen can cause liver damage if not taken correctly   Do not use more than 4 grams (4,000 milligrams) total of acetaminophen in one day  · NSAIDs , such as ibuprofen, help decrease swelling, pain, and fever  This medicine is available with or without a doctor's order  NSAIDs can cause stomach bleeding or kidney problems in certain people  If you take blood thinner medicine, always ask your healthcare provider if NSAIDs are safe for you  Always read the medicine label and follow directions  · Nasal steroid sprays  may help decrease inflammation in your nose and sinuses  · Decongestants  help reduce swelling and drain mucus in the nose and sinuses  They may help you breathe easier  · Antihistamines  help dry mucus in the nose and relieve sneezing  · Antibiotics  help treat or prevent a bacterial infection  · Take your medicine as directed  Contact your healthcare provider if you think your medicine is not helping or if you have side effects  Tell him or her if you are allergic to any medicine  Keep a list of the medicines, vitamins, and herbs you take  Include the amounts, and when and why you take them  Bring the list or the pill bottles to follow-up visits  Carry your medicine list with you in case of an emergency  Self-care:   · Rinse your sinuses  Use a sinus rinse device to rinse your nasal passages with a saline (salt water) solution or distilled water  Do not use tap water  This will help thin the mucus in your nose and rinse away pollen and dirt  It will also help reduce swelling so you can breathe normally  Ask your healthcare provider how often to do this  · Breathe in steam   Heat a bowl of water until you see steam  Lean over the bowl and make a tent over your head with a large towel  Breathe deeply for about 20 minutes  Be careful not to get too close to the steam or burn yourself  Do this 3 times a day  You can also breathe deeply when you take a hot shower  · Sleep with your head elevated    Place an extra pillow under your head before you go to sleep to help your sinuses drain  · Drink liquids as directed  Ask your healthcare provider how much liquid to drink each day and which liquids are best for you  Liquids will thin the mucus in your nose and help it drain  Avoid drinks that contain alcohol or caffeine  · Do not smoke, and avoid secondhand smoke  Nicotine and other chemicals in cigarettes and cigars can make your symptoms worse  Ask your healthcare provider for information if you currently smoke and need help to quit  E-cigarettes or smokeless tobacco still contain nicotine  Talk to your healthcare provider before you use these products  Prevent the spread of germs that cause sinusitis:  Wash your hands often with soap and water  Wash your hands after you use the bathroom, change a child's diaper, or sneeze  Wash your hands before you prepare or eat food  Follow up with your healthcare provider as directed: You may be referred to an ear, nose, and throat specialist  Write down your questions so you remember to ask them during your visits  © Copyright [address] Information is for End User's use only and may not be sold, redistributed or otherwise used for commercial purposes  All illustrations and images included in CareNotes® are the copyrighted property of [employer]  or [facility] Isela Arce   The above information is an  only  It is not intended as medical advice for individual conditions or treatments  Talk to your doctor, nurse or pharmacist before following any medical regimen to see if it is safe and effective for you

## 2021-07-13 RX ORDER — VARENICLINE TARTRATE
KIT
Qty: 53 TABLET | Refills: 0 | Status: SHIPPED | OUTPATIENT
Start: 2021-07-13 | End: 2022-02-17 | Stop reason: CLARIF

## 2021-07-16 DIAGNOSIS — F32.A DEPRESSION, UNSPECIFIED DEPRESSION TYPE: ICD-10-CM

## 2021-08-04 ENCOUNTER — OFFICE VISIT (OUTPATIENT)
Dept: FAMILY MEDICINE CLINIC | Facility: CLINIC | Age: 41
End: 2021-08-04
Payer: COMMERCIAL

## 2021-08-04 VITALS
RESPIRATION RATE: 17 BRPM | WEIGHT: 225 LBS | HEIGHT: 68 IN | HEART RATE: 70 BPM | OXYGEN SATURATION: 97 % | SYSTOLIC BLOOD PRESSURE: 117 MMHG | BODY MASS INDEX: 34.1 KG/M2 | DIASTOLIC BLOOD PRESSURE: 73 MMHG

## 2021-08-04 DIAGNOSIS — Z71.6 ENCOUNTER FOR SMOKING CESSATION COUNSELING: Primary | ICD-10-CM

## 2021-08-04 DIAGNOSIS — F32.A DEPRESSION, UNSPECIFIED DEPRESSION TYPE: ICD-10-CM

## 2021-08-04 DIAGNOSIS — E78.2 MIXED HYPERLIPIDEMIA: ICD-10-CM

## 2021-08-04 DIAGNOSIS — I10 ESSENTIAL HYPERTENSION: ICD-10-CM

## 2021-08-04 PROCEDURE — 3078F DIAST BP <80 MM HG: CPT | Performed by: FAMILY MEDICINE

## 2021-08-04 PROCEDURE — 3008F BODY MASS INDEX DOCD: CPT | Performed by: FAMILY MEDICINE

## 2021-08-04 PROCEDURE — 99214 OFFICE O/P EST MOD 30 MIN: CPT | Performed by: FAMILY MEDICINE

## 2021-08-04 PROCEDURE — 3725F SCREEN DEPRESSION PERFORMED: CPT | Performed by: FAMILY MEDICINE

## 2021-08-04 PROCEDURE — 3074F SYST BP LT 130 MM HG: CPT | Performed by: FAMILY MEDICINE

## 2021-08-04 PROCEDURE — 1036F TOBACCO NON-USER: CPT | Performed by: FAMILY MEDICINE

## 2021-08-04 RX ORDER — NICOTINE 21 MG/24HR
1 PATCH, TRANSDERMAL 24 HOURS TRANSDERMAL EVERY 24 HOURS
Qty: 28 PATCH | Refills: 0 | Status: SHIPPED | OUTPATIENT
Start: 2021-08-04 | End: 2022-02-17 | Stop reason: ALTCHOICE

## 2021-08-04 RX ORDER — VENLAFAXINE HYDROCHLORIDE 37.5 MG/1
37.5 CAPSULE, EXTENDED RELEASE ORAL DAILY
Qty: 90 CAPSULE | Refills: 1 | Status: SHIPPED | OUTPATIENT
Start: 2021-08-04 | End: 2022-02-17 | Stop reason: CLARIF

## 2021-08-04 NOTE — ASSESSMENT & PLAN NOTE
Stable, discussed issues concerns will continue current medications Effexor 37 5 milligrams p o  q day counseling therapist OS encouraged

## 2021-08-04 NOTE — PROGRESS NOTES
BMI Counseling: Body mass index is 34 21 kg/m²  The BMI is above normal  Nutrition recommendations include decreasing portion sizes, decreasing fast food intake, limiting drinks that contain sugar, moderation in carbohydrate intake, increasing intake of lean protein, reducing intake of saturated and trans fat and reducing intake of cholesterol  Exercise recommendations include moderate physical activity 150 minutes/week and exercising 3-5 times per week  Depression Screening and Follow-up Plan: Clincally patient does not have depression  No treatment is required  Assessment/Plan:     Chronic Problems:  Essential hypertension  Stable, within parameters, continue current medications    Mixed hyperlipidemia   Stable, reviewed previous lipid profiles tolerating statin to be continued    Depression   Stable, discussed issues concerns will continue current medications Effexor 37 5 milligrams p o  q day counseling therapist OS encouraged      Visit Diagnosis:  Diagnoses and all orders for this visit:    Encounter for smoking cessation counseling  -     nicotine (NICODERM CQ) 14 mg/24hr TD 24 hr patch; Place 1 patch on the skin every 24 hours    Mixed hyperlipidemia  -     Comprehensive metabolic panel; Future  -     Lipid panel; Future  -     TSH, 3rd generation; Future    Essential hypertension  -     Comprehensive metabolic panel; Future  -     Lipid panel; Future  -     TSH, 3rd generation; Future    Depression, unspecified depression type  -     venlafaxine (EFFEXOR-XR) 37 5 mg 24 hr capsule; Take 1 capsule (37 5 mg total) by mouth daily          Subjective:    Patient ID: Michael Walsh is a 42 y o  male  Depression   Has restarted med , did self dc , feeling   Med presently effexor   Smoking , dd do well with the chantix but now with rrecall a bit nervous , other option?   1 ppd x 20   Did stop for about 1 month with chantix , home stress , wife smokes         The following portions of the patient's history were reviewed and updated as appropriate: allergies, current medications, past family history, past medical history, past social history, past surgical history and problem list     Review of Systems   Constitutional: Negative for appetite change, chills, fever and unexpected weight change  HENT: Negative for congestion, dental problem, ear pain, hearing loss, postnasal drip, rhinorrhea, sinus pressure, sinus pain, sneezing, sore throat, tinnitus and voice change  Eyes: Negative for visual disturbance  Respiratory: Negative for apnea, cough, chest tightness and shortness of breath  Cardiovascular: Negative for chest pain, palpitations and leg swelling  Gastrointestinal: Negative for abdominal pain, blood in stool, constipation, diarrhea, nausea and vomiting  Endocrine: Negative for cold intolerance, heat intolerance, polydipsia, polyphagia and polyuria  Genitourinary: Negative for decreased urine volume, difficulty urinating, dysuria, frequency and hematuria  Musculoskeletal: Negative for arthralgias, back pain, gait problem, joint swelling and myalgias  Skin: Negative for color change, rash and wound  Allergic/Immunologic: Negative for environmental allergies and food allergies  Neurological: Negative for dizziness, syncope, weakness, light-headedness, numbness and headaches  Hematological: Negative for adenopathy  Does not bruise/bleed easily  Psychiatric/Behavioral: Negative for sleep disturbance and suicidal ideas  The patient is not nervous/anxious            /73   Pulse 70   Resp 17   Ht 5' 8" (1 727 m)   Wt 102 kg (225 lb)   SpO2 97%   BMI 34 21 kg/m²   Social History     Socioeconomic History    Marital status: Single     Spouse name: Not on file    Number of children: Not on file    Years of education: Not on file    Highest education level: Not on file   Occupational History    Occupation: employed   Tobacco Use    Smoking status: Former Smoker    Smokeless tobacco: Never Used   Substance and Sexual Activity    Alcohol use: Yes     Comment: social    Drug use: No    Sexual activity: Not on file   Other Topics Concern    Not on file   Social History Narrative    Always uses seat belt     Daily caffeinated coffee consumption    Lives with family     Social Determinants of Health     Financial Resource Strain:     Difficulty of Paying Living Expenses:    Food Insecurity:     Worried About Running Out of Food in the Last Year:     Ran Out of Food in the Last Year:    Transportation Needs:     Lack of Transportation (Medical):  Lack of Transportation (Non-Medical):    Physical Activity:     Days of Exercise per Week:     Minutes of Exercise per Session:    Stress:     Feeling of Stress :    Social Connections:     Frequency of Communication with Friends and Family:     Frequency of Social Gatherings with Friends and Family:     Attends Mu-ism Services:     Active Member of Clubs or Organizations:     Attends Club or Organization Meetings:     Marital Status:    Intimate Partner Violence:     Fear of Current or Ex-Partner:     Emotionally Abused:     Physically Abused:     Sexually Abused:      Past Medical History:   Diagnosis Date    Atypical chest pain     Hyperlipidemia     last assessed 1/17/17     Family History   Problem Relation Age of Onset    Hypertension Mother     Heart disease Father         cardiac d/o    Diabetes Father     Heart attack Brother      History reviewed  No pertinent surgical history      Current Outpatient Medications:     aspirin (ECOTRIN LOW STRENGTH) 81 mg EC tablet, Take 1 tablet (81 mg total) by mouth daily, Disp: 90 tablet, Rfl: 1    atorvastatin (LIPITOR) 20 mg tablet, Take 1 tablet (20 mg total) by mouth daily at bedtime, Disp: 90 tablet, Rfl: 3    lisinopril (ZESTRIL) 20 mg tablet, Take 1 tablet (20 mg total) by mouth daily, Disp: 90 tablet, Rfl: 1    Multiple Vitamin (MULTIVITAMIN) tablet, Take 1 tablet by mouth daily, Disp: , Rfl:     Omega-3 Fatty Acids (OMEGA 3 500 PO), Take by mouth daily, Disp: , Rfl:     rosuvastatin (CRESTOR) 5 mg tablet, Take 10 mg by mouth daily, Disp: , Rfl:     venlafaxine (EFFEXOR-XR) 37 5 mg 24 hr capsule, Take 1 capsule (37 5 mg total) by mouth daily, Disp: 90 capsule, Rfl: 1    zolpidem (AMBIEN) 5 mg tablet, Take 1 tablet (5 mg total) by mouth daily at bedtime as needed for sleep, Disp: 30 tablet, Rfl: 0    lisinopril (ZESTRIL) 10 mg tablet, TAKE 1 TABLET BY MOUTH EVERY DAY (Patient not taking: Reported on 2/16/2021), Disp: 90 tablet, Rfl: 0    nicotine (NICODERM CQ) 14 mg/24hr TD 24 hr patch, Place 1 patch on the skin every 24 hours, Disp: 28 patch, Rfl: 0    triamcinolone (KENALOG) 0 1 % cream, Apply topically 2 (two) times a day, Disp: 30 g, Rfl: 0    varenicline (CHANTIX CAROLE) 0 5 MG X 11 & 1 MG X 42 tablet, Take one 0 5 mg tablet by mouth once daily for 3 days, then one 0 5 mg tablet by mouth twice daily for 4 days, then one 1 mg tablet by mouth twice daily  (Patient not taking: Reported on 8/4/2021), Disp: 53 tablet, Rfl: 0    varenicline (Chantix Starting Month Carole) 0 5 MG X 11 & 1 MG X 42 tablet, TAKE ONE 0 5 MG TABLET BY MOUTH ONCE DAILY FOR 3 DAYS, THEN ONE 0 5 MG TABLET BY MOUTH TWICE DAILY FOR 4 DAYS, THEN ONE 1 MG TABLET BY MOUTH TWICE DAILY  , Disp: 53 tablet, Rfl: 0    varenicline (CHANTIX) 1 mg tablet, Take 1 tablet (1 mg total) by mouth 2 (two) times a day, Disp: 60 tablet, Rfl: 2    No Known Allergies       Lab Review   No visits with results within 6 Month(s) from this visit     Latest known visit with results is:   Appointment on 09/01/2020   Component Date Value    Cholesterol 09/01/2020 175     Triglycerides 09/01/2020 165*    HDL, Direct 09/01/2020 40     LDL Calculated 09/01/2020 102*    WBC 09/01/2020 6 59     RBC 09/01/2020 4 72     Hemoglobin 09/01/2020 13 3     Hematocrit 09/01/2020 43 2     MCV 09/01/2020 92     MCH 09/01/2020 28 2     MCHC 09/01/2020 30 8*    RDW 09/01/2020 11 6     MPV 09/01/2020 10 2     Platelets 73/72/5757 363     nRBC 09/01/2020 0     Neutrophils Relative 09/01/2020 65     Immat GRANS % 09/01/2020 0     Lymphocytes Relative 09/01/2020 26     Monocytes Relative 09/01/2020 5     Eosinophils Relative 09/01/2020 3     Basophils Relative 09/01/2020 1     Neutrophils Absolute 09/01/2020 4 27     Immature Grans Absolute 09/01/2020 0 01     Lymphocytes Absolute 09/01/2020 1 70     Monocytes Absolute 09/01/2020 0 34     Eosinophils Absolute 09/01/2020 0 22     Basophils Absolute 09/01/2020 0 05     TSH 3RD GENERATON 09/01/2020 0 845     Creatinine, Ur 09/01/2020 192 0     Microalbum  ,U,Random 09/01/2020 7 4     Microalb Creat Ratio 09/01/2020 4     Prostate Specific Antige* 09/01/2020 1 0     PSA, Free 09/01/2020 0 20     PSA, Free Pct 09/01/2020 20 0         Imaging: No results found  Objective:     Physical Exam  Constitutional:       Appearance: He is well-developed  HENT:      Head: Normocephalic and atraumatic  Cardiovascular:      Rate and Rhythm: Normal rate and regular rhythm  Heart sounds: Normal heart sounds  Pulmonary:      Effort: Pulmonary effort is normal       Breath sounds: Normal breath sounds  Musculoskeletal:         General: Normal range of motion  Cervical back: Normal range of motion and neck supple  Skin:     General: Skin is warm and dry  Neurological:      Mental Status: He is alert and oriented to person, place, and time  Deep Tendon Reflexes: Reflexes are normal and symmetric  Psychiatric:         Behavior: Behavior normal          Thought Content: Thought content normal          Judgment: Judgment normal            There are no Patient Instructions on file for this visit  PITA Santiago    Portions of the record may have been created with voice recognition software    Occasional wrong word or "sound a like" substitutions may have occurred due to the inherent limitations of voice recognition software  Read the chart carefully and recognize, using context, where substitutions have occurred

## 2021-08-29 DIAGNOSIS — E78.2 MIXED HYPERLIPIDEMIA: ICD-10-CM

## 2021-08-30 RX ORDER — ATORVASTATIN CALCIUM 20 MG/1
TABLET, FILM COATED ORAL
Qty: 90 TABLET | Refills: 3 | Status: SHIPPED | OUTPATIENT
Start: 2021-08-30

## 2021-09-16 DIAGNOSIS — I10 ESSENTIAL HYPERTENSION: ICD-10-CM

## 2021-09-16 RX ORDER — LISINOPRIL 20 MG/1
TABLET ORAL
Qty: 90 TABLET | Refills: 1 | Status: SHIPPED | OUTPATIENT
Start: 2021-09-16 | End: 2022-03-22

## 2021-10-18 ENCOUNTER — OFFICE VISIT (OUTPATIENT)
Dept: FAMILY MEDICINE CLINIC | Facility: CLINIC | Age: 41
End: 2021-10-18
Payer: COMMERCIAL

## 2021-10-18 VITALS
BODY MASS INDEX: 33.65 KG/M2 | HEART RATE: 97 BPM | SYSTOLIC BLOOD PRESSURE: 130 MMHG | DIASTOLIC BLOOD PRESSURE: 80 MMHG | HEIGHT: 68 IN | TEMPERATURE: 97.6 F | WEIGHT: 222 LBS | OXYGEN SATURATION: 97 %

## 2021-10-18 DIAGNOSIS — F32.A DEPRESSION, UNSPECIFIED DEPRESSION TYPE: ICD-10-CM

## 2021-10-18 DIAGNOSIS — F41.9 ANXIETY: Primary | ICD-10-CM

## 2021-10-18 DIAGNOSIS — L30.2 ID REACTION: ICD-10-CM

## 2021-10-18 PROCEDURE — 1036F TOBACCO NON-USER: CPT | Performed by: FAMILY MEDICINE

## 2021-10-18 PROCEDURE — 99214 OFFICE O/P EST MOD 30 MIN: CPT | Performed by: FAMILY MEDICINE

## 2021-10-18 PROCEDURE — 3008F BODY MASS INDEX DOCD: CPT | Performed by: FAMILY MEDICINE

## 2021-10-18 RX ORDER — BUSPIRONE HYDROCHLORIDE 7.5 MG/1
7.5 TABLET ORAL 2 TIMES DAILY
Qty: 60 TABLET | Refills: 5 | Status: SHIPPED | OUTPATIENT
Start: 2021-10-18

## 2021-10-18 RX ORDER — TRIAMCINOLONE ACETONIDE 1 MG/G
CREAM TOPICAL 2 TIMES DAILY
Qty: 30 G | Refills: 0 | Status: SHIPPED | OUTPATIENT
Start: 2021-10-18

## 2022-01-09 ENCOUNTER — OFFICE VISIT (OUTPATIENT)
Dept: URGENT CARE | Facility: CLINIC | Age: 42
End: 2022-01-09
Payer: COMMERCIAL

## 2022-01-09 VITALS — BODY MASS INDEX: 34.1 KG/M2 | HEIGHT: 68 IN | WEIGHT: 225 LBS

## 2022-01-09 DIAGNOSIS — R05.9 COUGH: ICD-10-CM

## 2022-01-09 DIAGNOSIS — J02.9 SORE THROAT: Primary | ICD-10-CM

## 2022-01-09 DIAGNOSIS — R09.81 NASAL CONGESTION: ICD-10-CM

## 2022-01-09 PROCEDURE — 99213 OFFICE O/P EST LOW 20 MIN: CPT | Performed by: EMERGENCY MEDICINE

## 2022-01-09 PROCEDURE — U0003 INFECTIOUS AGENT DETECTION BY NUCLEIC ACID (DNA OR RNA); SEVERE ACUTE RESPIRATORY SYNDROME CORONAVIRUS 2 (SARS-COV-2) (CORONAVIRUS DISEASE [COVID-19]), AMPLIFIED PROBE TECHNIQUE, MAKING USE OF HIGH THROUGHPUT TECHNOLOGIES AS DESCRIBED BY CMS-2020-01-R: HCPCS | Performed by: EMERGENCY MEDICINE

## 2022-01-09 PROCEDURE — U0005 INFEC AGEN DETEC AMPLI PROBE: HCPCS | Performed by: EMERGENCY MEDICINE

## 2022-01-09 RX ORDER — BENZONATATE 100 MG/1
100 CAPSULE ORAL 3 TIMES DAILY PRN
Qty: 30 CAPSULE | Refills: 0 | Status: SHIPPED | OUTPATIENT
Start: 2022-01-09 | End: 2022-02-17 | Stop reason: CLARIF

## 2022-01-09 NOTE — PROGRESS NOTES
[facility]        NAME: Xiomara Snyder is a 42 y o  male  : 1980    MRN: [id number]  DATE: 2022  TIME: 12:27 PM    Assessment and Plan   Sore throat [J02 9]  1  Sore throat  COVID Only -Office Collect   2  Cough  COVID Only -Office Collect    benzonatate (TESSALON PERLES) 100 mg capsule   3  Nasal congestion           Patient Instructions     Patient Instructions       Hydration and rest   Check MY CHART in 5 daysfor Covid results  Home isolation until results come back; if + quarantine 5 days from the onset of symptoms, and fever free for 24 hrs  Wear your mask and wash hands often  PCP follow up in 3-5 days; call them first  Go to an emergency department if difficulty breathing occurs  Recommended supplements for potential COVID-19 is the following: Vitamin D3 2000 IU  daily ,  Vitamin C 1g  every 12 hours , Multivitamin Daily       COVID-19 Home Care Guidelines    Your healthcare provider and/or public health staff have evaluated you and have determined that you do not need to remain in the hospital at this time  At this time you can be isolated at home where you will be monitored by staff from your local or state health department  You should carefully follow the prevention and isolation steps below until a healthcare provider or local or state health department says that you can return to your normal activities  Stay home except to get medical care    People who are mildly ill with COVID-19 are able to isolate at home during their illness  You should restrict activities outside your home, except for getting medical care  Do not go to work, school, or public areas  Avoid using public transportation, ride-sharing, or taxis  Separate yourself from other people and animals in your home    People: As much as possible, you should stay in a specific room and away from other people in your home  Also, you should use a separate bathroom, if available  Animals:  You should restrict contact with pets and other animals while you are sick with COVID-19, just like you would around other people  Although there have not been reports of pets or other animals becoming sick with COVID-19, it is still recommended that people sick with COVID-19 limit contact with animals until more information is known about the virus  When possible, have another member of your household care for your animals while you are sick  If you are sick with COVID-19, avoid contact with your pet, including petting, snuggling, being kissed or licked, and sharing food  If you must care for your pet or be around animals while you are sick, wash your hands before and after you interact with pets and wear a facemask  See COVID-19 and Animals for more information  Call ahead before visiting your doctor    If you have a medical appointment, call the healthcare provider and tell them that you have or may have COVID-19  This will help the healthcare providers office take steps to keep other people from getting infected or exposed  Wear a facemask    You should wear a facemask when you are around other people (e g , sharing a room or vehicle) or pets and before you enter a healthcare providers office  If you are not able to wear a facemask (for example, because it causes trouble breathing), then people who live with you should not stay in the same room with you, or they should wear a facemask if they enter your room  Cover your coughs and sneezes    Cover your mouth and nose with a tissue when you cough or sneeze  Throw used tissues in a lined trash can  Immediately wash your hands with soap and water for at least 20 seconds or, if soap and water are not available, clean your hands with an alcohol-based hand  that contains at least 60% alcohol      Clean your hands often    Wash your hands often with soap and water for at least 20 seconds, especially after blowing your nose, coughing, or sneezing; going to the bathroom; and before eating or preparing food  If soap and water are not readily available, use an alcohol-based hand  with at least 60% alcohol, covering all surfaces of your hands and rubbing them together until they feel dry  Soap and water are the best option if hands are visibly dirty  Avoid touching your eyes, nose, and mouth with unwashed hands  Avoid sharing personal household items    You should not share dishes, drinking glasses, cups, eating utensils, towels, or bedding with other people or pets in your home  After using these items, they should be washed thoroughly with soap and water  Clean all high-touch surfaces everyday    High touch surfaces include counters, tabletops, doorknobs, bathroom fixtures, toilets, phones, keyboards, tablets, and bedside tables  Also, clean any surfaces that may have blood, stool, or body fluids on them  Use a household cleaning spray or wipe, according to the label instructions  Labels contain instructions for safe and effective use of the cleaning product including precautions you should take when applying the product, such as wearing gloves and making sure you have good ventilation during use of the product  Monitor your symptoms    Seek prompt medical attention if your illness is worsening (e g , difficulty breathing)  Before seeking care, call your healthcare provider and tell them that you have, or are being evaluated for, COVID-19  Put on a facemask before you enter the facility  These steps will help the healthcare providers office to keep other people in the office or waiting room from getting infected or exposed  Ask your healthcare provider to call the local or state health department  Persons who are placed under active monitoring or facilitated self-monitoring should follow instructions provided by their local health department or occupational health professionals, as appropriate    If you have a medical emergency and need to call 911, notify the dispatch personnel that you have, or are being evaluated for COVID-19  If possible, put on a facemask before emergency medical services arrive  Discontinuing home isolation    Patients with confirmed COVID-19 should remain under home isolation precautions until the following conditions are met:   - They have had no fever for at least 24 hours (that is one full day of no fever without the use medicine that reduces fevers)  AND  - other symptoms have improved (for example, when their cough or shortness of breath have improved)  AND  - If had mild or moderate illness, at least 10 days have passed since their symptoms first appeared or if severe illness (needed oxygen) or immunosuppressed, at least 20 days have passed since symptoms first appeared  Patients with confirmed COVID-19 should also notify close contacts (including their workplace) and ask that they self-quarantine  Currently, close contact is defined as being within 6 feet for 15 minutes or more from the period 24 hours starting 48 hours before symptom onset to the time at which the patient went into isolation  Close contacts of patients diagnosed with COVID-19 should be instructed by the patient to self-quarantine for 14 days from the last time of their last contact with the patient  [facility]           Follow up with PCP in 3-5 days  Proceed to  ER if symptoms worsen  Chief Complaint     Chief Complaint   Patient presents with    Sore Throat     2 days ago, mom tested positive for covid     Cough         History of Present Illness       58-year-old male with a chief complaint of sore throat and a dry cough  Patient states that he tested positive 2 days ago  Patient is vaccinated  Patient denies any fever chills  Review of Systems   Review of Systems   Constitutional: Negative for chills and fever  HENT: Positive for sore throat   Negative for congestion and rhinorrhea  Eyes: Negative for discharge and visual disturbance  Respiratory: Positive for cough  Negative for shortness of breath and wheezing  Cardiovascular: Negative for chest pain and palpitations  Gastrointestinal: Negative for abdominal pain and vomiting  Endocrine: Negative for polydipsia and polyuria  Genitourinary: Negative for dysuria and hematuria  Musculoskeletal: Negative for arthralgias, gait problem and neck stiffness  Skin: Negative for rash and wound  Neurological: Negative for dizziness and headaches  Psychiatric/Behavioral: Negative for confusion and suicidal ideas           Current Medications       Current Outpatient Medications:     atorvastatin (LIPITOR) 20 mg tablet, TAKE 1 TABLET BY MOUTH DAILY AT BEDTIME, Disp: 90 tablet, Rfl: 3    busPIRone (BUSPAR) 7 5 mg tablet, Take 1 tablet (7 5 mg total) by mouth 2 (two) times a day, Disp: 60 tablet, Rfl: 5    lisinopril (ZESTRIL) 20 mg tablet, TAKE 1 TABLET BY MOUTH EVERY DAY, Disp: 90 tablet, Rfl: 1    Multiple Vitamin (MULTIVITAMIN) tablet, Take 1 tablet by mouth daily, Disp: , Rfl:     Omega-3 Fatty Acids (OMEGA 3 500 PO), Take by mouth daily, Disp: , Rfl:     aspirin (ECOTRIN LOW STRENGTH) 81 mg EC tablet, Take 1 tablet (81 mg total) by mouth daily (Patient not taking: Reported on 1/9/2022 ), Disp: 90 tablet, Rfl: 1    benzonatate (TESSALON PERLES) 100 mg capsule, Take 1 capsule (100 mg total) by mouth 3 (three) times a day as needed for cough, Disp: 30 capsule, Rfl: 0    nicotine (NICODERM CQ) 14 mg/24hr TD 24 hr patch, Place 1 patch on the skin every 24 hours (Patient not taking: Reported on 1/9/2022 ), Disp: 28 patch, Rfl: 0    triamcinolone (KENALOG) 0 1 % cream, Apply topically 2 (two) times a day (Patient not taking: Reported on 1/9/2022 ), Disp: 30 g, Rfl: 0    varenicline (CHANTIX CAROLE) 0 5 MG X 11 & 1 MG X 42 tablet, Take one 0 5 mg tablet by mouth once daily for 3 days, then one 0 5 mg tablet by mouth twice daily for 4 days, then one 1 mg tablet by mouth twice daily  (Patient not taking: Reported on 8/4/2021), Disp: 53 tablet, Rfl: 0    varenicline (Chantix Starting Month Pak) 0 5 MG X 11 & 1 MG X 42 tablet, TAKE ONE 0 5 MG TABLET BY MOUTH ONCE DAILY FOR 3 DAYS, THEN ONE 0 5 MG TABLET BY MOUTH TWICE DAILY FOR 4 DAYS, THEN ONE 1 MG TABLET BY MOUTH TWICE DAILY  (Patient not taking: Reported on 1/9/2022 ), Disp: 53 tablet, Rfl: 0    varenicline (CHANTIX) 1 mg tablet, Take 1 tablet (1 mg total) by mouth 2 (two) times a day (Patient not taking: Reported on 1/9/2022 ), Disp: 60 tablet, Rfl: 2    venlafaxine (EFFEXOR-XR) 37 5 mg 24 hr capsule, Take 1 capsule (37 5 mg total) by mouth daily (Patient not taking: Reported on 1/9/2022 ), Disp: 90 capsule, Rfl: 1    zolpidem (AMBIEN) 5 mg tablet, Take 1 tablet (5 mg total) by mouth daily at bedtime as needed for sleep (Patient not taking: Reported on 1/9/2022 ), Disp: 30 tablet, Rfl: 0    Current Allergies     Allergies as of 01/09/2022    (No Known Allergies)            The following portions of the patient's history were reviewed and updated as appropriate: allergies, current medications, past family history, past medical history, past social history, past surgical history and problem list      Past Medical History:   Diagnosis Date    Atypical chest pain     Hyperlipidemia     last assessed 1/17/17    Hypertension        History reviewed  No pertinent surgical history  Family History   Problem Relation Age of Onset    Hypertension Mother     Heart disease Father         cardiac d/o    Diabetes Father     Heart attack Brother          Medications have been verified  Objective   Ht 5' 8" (1 727 m)   Wt 102 kg (225 lb)   BMI 34 21 kg/m²        Physical Exam     Physical Exam  Vitals and nursing note reviewed  Constitutional:       General: He is not in acute distress  Appearance: Normal appearance   He is not ill-appearing, toxic-appearing or diaphoretic  Comments: 14-year-old male sitting on the stretcher with a dry cough  HENT:      Head: Normocephalic and atraumatic  Nose: Nose normal       Mouth/Throat:      Mouth: Mucous membranes are moist       Pharynx: Oropharynx is clear  Posterior oropharyngeal erythema present  No oropharyngeal exudate  Comments: Mild erythema of the posterior pharynx  Eyes:      Extraocular Movements: Extraocular movements intact  Pupils: Pupils are equal, round, and reactive to light  Neck:      Vascular: No carotid bruit  Cardiovascular:      Rate and Rhythm: Normal rate and regular rhythm  Pulses: Normal pulses  Pulmonary:      Effort: Pulmonary effort is normal       Breath sounds: Normal breath sounds  Abdominal:      General: Abdomen is flat  Bowel sounds are normal  There is no distension  Palpations: Abdomen is soft  There is no mass  Tenderness: There is no abdominal tenderness  There is no right CVA tenderness, left CVA tenderness, guarding or rebound  Hernia: No hernia is present  Musculoskeletal:         General: No swelling, tenderness, deformity or signs of injury  Normal range of motion  Cervical back: Normal range of motion and neck supple  No rigidity  No muscular tenderness  Right lower leg: No edema  Left lower leg: No edema  Lymphadenopathy:      Cervical: No cervical adenopathy  Skin:     General: Skin is warm and dry  Coloration: Skin is not jaundiced or pale  Findings: No bruising, erythema, lesion or rash  Neurological:      General: No focal deficit present  Mental Status: He is alert and oriented to person, place, and time  Cranial Nerves: No cranial nerve deficit  Motor: No weakness     Psychiatric:         Mood and Affect: Mood normal

## 2022-01-09 NOTE — PATIENT INSTRUCTIONS
Hydration and rest   Check MY CHART in 5 daysfor Covid results  Home isolation until results come back; if + quarantine 5 days from the onset of symptoms, and fever free for 24 hrs  Wear your mask and wash hands often  PCP follow up in 3-5 days; call them first  Go to an emergency department if difficulty breathing occurs  Recommended supplements for potential COVID-19 is the following: Vitamin D3 2000 IU  daily ,  Vitamin C 1g  every 12 hours , Multivitamin Daily       COVID-19 Home Care Guidelines    Your healthcare provider and/or public health staff have evaluated you and have determined that you do not need to remain in the hospital at this time  At this time you can be isolated at home where you will be monitored by staff from your local or state health department  You should carefully follow the prevention and isolation steps below until a healthcare provider or local or state health department says that you can return to your normal activities  Stay home except to get medical care    People who are mildly ill with COVID-19 are able to isolate at home during their illness  You should restrict activities outside your home, except for getting medical care  Do not go to work, school, or public areas  Avoid using public transportation, ride-sharing, or taxis  Separate yourself from other people and animals in your home    People: As much as possible, you should stay in a specific room and away from other people in your home  Also, you should use a separate bathroom, if available  Animals: You should restrict contact with pets and other animals while you are sick with COVID-19, just like you would around other people  Although there have not been reports of pets or other animals becoming sick with COVID-19, it is still recommended that people sick with COVID-19 limit contact with animals until more information is known about the virus   When possible, have another member of your household care for your animals while you are sick  If you are sick with COVID-19, avoid contact with your pet, including petting, snuggling, being kissed or licked, and sharing food  If you must care for your pet or be around animals while you are sick, wash your hands before and after you interact with pets and wear a facemask  See COVID-19 and Animals for more information  Call ahead before visiting your doctor    If you have a medical appointment, call the healthcare provider and tell them that you have or may have COVID-19  This will help the healthcare providers office take steps to keep other people from getting infected or exposed  Wear a facemask    You should wear a facemask when you are around other people (e g , sharing a room or vehicle) or pets and before you enter a healthcare providers office  If you are not able to wear a facemask (for example, because it causes trouble breathing), then people who live with you should not stay in the same room with you, or they should wear a facemask if they enter your room  Cover your coughs and sneezes    Cover your mouth and nose with a tissue when you cough or sneeze  Throw used tissues in a lined trash can  Immediately wash your hands with soap and water for at least 20 seconds or, if soap and water are not available, clean your hands with an alcohol-based hand  that contains at least 60% alcohol  Clean your hands often    Wash your hands often with soap and water for at least 20 seconds, especially after blowing your nose, coughing, or sneezing; going to the bathroom; and before eating or preparing food  If soap and water are not readily available, use an alcohol-based hand  with at least 60% alcohol, covering all surfaces of your hands and rubbing them together until they feel dry  Soap and water are the best option if hands are visibly dirty  Avoid touching your eyes, nose, and mouth with unwashed hands      Avoid sharing personal household items    You should not share dishes, drinking glasses, cups, eating utensils, towels, or bedding with other people or pets in your home  After using these items, they should be washed thoroughly with soap and water  Clean all high-touch surfaces everyday    High touch surfaces include counters, tabletops, doorknobs, bathroom fixtures, toilets, phones, keyboards, tablets, and bedside tables  Also, clean any surfaces that may have blood, stool, or body fluids on them  Use a household cleaning spray or wipe, according to the label instructions  Labels contain instructions for safe and effective use of the cleaning product including precautions you should take when applying the product, such as wearing gloves and making sure you have good ventilation during use of the product  Monitor your symptoms    Seek prompt medical attention if your illness is worsening (e g , difficulty breathing)  Before seeking care, call your healthcare provider and tell them that you have, or are being evaluated for, COVID-19  Put on a facemask before you enter the facility  These steps will help the healthcare providers office to keep other people in the office or waiting room from getting infected or exposed  Ask your healthcare provider to call the local or [facility] health department  Persons who are placed under active monitoring or facilitated self-monitoring should follow instructions provided by their local health department or occupational health professionals, as appropriate  If you have a medical emergency and need to call 911, notify the dispatch personnel that you have, or are being evaluated for COVID-19  If possible, put on a facemask before emergency medical services arrive      Discontinuing home isolation    Patients with confirmed COVID-19 should remain under home isolation precautions until the following conditions are met:   - They have had no fever for at least 24 hours (that is one full day of no fever without the use medicine that reduces fevers)  AND  - other symptoms have improved (for example, when their cough or shortness of breath have improved)  AND  - If had mild or moderate illness, at least 10 days have passed since their symptoms first appeared or if severe illness (needed oxygen) or immunosuppressed, at least 20 days have passed since symptoms first appeared  Patients with confirmed COVID-19 should also notify close contacts (including their workplace) and ask that they self-quarantine  Currently, close contact is defined as being within 6 feet for 15 minutes or more from the period 24 hours starting 48 hours before symptom onset to the time at which the patient went into isolation  Close contacts of patients diagnosed with COVID-19 should be instructed by the patient to self-quarantine for 14 days from the last time of their last contact with the patient       Source: [employer]

## 2022-01-10 LAB — SARS-COV-2 RNA RESP QL NAA+PROBE: NEGATIVE

## 2022-02-16 ENCOUNTER — APPOINTMENT (OUTPATIENT)
Dept: LAB | Facility: CLINIC | Age: 42
End: 2022-02-16
Payer: COMMERCIAL

## 2022-02-16 DIAGNOSIS — E78.2 MIXED HYPERLIPIDEMIA: ICD-10-CM

## 2022-02-16 DIAGNOSIS — I10 ESSENTIAL HYPERTENSION: ICD-10-CM

## 2022-02-16 LAB
ALBUMIN SERPL BCP-MCNC: 4.3 G/DL (ref 3.5–5)
ALP SERPL-CCNC: 92 U/L (ref 46–116)
ALT SERPL W P-5'-P-CCNC: 95 U/L (ref 12–78)
ANION GAP SERPL CALCULATED.3IONS-SCNC: 6 MMOL/L (ref 4–13)
AST SERPL W P-5'-P-CCNC: 40 U/L (ref 5–45)
BILIRUB SERPL-MCNC: 0.51 MG/DL (ref 0.2–1)
BUN SERPL-MCNC: 11 MG/DL (ref 5–25)
CALCIUM SERPL-MCNC: 9.4 MG/DL (ref 8.3–10.1)
CHLORIDE SERPL-SCNC: 105 MMOL/L (ref 100–108)
CHOLEST SERPL-MCNC: 195 MG/DL
CO2 SERPL-SCNC: 26 MMOL/L (ref 21–32)
CREAT SERPL-MCNC: 0.81 MG/DL (ref 0.6–1.3)
GFR SERPL CREATININE-BSD FRML MDRD: 109 ML/MIN/1.73SQ M
GLUCOSE P FAST SERPL-MCNC: 91 MG/DL (ref 65–99)
HDLC SERPL-MCNC: 38 MG/DL
LDLC SERPL CALC-MCNC: 107 MG/DL (ref 0–100)
NONHDLC SERPL-MCNC: 157 MG/DL
POTASSIUM SERPL-SCNC: 3.9 MMOL/L (ref 3.5–5.3)
PROT SERPL-MCNC: 8.7 G/DL (ref 6.4–8.2)
SODIUM SERPL-SCNC: 137 MMOL/L (ref 136–145)
TRIGL SERPL-MCNC: 252 MG/DL
TSH SERPL DL<=0.05 MIU/L-ACNC: 0.53 UIU/ML (ref 0.36–3.74)

## 2022-02-16 PROCEDURE — 84443 ASSAY THYROID STIM HORMONE: CPT

## 2022-02-16 PROCEDURE — 36415 COLL VENOUS BLD VENIPUNCTURE: CPT

## 2022-02-16 PROCEDURE — 80061 LIPID PANEL: CPT

## 2022-02-16 PROCEDURE — 80053 COMPREHEN METABOLIC PANEL: CPT

## 2022-02-17 ENCOUNTER — OFFICE VISIT (OUTPATIENT)
Dept: FAMILY MEDICINE CLINIC | Facility: CLINIC | Age: 42
End: 2022-02-17
Payer: COMMERCIAL

## 2022-02-17 VITALS
BODY MASS INDEX: 33.95 KG/M2 | WEIGHT: 224 LBS | DIASTOLIC BLOOD PRESSURE: 72 MMHG | HEIGHT: 68 IN | OXYGEN SATURATION: 97 % | HEART RATE: 102 BPM | TEMPERATURE: 97.5 F | SYSTOLIC BLOOD PRESSURE: 110 MMHG

## 2022-02-17 DIAGNOSIS — F32.A DEPRESSION, UNSPECIFIED DEPRESSION TYPE: ICD-10-CM

## 2022-02-17 DIAGNOSIS — E78.2 MIXED HYPERLIPIDEMIA: ICD-10-CM

## 2022-02-17 DIAGNOSIS — I10 ESSENTIAL HYPERTENSION: ICD-10-CM

## 2022-02-17 DIAGNOSIS — R06.83 LOUD SNORING: Primary | ICD-10-CM

## 2022-02-17 PROBLEM — Z71.6 ENCOUNTER FOR SMOKING CESSATION COUNSELING: Status: RESOLVED | Noted: 2021-08-04 | Resolved: 2022-02-17

## 2022-02-17 PROCEDURE — 3725F SCREEN DEPRESSION PERFORMED: CPT | Performed by: FAMILY MEDICINE

## 2022-02-17 PROCEDURE — 3008F BODY MASS INDEX DOCD: CPT | Performed by: FAMILY MEDICINE

## 2022-02-17 PROCEDURE — 1036F TOBACCO NON-USER: CPT | Performed by: FAMILY MEDICINE

## 2022-02-17 PROCEDURE — 99214 OFFICE O/P EST MOD 30 MIN: CPT | Performed by: FAMILY MEDICINE

## 2022-02-17 NOTE — ASSESSMENT & PLAN NOTE
Discussed reviewed current and previous lipid profiles, elevation triglyceride secondary to dietary indiscretions reviewed encourage modifications to portions and control, fiber, encourage exercise program, continue present statin

## 2022-02-17 NOTE — PROGRESS NOTES
BMI Counseling: Body mass index is 34 06 kg/m²  The BMI is above normal  Nutrition recommendations include decreasing portion sizes, decreasing fast food intake, limiting drinks that contain sugar, moderation in carbohydrate intake, increasing intake of lean protein, reducing intake of saturated and trans fat and reducing intake of cholesterol  Exercise recommendations include moderate physical activity 150 minutes/week and exercising 3-5 times per week  No pharmacotherapy was ordered  Rationale for BMI follow-up plan is due to patient being overweight or obese  Assessment/Plan:     Chronic Problems:  No problem-specific Assessment & Plan notes found for this encounter  Visit Diagnosis:  Diagnoses and all orders for this visit:    Loud snoring  -     Ambulatory Referral to Sleep Medicine; Future    Essential hypertension    Mixed hyperlipidemia          Subjective:    Patient ID: Sury Rizzo is a 42 y o  male  Sleep issues   Has been an on going issue , family / spouse do report sleep disturbance , heavy snoring , unrefreshed sleep , on going issue with anxiety , for which now taking buspar   htn, lisinopril 20 mg taken daily negative Ms  dosing monitoring,  Negative chest pain palpitations shortness of breath difficulty breathing cough lesions rash  Chol Lipitor 28 taking daily negative Ms  doses no issues in regard to medication  Diet off, admittedly needs improvement in overall portion and to his  Exercise limited needs to return to previous program unfortunately mother-in-law has taken over exercise room        The following portions of the patient's history were reviewed and updated as appropriate: allergies, current medications, past family history, past medical history, past social history, past surgical history and problem list     Review of Systems   Constitutional: Negative for appetite change, chills, fever and unexpected weight change     HENT: Negative for congestion, dental problem, ear pain, hearing loss, postnasal drip, rhinorrhea, sinus pressure, sinus pain, sneezing, sore throat, tinnitus and voice change  Eyes: Negative for visual disturbance  Respiratory: Negative for apnea, cough, chest tightness and shortness of breath  Cardiovascular: Negative for chest pain, palpitations and leg swelling  Gastrointestinal: Negative for abdominal pain, blood in stool, constipation, diarrhea, nausea and vomiting  Endocrine: Negative for cold intolerance, heat intolerance, polydipsia, polyphagia and polyuria  Genitourinary: Negative for decreased urine volume, difficulty urinating, dysuria, frequency and hematuria  Musculoskeletal: Negative for arthralgias, back pain, gait problem, joint swelling and myalgias  Skin: Negative for color change, rash and wound  Allergic/Immunologic: Negative for environmental allergies and food allergies  Neurological: Negative for dizziness, syncope, weakness, light-headedness, numbness and headaches  Hematological: Negative for adenopathy  Does not bruise/bleed easily  Psychiatric/Behavioral: Positive for sleep disturbance  Negative for suicidal ideas  The patient is not nervous/anxious            /72 (BP Location: Left arm, Patient Position: Sitting)   Pulse 102   Temp 97 5 °F (36 4 °C) (Tympanic)   Ht 5' 8" (1 727 m)   Wt 102 kg (224 lb)   SpO2 97%   BMI 34 06 kg/m²   Social History     Socioeconomic History    Marital status: Single     Spouse name: Not on file    Number of children: Not on file    Years of education: Not on file    Highest education level: Not on file   Occupational History    Occupation: employed   Tobacco Use    Smoking status: Former Smoker    Smokeless tobacco: Never Used   Vaping Use    Vaping Use: Never used   Substance and Sexual Activity    Alcohol use: Yes     Comment: social    Drug use: No    Sexual activity: Not on file   Other Topics Concern    Not on file   Social History Narrative    Always uses seat belt     Daily caffeinated coffee consumption    Lives with family     Social Determinants of Health     Financial Resource Strain: Not on file   Food Insecurity: Not on file   Transportation Needs: Not on file   Physical Activity: Not on file   Stress: Not on file   Social Connections: Not on file   Intimate Partner Violence: Not on file   Housing Stability: Not on file     Past Medical History:   Diagnosis Date    Atypical chest pain     Hyperlipidemia     last assessed 1/17/17    Hypertension      Family History   Problem Relation Age of Onset    Hypertension Mother     Heart disease Father         cardiac d/o    Diabetes Father     Heart attack Brother      History reviewed  No pertinent surgical history  Current Outpatient Medications:     atorvastatin (LIPITOR) 20 mg tablet, TAKE 1 TABLET BY MOUTH DAILY AT BEDTIME, Disp: 90 tablet, Rfl: 3    busPIRone (BUSPAR) 7 5 mg tablet, Take 1 tablet (7 5 mg total) by mouth 2 (two) times a day, Disp: 60 tablet, Rfl: 5    lisinopril (ZESTRIL) 20 mg tablet, TAKE 1 TABLET BY MOUTH EVERY DAY, Disp: 90 tablet, Rfl: 1    Multiple Vitamin (MULTIVITAMIN) tablet, Take 1 tablet by mouth daily, Disp: , Rfl:     Omega-3 Fatty Acids (OMEGA 3 500 PO), Take by mouth daily, Disp: , Rfl:     benzonatate (TESSALON PERLES) 100 mg capsule, Take 1 capsule (100 mg total) by mouth 3 (three) times a day as needed for cough (Patient not taking: Reported on 2/17/2022 ), Disp: 30 capsule, Rfl: 0    triamcinolone (KENALOG) 0 1 % cream, Apply topically 2 (two) times a day (Patient not taking: Reported on 1/9/2022 ), Disp: 30 g, Rfl: 0    varenicline (CHANTIX CAROLE) 0 5 MG X 11 & 1 MG X 42 tablet, Take one 0 5 mg tablet by mouth once daily for 3 days, then one 0 5 mg tablet by mouth twice daily for 4 days, then one 1 mg tablet by mouth twice daily   (Patient not taking: Reported on 8/4/2021), Disp: 53 tablet, Rfl: 0    varenicline (Chantix Starting Month Carole) 0 5 MG X 11 & 1 MG X 42 tablet, TAKE ONE 0 5 MG TABLET BY MOUTH ONCE DAILY FOR 3 DAYS, THEN ONE 0 5 MG TABLET BY MOUTH TWICE DAILY FOR 4 DAYS, THEN ONE 1 MG TABLET BY MOUTH TWICE DAILY  (Patient not taking: Reported on 1/9/2022 ), Disp: 53 tablet, Rfl: 0    zolpidem (AMBIEN) 5 mg tablet, Take 1 tablet (5 mg total) by mouth daily at bedtime as needed for sleep (Patient not taking: Reported on 1/9/2022 ), Disp: 30 tablet, Rfl: 0    No Known Allergies       Lab Review   Appointment on 02/16/2022   Component Date Value    Sodium 02/16/2022 137     Potassium 02/16/2022 3 9     Chloride 02/16/2022 105     CO2 02/16/2022 26     ANION GAP 02/16/2022 6     BUN 02/16/2022 11     Creatinine 02/16/2022 0 81     Glucose, Fasting 02/16/2022 91     Calcium 02/16/2022 9 4     AST 02/16/2022 40     ALT 02/16/2022 95*    Alkaline Phosphatase 02/16/2022 92     Total Protein 02/16/2022 8 7*    Albumin 02/16/2022 4 3     Total Bilirubin 02/16/2022 0 51     eGFR 02/16/2022 109     Cholesterol 02/16/2022 195     Triglycerides 02/16/2022 252*    HDL, Direct 02/16/2022 38*    LDL Calculated 02/16/2022 107*    Non-HDL-Chol (CHOL-HDL) 02/16/2022 157     TSH 3RD GENERATON 02/16/2022 0 534    Office Visit on 01/09/2022   Component Date Value    SARS-CoV-2 01/09/2022 Negative         Imaging: No results found  Objective:     Physical Exam  Constitutional:       General: He is not in acute distress  Appearance: He is well-developed  He is not ill-appearing or toxic-appearing  HENT:      Head: Normocephalic and atraumatic  Cardiovascular:      Rate and Rhythm: Normal rate and regular rhythm  Heart sounds: Normal heart sounds  Pulmonary:      Effort: Pulmonary effort is normal       Breath sounds: Normal breath sounds  Musculoskeletal:         General: Normal range of motion  Cervical back: Normal range of motion and neck supple  Skin:     General: Skin is warm and dry     Neurological:      Mental Status: He is alert and oriented to person, place, and time  Deep Tendon Reflexes: Reflexes are normal and symmetric  Psychiatric:         Behavior: Behavior normal          Thought Content: Thought content normal          Judgment: Judgment normal            There are no Patient Instructions on file for this visit  PITA Jeter    Portions of the record may have been created with voice recognition software  Occasional wrong word or "sound a like" substitutions may have occurred due to the inherent limitations of voice recognition software  Read the chart carefully and recognize, using context, where substitutions have occurred

## 2022-02-17 NOTE — ASSESSMENT & PLAN NOTE
Stable, within parameters continue current medications, anchors return to dietary modifications, exercise program

## 2022-03-08 ENCOUNTER — OFFICE VISIT (OUTPATIENT)
Dept: CARDIOLOGY CLINIC | Facility: CLINIC | Age: 42
End: 2022-03-08
Payer: COMMERCIAL

## 2022-03-08 VITALS
HEART RATE: 99 BPM | DIASTOLIC BLOOD PRESSURE: 74 MMHG | RESPIRATION RATE: 16 BRPM | OXYGEN SATURATION: 99 % | SYSTOLIC BLOOD PRESSURE: 124 MMHG | BODY MASS INDEX: 33.95 KG/M2 | WEIGHT: 224 LBS | HEIGHT: 68 IN

## 2022-03-08 DIAGNOSIS — I10 PRIMARY HYPERTENSION: Primary | ICD-10-CM

## 2022-03-08 DIAGNOSIS — R06.00 DYSPNEA ON EXERTION: ICD-10-CM

## 2022-03-08 DIAGNOSIS — E78.2 MIXED HYPERLIPIDEMIA: ICD-10-CM

## 2022-03-08 DIAGNOSIS — Z82.49 FAMILY HISTORY OF PREMATURE CORONARY ARTERY DISEASE: ICD-10-CM

## 2022-03-08 PROCEDURE — 93000 ELECTROCARDIOGRAM COMPLETE: CPT | Performed by: INTERNAL MEDICINE

## 2022-03-08 PROCEDURE — 99214 OFFICE O/P EST MOD 30 MIN: CPT | Performed by: INTERNAL MEDICINE

## 2022-03-08 PROCEDURE — 1036F TOBACCO NON-USER: CPT | Performed by: INTERNAL MEDICINE

## 2022-03-08 PROCEDURE — 3008F BODY MASS INDEX DOCD: CPT | Performed by: INTERNAL MEDICINE

## 2022-03-08 NOTE — PROGRESS NOTES
[facility]  [id number]  [place] [id number]  Cardiology Follow Up    Mary Ann Triplett  1980  [id number]      1  Primary hypertension     2  Dyspnea on exertion  Echo complete w/ contrast if indicated    POCT ECG    CANCELED: Lipid Panel with Direct LDL reflex    CANCELED: Comprehensive metabolic panel   3  Mixed hyperlipidemia  Echo complete w/ contrast if indicated    POCT ECG    CANCELED: Lipid Panel with Direct LDL reflex    CANCELED: Comprehensive metabolic panel   4  Family history of premature coronary artery disease         Chief Complaint   Patient presents with    Follow-up       Interval History:   41-year-old male with hypertension, hyperlipidemia, family history of premature coronary artery presented for cardiology follow-up    Patient was seen last in Cardiology Clinic July 2020  He came for follow-up today  He was doing relatively fine except he has stop doing exercise for last 2 months  He recently started doing exercise and feeling short of breath on heavy exertion  He has also gained 5-10 lb  He denies chest pain, shortness of breath at rest, palpitation, dizziness, orthopnea, leg edema or loss of consciousness  He is also undergoing a stress due to family reason    He was intermittently smoking but has quit for last 8 months  Is not taking aspirin due to new guidelines    Current medications reviewed  Labs done in February 2022 reviewed  Creatinine 0 81, mildly elevated ALT   with triglyceride 252  TSH within normal limits    Review of Systems:   All review of system negative except as mentioned above    Patient Active Problem List   Diagnosis    Mixed hyperlipidemia    Essential hypertension    Chest pain due to myocardial ischemia    Family history of premature CAD    Depression     Past Medical History:   Diagnosis Date    Atypical chest pain     Hyperlipidemia     last assessed 1/17/17    Hypertension      Social History     Socioeconomic History    Marital status: Single     Spouse name: Not on file    Number of children: Not on file    Years of education: Not on file    Highest education level: Not on file   Occupational History    Occupation: employed   Tobacco Use    Smoking status: Former Smoker    Smokeless tobacco: Never Used   Vaping Use    Vaping Use: Never used   Substance and Sexual Activity    Alcohol use: Yes     Comment: social    Drug use: No    Sexual activity: Not on file   Other Topics Concern    Not on file   Social History Narrative    Always uses seat belt     Daily caffeinated coffee consumption    Lives with family     Social Determinants of Health     Financial Resource Strain: Not on file   Food Insecurity: Not on file   Transportation Needs: Not on file   Physical Activity: Not on file   Stress: Not on file   Social Connections: Not on file   Intimate Partner Violence: Not on file   Housing Stability: Not on file      Family History   Problem Relation Age of Onset    Hypertension Mother     Heart disease Father         cardiac d/o    Diabetes Father     Heart attack Brother      History reviewed  No pertinent surgical history      Current Outpatient Medications:     atorvastatin (LIPITOR) 20 mg tablet, TAKE 1 TABLET BY MOUTH DAILY AT BEDTIME, Disp: 90 tablet, Rfl: 3    lisinopril (ZESTRIL) 20 mg tablet, TAKE 1 TABLET BY MOUTH EVERY DAY, Disp: 90 tablet, Rfl: 1    Omega-3 Fatty Acids (OMEGA 3 500 PO), Take by mouth daily, Disp: , Rfl:     zolpidem (AMBIEN) 5 mg tablet, Take 1 tablet (5 mg total) by mouth daily at bedtime as needed for sleep, Disp: 30 tablet, Rfl: 0    busPIRone (BUSPAR) 7 5 mg tablet, Take 1 tablet (7 5 mg total) by mouth 2 (two) times a day (Patient not taking: Reported on 3/8/2022 ), Disp: 60 tablet, Rfl: 5    Multiple Vitamin (MULTIVITAMIN) tablet, Take 1 tablet by mouth daily (Patient not taking: Reported on 3/8/2022 ), Disp: , Rfl:     triamcinolone (KENALOG) 0 1 % cream, Apply topically 2 (two) times a day (Patient not taking: Reported on 1/9/2022 ), Disp: 30 g, Rfl: 0  No Known Allergies    Labs:  Appointment on 02/16/2022   Component Date Value    Sodium 02/16/2022 137     Potassium 02/16/2022 3 9     Chloride 02/16/2022 105     CO2 02/16/2022 26     ANION GAP 02/16/2022 6     BUN 02/16/2022 11     Creatinine 02/16/2022 0 81     Glucose, Fasting 02/16/2022 91     Calcium 02/16/2022 9 4     AST 02/16/2022 40     ALT 02/16/2022 95*    Alkaline Phosphatase 02/16/2022 92     Total Protein 02/16/2022 8 7*    Albumin 02/16/2022 4 3     Total Bilirubin 02/16/2022 0 51     eGFR 02/16/2022 109     Cholesterol 02/16/2022 195     Triglycerides 02/16/2022 252*    HDL, Direct 02/16/2022 38*    LDL Calculated 02/16/2022 107*    Non-HDL-Chol (CHOL-HDL) 02/16/2022 157     TSH 3RD GENERATON 02/16/2022 0 534    Office Visit on 01/09/2022   Component Date Value    SARS-CoV-2 01/09/2022 Negative      Imaging: No results found  Physical Exam:  General:  Obese, awake, alert and oriented x3, not in distress  Neck: supple, no JVD  Eyes: PERRL, conjunctiva normal  Lungs:  Bilateral air entry positive, no wheeze/rhonchi or crackle  Heart:  S1-S2 normal, no murmur  Abdomen:  Soft ,nondistended ,nontender, bowel sounds positive  Extremities:  No leg edema, no deformity, ROM normal  Neuro:  Moving all extremities, speech clear  Skin: warm, no rash     /74 (BP Location: Left arm, Patient Position: Sitting, Cuff Size: Large)   Pulse 99   Resp 16   Ht 5' 8" (1 727 m)   Wt 102 kg (224 lb)   SpO2 99%   BMI 34 06 kg/m²     Cardiographics :  EKG today showed sinus rhythm, possible left atrial enlargement, nonspecific ST changes     June of 2018 exercise treadmill stress test exercise time 13 minutes, Mets achieved 7 2, E stress ECG negative for ischemia   echo in June of 2018 showed EF 60 65% without regional wall motion abnormality      Assessment:    1  Dyspnea on exertion    Likely combination of deconditioning and weight gain  Patient denies any chest pain  He is able to exercise and gets shortness of breath only on heavy exertions  Denies shortness of breath at rest    2  Hypertension  3  Hyperlipidemia  LDL not at goal likely due to weight gain and patient was not watching his diet  4  Family history of premature coronary artery disease  5  BMI 34    Recommendations:    I would get 2D echocardiogram to evaluate for structural heart disease  Patient to continue 20 mg lisinopril and 20 mg Lipitor    Risk and benefit of low-dose aspirin discuss for primary prevention including new guideline  Patient never had any bleeding issues and okay to take baby aspirin    He has repeat labs ordered and advised to repeat lipid panel after 3 months    He was advised to take low-salt, low-fat/low-cholesterol diet and try to lose weight  He was educated about cardiac symptoms to watch out for  He was advised to call 911 or go to nearest ED if his symptoms progresses or happens at rest or as needed  He was informed to call us back if his symptoms persist and advised to gradually increase exercise intensity  Return to clinic in 6 months or early as needed  Above all discussed with patient    Patient understands and agrees

## 2022-03-22 DIAGNOSIS — I10 ESSENTIAL HYPERTENSION: ICD-10-CM

## 2022-03-22 RX ORDER — LISINOPRIL 20 MG/1
TABLET ORAL
Qty: 90 TABLET | Refills: 1 | Status: SHIPPED | OUTPATIENT
Start: 2022-03-22

## 2022-04-06 ENCOUNTER — HOSPITAL ENCOUNTER (OUTPATIENT)
Dept: NON INVASIVE DIAGNOSTICS | Facility: CLINIC | Age: 42
Discharge: HOME | End: 2022-04-06
Payer: COMMERCIAL

## 2022-04-06 VITALS
HEIGHT: 68 IN | BODY MASS INDEX: 33.95 KG/M2 | DIASTOLIC BLOOD PRESSURE: 74 MMHG | SYSTOLIC BLOOD PRESSURE: 124 MMHG | HEART RATE: 85 BPM | WEIGHT: 224 LBS

## 2022-04-06 DIAGNOSIS — R06.00 DYSPNEA ON EXERTION: ICD-10-CM

## 2022-04-06 DIAGNOSIS — E78.2 MIXED HYPERLIPIDEMIA: ICD-10-CM

## 2022-04-06 LAB
AORTIC ROOT: 3.4 CM
APICAL FOUR CHAMBER EJECTION FRACTION: 55 %
ASCENDING AORTA: 3.2 CM (ref 2.15–3.21)
AV LVOT PEAK GRADIENT: 5 MMHG
AV PEAK GRADIENT: 8 MMHG
E WAVE DECELERATION TIME: 154 MS
FRACTIONAL SHORTENING: 33 % (ref 28–44)
INTERVENTRICULAR SEPTUM IN DIASTOLE (PARASTERNAL SHORT AXIS VIEW): 1.1 CM
INTERVENTRICULAR SEPTUM: 1.1 CM (ref 0.56–1.04)
LAAS-AP2: 19.4 CM2
LAAS-AP4: 16.1 CM2
LEFT ATRIUM AREA SYSTOLE SINGLE PLANE A4C: 15.9 CM2
LEFT ATRIUM SIZE: 3.9 CM
LEFT INTERNAL DIMENSION IN SYSTOLE: 2.7 CM (ref 3.95–5.98)
LEFT VENTRICULAR INTERNAL DIMENSION IN DIASTOLE: 4 CM (ref 6.57–9.78)
LEFT VENTRICULAR POSTERIOR WALL IN END DIASTOLE: 1.1 CM (ref 0.54–1.03)
LEFT VENTRICULAR STROKE VOLUME: 42 ML
LVSV (TEICH): 42 ML
MITRAL REGURGITATION PEAK VELOCITY: 2.5 M/S
MITRAL VALVE REGURGITANT PEAK GRADIENT: 25 MMHG
MV E'TISSUE VEL-SEP: 11 CM/S
MV PEAK A VEL: 0.84 M/S
MV PEAK E VEL: 83 CM/S
MV STENOSIS PRESSURE HALF TIME: 45 MS
MV VALVE AREA P 1/2 METHOD: 4.89 CM2
RIGHT ATRIAL 2D VOLUME: 18 ML
RIGHT ATRIUM AREA SYSTOLE A4C: 10 CM2
RIGHT VENTRICLE ID DIMENSION: 2.8 CM
SL CV LEFT ATRIUM LENGTH A2C: 5.7 CM
SL CV PED ECHO LEFT VENTRICLE DIASTOLIC VOLUME (MOD BIPLANE) 2D: 70 ML
SL CV PED ECHO LEFT VENTRICLE SYSTOLIC VOLUME (MOD BIPLANE) 2D: 28 ML
Z-SCORE OF ASCENDING AORTA: 1.93
Z-SCORE OF INTERVENTRICULAR SEPTUM IN END DIASTOLE: 2.41
Z-SCORE OF LEFT VENTRICULAR DIMENSION IN END DIASTOLE: -6.95
Z-SCORE OF LEFT VENTRICULAR DIMENSION IN END SYSTOLE: -4.64
Z-SCORE OF LEFT VENTRICULAR POSTERIOR WALL IN END DIASTOLE: 2.52

## 2022-04-06 PROCEDURE — 93306 TTE W/DOPPLER COMPLETE: CPT

## 2022-04-06 PROCEDURE — 93306 TTE W/DOPPLER COMPLETE: CPT | Performed by: INTERNAL MEDICINE

## 2022-04-10 DIAGNOSIS — L30.2 ID REACTION: ICD-10-CM

## 2022-04-10 RX ORDER — VENLAFAXINE HYDROCHLORIDE 37.5 MG/1
37.5 CAPSULE, EXTENDED RELEASE ORAL DAILY
COMMUNITY
Start: 2022-04-10 | End: 2022-04-19 | Stop reason: SDUPTHER

## 2022-04-10 RX ORDER — VENLAFAXINE HYDROCHLORIDE 37.5 MG/1
37.5 CAPSULE, EXTENDED RELEASE ORAL DAILY
Qty: 90 CAPSULE | Refills: 3 | OUTPATIENT
Start: 2022-04-10 | End: 2022-07-09

## 2022-04-19 DIAGNOSIS — F32.A DEPRESSION, UNSPECIFIED DEPRESSION TYPE: Primary | ICD-10-CM

## 2022-04-19 RX ORDER — VENLAFAXINE HYDROCHLORIDE 37.5 MG/1
37.5 CAPSULE, EXTENDED RELEASE ORAL DAILY
Qty: 30 CAPSULE | Refills: 3 | Status: SHIPPED | OUTPATIENT
Start: 2022-04-19 | End: 2022-07-15

## 2022-04-19 NOTE — TELEPHONE ENCOUNTER
Pt called yesterday and left message on [employer]  Requested refill on his mental health med venlafaxine 37 5 mg  He said yesterday was his last day for them and needed a refill

## 2022-07-15 DIAGNOSIS — F32.A DEPRESSION, UNSPECIFIED DEPRESSION TYPE: ICD-10-CM

## 2022-07-15 RX ORDER — VENLAFAXINE HYDROCHLORIDE 37.5 MG/1
CAPSULE, EXTENDED RELEASE ORAL
Qty: 90 CAPSULE | Refills: 1 | Status: SHIPPED | OUTPATIENT
Start: 2022-07-15

## 2022-07-18 DIAGNOSIS — G47.00 INSOMNIA, UNSPECIFIED TYPE: ICD-10-CM

## 2022-07-19 RX ORDER — ZOLPIDEM TARTRATE 5 MG/1
5 TABLET ORAL
Qty: 30 TABLET | Refills: 0 | Status: SHIPPED | OUTPATIENT
Start: 2022-07-19

## 2022-08-15 ENCOUNTER — APPOINTMENT (OUTPATIENT)
Dept: LAB | Facility: CLINIC | Age: 42
End: 2022-08-15
Payer: COMMERCIAL

## 2022-08-15 DIAGNOSIS — E78.2 MIXED HYPERLIPIDEMIA: ICD-10-CM

## 2022-08-15 DIAGNOSIS — I10 ESSENTIAL HYPERTENSION: ICD-10-CM

## 2022-08-15 LAB
ALBUMIN SERPL BCP-MCNC: 4.2 G/DL (ref 3.5–5)
ALP SERPL-CCNC: 94 U/L (ref 46–116)
ALT SERPL W P-5'-P-CCNC: 70 U/L (ref 12–78)
ANION GAP SERPL CALCULATED.3IONS-SCNC: 7 MMOL/L (ref 4–13)
AST SERPL W P-5'-P-CCNC: 38 U/L (ref 5–45)
BASOPHILS # BLD AUTO: 0.05 THOUSANDS/ΜL (ref 0–0.1)
BASOPHILS NFR BLD AUTO: 1 % (ref 0–1)
BILIRUB SERPL-MCNC: 0.46 MG/DL (ref 0.2–1)
BUN SERPL-MCNC: 9 MG/DL (ref 5–25)
CALCIUM SERPL-MCNC: 9 MG/DL (ref 8.3–10.1)
CHLORIDE SERPL-SCNC: 106 MMOL/L (ref 96–108)
CHOLEST SERPL-MCNC: 191 MG/DL
CO2 SERPL-SCNC: 24 MMOL/L (ref 21–32)
CREAT SERPL-MCNC: 0.77 MG/DL (ref 0.6–1.3)
EOSINOPHIL # BLD AUTO: 0.2 THOUSAND/ΜL (ref 0–0.61)
EOSINOPHIL NFR BLD AUTO: 2 % (ref 0–6)
ERYTHROCYTE [DISTWIDTH] IN BLOOD BY AUTOMATED COUNT: 12 % (ref 11.6–15.1)
GFR SERPL CREATININE-BSD FRML MDRD: 111 ML/MIN/1.73SQ M
GLUCOSE P FAST SERPL-MCNC: 92 MG/DL (ref 65–99)
HCT VFR BLD AUTO: 42.5 % (ref 36.5–49.3)
HDLC SERPL-MCNC: 41 MG/DL
HGB BLD-MCNC: 13.2 G/DL (ref 12–17)
IMM GRANULOCYTES # BLD AUTO: 0.03 THOUSAND/UL (ref 0–0.2)
IMM GRANULOCYTES NFR BLD AUTO: 0 % (ref 0–2)
LDLC SERPL CALC-MCNC: 111 MG/DL (ref 0–100)
LYMPHOCYTES # BLD AUTO: 1.89 THOUSANDS/ΜL (ref 0.6–4.47)
LYMPHOCYTES NFR BLD AUTO: 20 % (ref 14–44)
MCH RBC QN AUTO: 28.4 PG (ref 26.8–34.3)
MCHC RBC AUTO-ENTMCNC: 31.1 G/DL (ref 31.4–37.4)
MCV RBC AUTO: 91 FL (ref 82–98)
MONOCYTES # BLD AUTO: 0.61 THOUSAND/ΜL (ref 0.17–1.22)
MONOCYTES NFR BLD AUTO: 6 % (ref 4–12)
NEUTROPHILS # BLD AUTO: 6.82 THOUSANDS/ΜL (ref 1.85–7.62)
NEUTS SEG NFR BLD AUTO: 71 % (ref 43–75)
NONHDLC SERPL-MCNC: 150 MG/DL
NRBC BLD AUTO-RTO: 0 /100 WBCS
PLATELET # BLD AUTO: 368 THOUSANDS/UL (ref 149–390)
PMV BLD AUTO: 10.1 FL (ref 8.9–12.7)
POTASSIUM SERPL-SCNC: 4 MMOL/L (ref 3.5–5.3)
PROT SERPL-MCNC: 8.4 G/DL (ref 6.4–8.4)
RBC # BLD AUTO: 4.65 MILLION/UL (ref 3.88–5.62)
SODIUM SERPL-SCNC: 137 MMOL/L (ref 135–147)
TRIGL SERPL-MCNC: 197 MG/DL
TSH SERPL DL<=0.05 MIU/L-ACNC: 1.08 UIU/ML (ref 0.45–4.5)
WBC # BLD AUTO: 9.6 THOUSAND/UL (ref 4.31–10.16)

## 2022-08-15 PROCEDURE — 80053 COMPREHEN METABOLIC PANEL: CPT

## 2022-08-15 PROCEDURE — 85025 COMPLETE CBC W/AUTO DIFF WBC: CPT

## 2022-08-15 PROCEDURE — 84443 ASSAY THYROID STIM HORMONE: CPT

## 2022-08-15 PROCEDURE — 80061 LIPID PANEL: CPT

## 2022-08-15 PROCEDURE — 36415 COLL VENOUS BLD VENIPUNCTURE: CPT

## 2022-08-17 ENCOUNTER — OFFICE VISIT (OUTPATIENT)
Dept: FAMILY MEDICINE CLINIC | Facility: CLINIC | Age: 42
End: 2022-08-17
Payer: COMMERCIAL

## 2022-08-17 VITALS
DIASTOLIC BLOOD PRESSURE: 78 MMHG | TEMPERATURE: 97 F | SYSTOLIC BLOOD PRESSURE: 128 MMHG | WEIGHT: 215 LBS | BODY MASS INDEX: 32.58 KG/M2 | HEIGHT: 68 IN | OXYGEN SATURATION: 98 % | HEART RATE: 71 BPM

## 2022-08-17 DIAGNOSIS — E78.2 MIXED HYPERLIPIDEMIA: ICD-10-CM

## 2022-08-17 DIAGNOSIS — I10 ESSENTIAL HYPERTENSION: Primary | ICD-10-CM

## 2022-08-17 DIAGNOSIS — F32.9 REACTIVE DEPRESSION: ICD-10-CM

## 2022-08-17 PROCEDURE — 3725F SCREEN DEPRESSION PERFORMED: CPT | Performed by: FAMILY MEDICINE

## 2022-08-17 PROCEDURE — 99214 OFFICE O/P EST MOD 30 MIN: CPT | Performed by: FAMILY MEDICINE

## 2022-08-17 NOTE — PROGRESS NOTES
BMI Counseling: Body mass index is 32 69 kg/m²  The BMI is above normal  Nutrition recommendations include decreasing portion sizes, decreasing fast food intake, limiting drinks that contain sugar, moderation in carbohydrate intake, increasing intake of lean protein, reducing intake of saturated and trans fat and reducing intake of cholesterol  Exercise recommendations include moderate physical activity 150 minutes/week and exercising 3-5 times per week  No pharmacotherapy was ordered  Rationale for BMI follow-up plan is due to patient being overweight or obese  Assessment/Plan:     Chronic Problems:  No problem-specific Assessment & Plan notes found for this encounter  Visit Diagnosis:  Diagnoses and all orders for this visit:    Essential hypertension  Comments:  Stable continue current medications, low-salt sodium diet    Mixed hyperlipidemia  Comments:  Not at goal, once again stressed the importance of dietary modifications improved choices return to exercise program continue statin    Reactive depression  Comments:  Discussed issues concerns stressed the importance of stressed modification techniques, encouraged to return to exercise program/improved dietary choices          Subjective:    Patient ID: Meghna Llanos is a 36 y o  male      Here for f/u   Starting up company , stress is building   Has been hitting the [employer] , for stress relief  , spends hours on porch of house, writing program  Continues with effexor   Cholesterol continues with Lipitor 20 taking daily negative missed dosing  Diet= admittedly heavy into the stack food poor choices high carbs  Exercise program= not been active at all over the past months secondary to work lack of motivation to leave work  Has available quit mid previous weight lifting  Blood pressure unchanged in a Zestril 20 daily negative missed admittedly would be improved with dietary modifications         The following portions of the patient's history were reviewed and updated as appropriate: allergies, current medications, past family history, past medical history, past social history, past surgical history and problem list     Review of Systems   Constitutional: Negative for appetite change, chills, fever and unexpected weight change  HENT: Negative for congestion, dental problem, ear pain, hearing loss, postnasal drip, rhinorrhea, sinus pressure, sinus pain, sneezing, sore throat, tinnitus and voice change  Eyes: Negative for visual disturbance  Respiratory: Negative for apnea, cough, chest tightness and shortness of breath  Cardiovascular: Negative for chest pain, palpitations and leg swelling  Gastrointestinal: Negative for abdominal pain, blood in stool, constipation, diarrhea, nausea and vomiting  Endocrine: Negative for cold intolerance, heat intolerance, polydipsia, polyphagia and polyuria  Genitourinary: Negative for decreased urine volume, difficulty urinating, dysuria, frequency and hematuria  Musculoskeletal: Negative for arthralgias, back pain, gait problem, joint swelling and myalgias  Skin: Negative for color change, rash and wound  Allergic/Immunologic: Negative for environmental allergies and food allergies  Neurological: Negative for dizziness, syncope, weakness, light-headedness, numbness and headaches  Hematological: Negative for adenopathy  Does not bruise/bleed easily  Psychiatric/Behavioral: Negative for sleep disturbance and suicidal ideas  The patient is not nervous/anxious            /78   Pulse 71   Temp (!) 97 °F (36 1 °C)   Ht 5' 8" (1 727 m)   Wt 97 5 kg (215 lb)   SpO2 98%   BMI 32 69 kg/m²   Social History     Socioeconomic History    Marital status: Single     Spouse name: Not on file    Number of children: Not on file    Years of education: Not on file    Highest education level: Not on file   Occupational History    Occupation: employed   Tobacco Use    Smoking status: Former Smoker    Smokeless tobacco: Never Used   Vaping Use    Vaping Use: Never used   Substance and Sexual Activity    Alcohol use: Yes     Comment: social    Drug use: No    Sexual activity: Not on file   Other Topics Concern    Not on file   Social History Narrative    Always uses seat belt     Daily caffeinated coffee consumption    Lives with family     Social Determinants of Health     Financial Resource Strain: Not on file   Food Insecurity: Not on file   Transportation Needs: Not on file   Physical Activity: Not on file   Stress: Not on file   Social Connections: Not on file   Intimate Partner Violence: Not on file   Housing Stability: Not on file     Past Medical History:   Diagnosis Date    Atypical chest pain     Hyperlipidemia     last assessed 1/17/17    Hypertension      Family History   Problem Relation Age of Onset    Hypertension Mother     Heart disease Father         cardiac d/o    Diabetes Father     Heart attack Brother      No past surgical history on file      Current Outpatient Medications:     atorvastatin (LIPITOR) 20 mg tablet, TAKE 1 TABLET BY MOUTH DAILY AT BEDTIME, Disp: 90 tablet, Rfl: 3    lisinopril (ZESTRIL) 20 mg tablet, TAKE 1 TABLET BY MOUTH EVERY DAY, Disp: 90 tablet, Rfl: 1    Omega-3 Fatty Acids (OMEGA 3 500 PO), Take by mouth daily, Disp: , Rfl:     venlafaxine (EFFEXOR-XR) 37 5 mg 24 hr capsule, TAKE 1 CAPSULE BY MOUTH EVERY DAY, Disp: 90 capsule, Rfl: 1    zolpidem (AMBIEN) 5 mg tablet, Take 1 tablet (5 mg total) by mouth daily at bedtime as needed for sleep, Disp: 30 tablet, Rfl: 0    busPIRone (BUSPAR) 7 5 mg tablet, Take 1 tablet (7 5 mg total) by mouth 2 (two) times a day, Disp: 60 tablet, Rfl: 5    Multiple Vitamin (MULTIVITAMIN) tablet, Take 1 tablet by mouth daily, Disp: , Rfl:     triamcinolone (KENALOG) 0 1 % cream, Apply topically 2 (two) times a day, Disp: 30 g, Rfl: 0    No Known Allergies       Lab Review   Appointment on 08/15/2022   Component Date Value    WBC 08/15/2022 9 60     RBC 08/15/2022 4 65     Hemoglobin 08/15/2022 13 2     Hematocrit 08/15/2022 42 5     MCV 08/15/2022 91     MCH 08/15/2022 28 4     MCHC 08/15/2022 31 1 (A)    RDW 08/15/2022 12 0     MPV 08/15/2022 10 1     Platelets 10/44/7013 368     nRBC 08/15/2022 0     Neutrophils Relative 08/15/2022 71     Immat GRANS % 08/15/2022 0     Lymphocytes Relative 08/15/2022 20     Monocytes Relative 08/15/2022 6     Eosinophils Relative 08/15/2022 2     Basophils Relative 08/15/2022 1     Neutrophils Absolute 08/15/2022 6 82     Immature Grans Absolute 08/15/2022 0 03     Lymphocytes Absolute 08/15/2022 1 89     Monocytes Absolute 08/15/2022 0 61     Eosinophils Absolute 08/15/2022 0 20     Basophils Absolute 08/15/2022 0 05     Sodium 08/15/2022 137     Potassium 08/15/2022 4 0     Chloride 08/15/2022 106     CO2 08/15/2022 24     ANION GAP 08/15/2022 7     BUN 08/15/2022 9     Creatinine 08/15/2022 0 77     Glucose, Fasting 08/15/2022 92     Calcium 08/15/2022 9 0     AST 08/15/2022 38     ALT 08/15/2022 70     Alkaline Phosphatase 08/15/2022 94     Total Protein 08/15/2022 8 4     Albumin 08/15/2022 4 2     Total Bilirubin 08/15/2022 0 46     eGFR 08/15/2022 111     TSH 3RD GENERATON 08/15/2022 1 080     Cholesterol 08/15/2022 191     Triglycerides 08/15/2022 197 (A)    HDL, Direct 08/15/2022 41     LDL Calculated 08/15/2022 111 (A)    Non-HDL-Chol (CHOL-HDL) 08/15/2022 150    Hospital Outpatient Visit on 04/06/2022   Component Date Value    A4C EF 04/06/2022 55     LVIDd 04/06/2022 4 00     LVIDS 04/06/2022 2 70     IVSd 04/06/2022 1 10     LVPWd 04/06/2022 1 10     FS 04/06/2022 33     MV E' Tissue Velocity Se* 04/06/2022 11     E wave deceleration time 04/06/2022 154     MV Peak E Lebron 04/06/2022 83     MV Peak A Lebron 04/06/2022 0 84     AV LVOT peak gradient 04/06/2022 5     RVID d 04/06/2022 2 8     LA size 04/06/2022 3 9     LA length (A2C) 04/06/2022 5 70     ROBYN A4C 04/06/2022 15 9     RA 2D Volume 04/06/2022 18 0     RAA A4C 04/06/2022 10     AV peak gradient 04/06/2022 8     MV stenosis pressure 1/2* 04/06/2022 45     MV valve area p 1/2 meth* 04/06/2022 4 89     MR PG 04/06/2022 25     Ao root 04/06/2022 3 40     Asc Ao 04/06/2022 3 2     Mitral regurgitation pea* 04/06/2022 2 50     Left ventricular stroke * 04/06/2022 42 00     IVS 04/06/2022 1 1     LEFT VENTRICLE SYSTOLIC * 78/45/7569 28     LV DIASTOLIC VOLUME (MOD* 92/41/8450 70     Left Atrium Area-systoli* 04/06/2022 16 1     Left Atrium Area-systoli* 04/06/2022 19 4     LVSV, 2D 04/06/2022 42     Ao asc z-score 04/06/2022 1 93     ZLVPWD 04/06/2022 2 52     ZLVIDS 04/06/2022 -4 64     ZLVIDD 04/06/2022 -6 95     ZIVSD 04/06/2022 2 41         Imaging: No results found  Objective:     Physical Exam  Constitutional:       General: He is not in acute distress  Appearance: He is well-developed  He is not ill-appearing  HENT:      Head: Normocephalic and atraumatic  Cardiovascular:      Rate and Rhythm: Normal rate  Pulmonary:      Effort: Pulmonary effort is normal    Musculoskeletal:         General: Normal range of motion  Cervical back: Normal range of motion and neck supple  Skin:     General: Skin is warm and dry  Neurological:      Mental Status: He is alert and oriented to person, place, and time  Deep Tendon Reflexes: Reflexes are normal and symmetric  Psychiatric:         Mood and Affect: Mood normal          Behavior: Behavior normal          Thought Content: Thought content normal          Judgment: Judgment normal            There are no Patient Instructions on file for this visit  PITA Lomeli    Portions of the record may have been created with voice recognition software  Occasional wrong word or "sound a like" substitutions may have occurred due to the inherent limitations of voice recognition software    Read the chart carefully and recognize, using context, where substitutions have occurred

## 2022-08-23 DIAGNOSIS — R77.9 ELEVATED SERUM PROTEIN LEVEL: Primary | ICD-10-CM

## 2022-09-13 DIAGNOSIS — E78.2 MIXED HYPERLIPIDEMIA: ICD-10-CM

## 2022-09-13 RX ORDER — ATORVASTATIN CALCIUM 20 MG/1
TABLET, FILM COATED ORAL
Qty: 90 TABLET | Refills: 3 | Status: SHIPPED | OUTPATIENT
Start: 2022-09-13

## 2022-09-18 DIAGNOSIS — I10 ESSENTIAL HYPERTENSION: ICD-10-CM

## 2022-09-18 RX ORDER — LISINOPRIL 20 MG/1
TABLET ORAL
Qty: 90 TABLET | Refills: 1 | Status: SHIPPED | OUTPATIENT
Start: 2022-09-18

## 2022-09-26 ENCOUNTER — TELEMEDICINE (OUTPATIENT)
Dept: FAMILY MEDICINE CLINIC | Facility: CLINIC | Age: 42
End: 2022-09-26
Payer: COMMERCIAL

## 2022-09-26 DIAGNOSIS — U07.1 COVID-19: Primary | ICD-10-CM

## 2022-09-26 PROCEDURE — 99213 OFFICE O/P EST LOW 20 MIN: CPT | Performed by: FAMILY MEDICINE

## 2022-09-26 RX ORDER — NIRMATRELVIR AND RITONAVIR 300-100 MG
3 KIT ORAL 2 TIMES DAILY
Qty: 30 TABLET | Refills: 0 | Status: SHIPPED | OUTPATIENT
Start: 2022-09-26 | End: 2022-10-01

## 2022-09-26 NOTE — PROGRESS NOTES
COVID-19 Outpatient Progress Note    Assessment/Plan:    Problem List Items Addressed This Visit    None     Visit Diagnoses     COVID-19    -  Primary         Disposition:     Patient with moderate or severe illness or has a weakened immune system  They should isolate from others through at least day 10  Isolation can be ended if symptoms are improving and they are fever free for the past 24 hours  If they still have fever or other symptoms have not improved, continue to isolate until they improve  Regardless of when you isolation is ended, avoid being around people who are more likely to get very sick from COVID-19 until at least day 11  Discussed symptom directed medication options with patient  Discussed vitamin D, vitamin C, and/or zinc supplementation with patient  Patient meets criteria for PAXLOVID and they have been counseled appropriately according to EUA documentation released by the FDA  After discussion, patient agrees to treatment  Parul Mantle is an investigational medicine used to treat mild-to-moderate COVID-19 in adults and children (15years of age and older weighing at least 80 pounds (40 kg)) with positive results of direct SARS-CoV-2 viral testing, and who are at high risk for progression to severe COVID-19, including hospitalization or death  PAXLOVID is investigational because it is still being studied  There is limited information about the safety and effectiveness of using PAXLOVID to treat people with mild-to-moderate COVID-19  The FDA has authorized the emergency use of PAXLOVID for the treatment of mild-tomoderate COVID-19 in adults and children (15years of age and older weighing at least 80 pounds (40 kg)) with a positive test for the virus that causes COVID-19, and who are at high risk for progression to severe COVID-19, including hospitalization or death, under an EUA  What should I tell my healthcare provider before I take PAXLOVID?     Tell your healthcare provider if you:  - Have any allergies  - Have liver or kidney disease  - Are pregnant or plan to become pregnant  - Are breastfeeding a child  - Have any serious illnesses    Tell your healthcare provider about all the medicines you take, including prescription and over-the-counter medicines, vitamins, and herbal supplements  Some medicines may interact with PAXLOVID and may cause serious side effects  Keep a list of your medicines to show your healthcare provider and pharmacist when you get a new medicine  You can ask your healthcare provider or pharmacist for a list of medicines that interact with PAXLOVID  Do not start taking a new medicine without telling your healthcare provider  Your healthcare provider can tell you if it is safe to take PAXLOVID with other medicines  Tell your healthcare provider if you are taking combined hormonal contraceptive  PAXLOVID may affect how your birth control pills work  Females who are able to become pregnant should use another effective alternative form of contraception or an additional barrier method of contraception  Talk to your healthcare provider if you have any questions about contraceptive methods that might be right for you  How do I take PAXLOVID? PAXLOVID consists of 2 medicines: nirmatrelvir and ritonavir  - Take 2 pink tablets of nirmatrelvir with 1 white tablet of ritonavir by mouth 2 times each day (in the morning and in the evening) for 5 days  For each dose, take all 3 tablets at the same time  - If you have kidney disease, talk to your healthcare provider  You may need a different dose  - Swallow the tablets whole  Do not chew, break, or crush the tablets  - Take PAXLOVID with or without food  - Do not stop taking PAXLOVID without talking to your healthcare provider, even if you feel better  - If you miss a dose of PAXLOVID within 8 hours of the time it is usually taken, take it as soon as you remember   If you miss a dose by more than 8 hours, skip the missed dose and take the next dose at your regular time  Do not take 2 doses of PAXLOVID at the same time  - If you take too much PAXLOVID, call your healthcare provider or go to the nearest hospital emergency room right away  - If you are taking a ritonavir- or cobicistat-containing medicine to treat hepatitis C or Human Immunodeficiency Virus (HIV), you should continue to take your medicine as prescribed by your healthcare provider   - Talk to your healthcare provider if you do not feel better or if you feel worse after 5 days  Who should generally not take PAXLOVID? Do not take PAXLOVID if:  You are allergic to nirmatrelvir, ritonavir, or any of the ingredients in PAXLOVID  You are taking any of the following medicines:  - Alfuzosin  - Pethidine, piroxicam, propoxyphene  - Ranolazine  - Amiodarone, dronedarone, flecainide, propafenone, quinidine  - Colchicine  - Lurasidone, pimozide, clozapine  - Dihydroergotamine, ergotamine, methylergonovine  - Lovastatin, simvastatin  - Sildenafil (Revatio®) for pulmonary arterial hypertension (PAH)  - Triazolam, oral midazolam  - Apalutamide  - Carbamazepine, phenobarbital, phenytoin  - Rifampin  - St  Krishans Wort (hypericum perforatum)    What are the important possible side effects of PAXLOVID? Possible side effects of PAXLOVID are:  - Liver Problems  Tell your healthcare provider right away if you have any of these signs and symptoms of liver problems: loss of appetite, yellowing of your skin and the whites of eyes (jaundice), dark-colored urine, pale colored stools and itchy skin, stomach area (abdominal) pain  - Resistance to HIV Medicines  If you have untreated HIV infection, PAXLOVID may lead to some HIV medicines not working as well in the future  - Other possible side effects include: altered sense of taste, diarrhea, high blood pressure, or muscle aches    These are not all the possible side effects of PAXLOVID   Not many people have taken PAXLOVID  Serious and unexpected side effects may happen  [place] is still being studied, so it is possible that all of the risks are not known at this time  What other treatment choices are there? Like [place] may allow for the emergency use of other medicines to treat people with COVID-19  Go to https://[facility]/ for information on the emergency use of other medicines that are authorized by FDA to treat people with COVID-19  Your healthcare provider may talk with you about clinical trials for which you may be eligible  It is your choice to be treated or not to be treated with PAXLOVID  Should you decide not to receive it or for your child not to receive it, it will not change your standard medical care  What if I am pregnant or breastfeeding? There is no experience treating pregnant women or breastfeeding mothers with PAXLOVID  For a mother and unborn baby, the benefit of taking PAXLOVID may be greater than the risk from the treatment  If you are pregnant, discuss your options and specific situation with your healthcare provider  It is recommended that you use effective barrier contraception or do not have sexual activity while taking PAXLOVID  If you are breastfeeding, discuss your options and specific situation with your healthcare provider  How do I report side effects with PAXLOVID? Contact your healthcare provider if you have any side effects that bother you or do not go away  Report side effects to FDA MedWatch at www fda gov/medwatch or call 5-011-IGQ0480 or you can report side effects to [facility]  at the contact information provided below  Website Fax number Telephone number   [employer] [phone]     How should I store [place]? Store PAXLOVID tablets at room temperature between 68°F to 77°F (20°C to 25°C)      Full fact sheet for patients, parents, and caregivers can be found at: [facility]    I have spent 20 minutes directly with the patient  Greater than 50% of this time was spent in counseling/coordination of care regarding: prognosis, risks and benefits of treatment options, instructions for management, patient and family education, risk factor reductions and impressions  Encounter provider: PITA Miller     Provider [facility] at[facility] [address] [facility] [address] [facility] [facility]  [address] [place] [address] [address] [facility]  [phone]     Recent Visits  No visits were found meeting these conditions  Showing recent visits within past 7 days and meeting all other requirements  Future Appointments  No visits were found meeting these conditions  Showing future appointments within next 150 days and meeting all other requirements     This virtual check-in was done via [employer] and patient was informed that this is a secure, HIPAA-compliant platform  He agrees to proceed  Patient agrees to participate in a virtual check in via telephone or video visit instead of presenting to the office to address urgent/immediate medical needs  Patient is aware this is a billable service  He acknowledged consent and understanding of privacy and security of the video platform  The patient has agreed to participate and understands they can discontinue the visit at any time  After connecting through [facility], the patient was identified by name and date of birth  [email or web address] was informed that this was a telemedicine visit and that the exam was being conducted confidentially over secure lines  My office door was closed  No one else was in the room  [facility] acknowledged consent and understanding of privacy and security of the telemedicine visit   I informed the patient that I have reviewed his record in Epic and presented the opportunity for him to ask any questions regarding the visit today  The patient agreed to participate  Verification of patient location:  Patient is located in the following state in which I hold an active license: [place]    Subjective:   Robson Burr is a 36 y o  male who has been screened for COVID-19  Symptom change since last report: unchanged  Patient's symptoms include fever, chills, fatigue, nasal congestion, rhinorrhea and myalgias  - Date of symptom onset: 9/24/2022  - Date of positive COVID-19 test: 9/25/2022  Type of test: Home antigen  Patient with typical symptoms of COVID-19 and they attest that they were positive on home rapid antigen testing  Image of positive result is not able to be uploaded into their chart  COVID-19 vaccination status: Fully vaccinated (primary series)    Eduar Mcdonald has been staying home and has isolated themselves in his home  He is taking care to not share personal items and is cleaning all surfaces that are touched often, like counters, tabletops, and doorknobs using household cleaning sprays or wipes  He is wearing a mask when he leaves his room  Lab Results   Component Value Date    SARSCOV2 Negative 01/09/2022       Review of Systems   Constitutional: Positive for chills, fatigue and fever  HENT: Positive for congestion and rhinorrhea  Musculoskeletal: Positive for myalgias       Current Outpatient Medications on File Prior to Visit   Medication Sig    atorvastatin (LIPITOR) 20 mg tablet TAKE 1 TABLET BY MOUTH DAILY AT BEDTIME    busPIRone (BUSPAR) 7 5 mg tablet Take 1 tablet (7 5 mg total) by mouth 2 (two) times a day    lisinopril (ZESTRIL) 20 mg tablet TAKE 1 TABLET BY MOUTH EVERY DAY    Multiple Vitamin (MULTIVITAMIN) tablet Take 1 tablet by mouth daily    Omega-3 Fatty Acids (OMEGA 3 500 PO) Take by mouth daily    triamcinolone (KENALOG) 0 1 % cream Apply topically 2 (two) times a day    venlafaxine (EFFEXOR-XR) 37 5 mg 24 hr capsule TAKE 1 CAPSULE BY MOUTH EVERY DAY    zolpidem (AMBIEN) 5 mg tablet Take 1 tablet (5 mg total) by mouth daily at bedtime as needed for sleep       Objective: There were no vitals taken for this visit  Physical Exam  Constitutional:       General: He is not in acute distress  Appearance: He is not ill-appearing  HENT:      Head: Normocephalic and atraumatic  Pulmonary:      Effort: Pulmonary effort is normal    Musculoskeletal:      Cervical back: Normal range of motion  Neurological:      Mental Status: He is oriented to person, place, and time         PITA Harris

## 2022-11-03 ENCOUNTER — CLINICAL SUPPORT (OUTPATIENT)
Dept: FAMILY MEDICINE CLINIC | Facility: CLINIC | Age: 42
End: 2022-11-03

## 2022-11-03 ENCOUNTER — TELEPHONE (OUTPATIENT)
Dept: FAMILY MEDICINE CLINIC | Facility: CLINIC | Age: 42
End: 2022-11-03

## 2022-11-03 VITALS — SYSTOLIC BLOOD PRESSURE: 122 MMHG | HEART RATE: 96 BPM | OXYGEN SATURATION: 97 % | DIASTOLIC BLOOD PRESSURE: 72 MMHG

## 2022-11-03 DIAGNOSIS — Z01.30 BLOOD PRESSURE CHECK: Primary | ICD-10-CM

## 2022-11-03 NOTE — TELEPHONE ENCOUNTER
Raman Preciado took Venlafaxine, atorvastatin,and lisinopril twice today in 2 hrs apart  He took Thursday than Friday

## 2022-12-28 ENCOUNTER — HOSPITAL ENCOUNTER (OUTPATIENT)
Dept: RADIOLOGY | Facility: HOSPITAL | Age: 42
Discharge: HOME | End: 2022-12-28

## 2022-12-28 ENCOUNTER — OFFICE VISIT (OUTPATIENT)
Dept: FAMILY MEDICINE CLINIC | Facility: CLINIC | Age: 42
End: 2022-12-28

## 2022-12-28 VITALS
HEIGHT: 68 IN | HEART RATE: 81 BPM | OXYGEN SATURATION: 97 % | DIASTOLIC BLOOD PRESSURE: 78 MMHG | TEMPERATURE: 97.6 F | SYSTOLIC BLOOD PRESSURE: 128 MMHG | WEIGHT: 216 LBS | BODY MASS INDEX: 32.74 KG/M2

## 2022-12-28 DIAGNOSIS — R06.02 SOB (SHORTNESS OF BREATH): ICD-10-CM

## 2022-12-28 DIAGNOSIS — F41.9 ANXIETY: ICD-10-CM

## 2022-12-28 DIAGNOSIS — R06.02 SOB (SHORTNESS OF BREATH): Primary | ICD-10-CM

## 2022-12-28 DIAGNOSIS — I10 ESSENTIAL HYPERTENSION: ICD-10-CM

## 2022-12-28 DIAGNOSIS — E78.2 MIXED HYPERLIPIDEMIA: ICD-10-CM

## 2022-12-28 NOTE — PROGRESS NOTES
Assessment/Plan:     Chronic Problems:  No problem-specific Assessment & Plan notes found for this encounter  Visit Diagnosis:  Diagnoses and all orders for this visit:    SOB (shortness of breath)  -     XR chest pa & lateral; Future    Anxiety  Comments:  Discussed patient's concerns, reviewed medications to be continued discussed self-help measures    Essential hypertension  Comments:  Stable continue current vent    Mixed hyperlipidemia  Comments: Tolerating statin to be continued    Reviewed issues and concerns, presentation most likely will be panic attack history discussed issues with current, reviewed occasions,      Subjective:    Patient ID: Chip Garcia is a 36 y o  male  Sob for the past 2 wks   Began after smoking weed , has since stopped the weed   Has not changed any activity , denies any intolerance , neg cp palpitations   Admittedly was smoking a lot of weed secondary to stress/ anxiety , but intake became more of a habit , therefore has diminished intake dramatically   Blood pressure continues monitoring numbers continue current medications negative next dose  Cholesterol continues on statin denies any issues with current medications times myalgias continue continues    Shortness of Breath  The current episode started 1 to 4 weeks ago  The problem occurs constantly  Pertinent negatives include no chest pain, coughing, dizziness, leg swelling, palpitations, rhinorrhea, sore throat, stridor or wheezing  The following portions of the patient's history were reviewed and updated as appropriate: allergies, current medications, past family history, past medical history, past social history, past surgical history and problem list     Review of Systems   Constitutional: Negative for appetite change, chills, fever and unexpected weight change     HENT: Negative for congestion, dental problem, ear pain, hearing loss, postnasal drip, rhinorrhea, sinus pressure, sinus pain, sneezing, sore throat, tinnitus and voice change  Eyes: Negative for visual disturbance  Respiratory: Positive for shortness of breath  Negative for apnea, cough, chest tightness, wheezing and stridor  Cardiovascular: Negative for chest pain, palpitations and leg swelling  Gastrointestinal: Negative for abdominal pain, blood in stool, constipation, diarrhea, nausea and vomiting  Endocrine: Negative for cold intolerance, heat intolerance, polydipsia, polyphagia and polyuria  Genitourinary: Negative for decreased urine volume, difficulty urinating, dysuria, frequency and hematuria  Musculoskeletal: Negative for arthralgias, back pain, gait problem, joint swelling and myalgias  Skin: Negative for color change, rash and wound  Allergic/Immunologic: Negative for environmental allergies and food allergies  Neurological: Negative for dizziness, syncope, weakness, light-headedness, numbness and headaches  Hematological: Negative for adenopathy  Does not bruise/bleed easily  Psychiatric/Behavioral: Negative for sleep disturbance and suicidal ideas  The patient is nervous/anxious            /78   Pulse 81   Temp 97 6 °F (36 4 °C)   Ht 5' 8" (1 727 m)   Wt 98 kg (216 lb)   SpO2 97%   BMI 32 84 kg/m²   Social History     Socioeconomic History   • Marital status: Single     Spouse name: Not on file   • Number of children: Not on file   • Years of education: Not on file   • Highest education level: Not on file   Occupational History   • Occupation: employed   Tobacco Use   • Smoking status: Former   • Smokeless tobacco: Never   Vaping Use   • Vaping Use: Never used   Substance and Sexual Activity   • Alcohol use: Yes     Comment: social   • Drug use: No   • Sexual activity: Not on file   Other Topics Concern   • Not on file   Social History Narrative    Always uses seat belt     Daily caffeinated coffee consumption    Lives with family     Social Determinants of Health     Financial Resource Strain: Not on file   Food Insecurity: Not on file   Transportation Needs: Not on file   Physical Activity: Not on file   Stress: Not on file   Social Connections: Not on file   Intimate Partner Violence: Not on file   Housing Stability: Not on file     Past Medical History:   Diagnosis Date   • Atypical chest pain    • Hyperlipidemia     last assessed 1/17/17   • Hypertension      Family History   Problem Relation Age of Onset   • Hypertension Mother    • Heart disease Father         cardiac d/o   • Diabetes Father    • Heart attack Brother      No past surgical history on file  Current Outpatient Medications:   •  atorvastatin (LIPITOR) 20 mg tablet, TAKE 1 TABLET BY MOUTH DAILY AT BEDTIME, Disp: 90 tablet, Rfl: 3  •  busPIRone (BUSPAR) 7 5 mg tablet, Take 1 tablet (7 5 mg total) by mouth 2 (two) times a day, Disp: 60 tablet, Rfl: 5  •  lisinopril (ZESTRIL) 20 mg tablet, TAKE 1 TABLET BY MOUTH EVERY DAY, Disp: 90 tablet, Rfl: 1  •  Multiple Vitamin (MULTIVITAMIN) tablet, Take 1 tablet by mouth daily, Disp: , Rfl:   •  Omega-3 Fatty Acids (OMEGA 3 500 PO), Take by mouth daily, Disp: , Rfl:   •  triamcinolone (KENALOG) 0 1 % cream, Apply topically 2 (two) times a day, Disp: 30 g, Rfl: 0  •  venlafaxine (EFFEXOR-XR) 37 5 mg 24 hr capsule, TAKE 1 CAPSULE BY MOUTH EVERY DAY, Disp: 90 capsule, Rfl: 1  •  zolpidem (AMBIEN) 5 mg tablet, Take 1 tablet (5 mg total) by mouth daily at bedtime as needed for sleep, Disp: 30 tablet, Rfl: 0    No Known Allergies       Lab Review   No visits with results within 2 Month(s) from this visit     Latest known visit with results is:   Appointment on 08/15/2022   Component Date Value   • WBC 08/15/2022 9 60    • RBC 08/15/2022 4 65    • Hemoglobin 08/15/2022 13 2    • Hematocrit 08/15/2022 42 5    • MCV 08/15/2022 91    • MCH 08/15/2022 28 4    • MCHC 08/15/2022 31 1 (L)    • RDW 08/15/2022 12 0    • MPV 08/15/2022 10 1    • Platelets 37/84/1387 368    • nRBC 08/15/2022 0    • Neutrophils Relative 08/15/2022 71    • Immat GRANS % 08/15/2022 0    • Lymphocytes Relative 08/15/2022 20    • Monocytes Relative 08/15/2022 6    • Eosinophils Relative 08/15/2022 2    • Basophils Relative 08/15/2022 1    • Neutrophils Absolute 08/15/2022 6 82    • Immature Grans Absolute 08/15/2022 0 03    • Lymphocytes Absolute 08/15/2022 1 89    • Monocytes Absolute 08/15/2022 0 61    • Eosinophils Absolute 08/15/2022 0 20    • Basophils Absolute 08/15/2022 0 05    • Sodium 08/15/2022 137    • Potassium 08/15/2022 4 0    • Chloride 08/15/2022 106    • CO2 08/15/2022 24    • ANION GAP 08/15/2022 7    • BUN 08/15/2022 9    • Creatinine 08/15/2022 0 77    • Glucose, Fasting 08/15/2022 92    • Calcium 08/15/2022 9 0    • AST 08/15/2022 38    • ALT 08/15/2022 70    • Alkaline Phosphatase 08/15/2022 94    • Total Protein 08/15/2022 8 4    • Albumin 08/15/2022 4 2    • Total Bilirubin 08/15/2022 0 46    • eGFR 08/15/2022 111    • TSH 3RD GENERATON 08/15/2022 1 080    • Cholesterol 08/15/2022 191    • Triglycerides 08/15/2022 197 (H)    • HDL, Direct 08/15/2022 41    • LDL Calculated 08/15/2022 111 (H)    • Non-HDL-Chol (CHOL-HDL) 08/15/2022 150         Imaging: No results found  Objective:     Physical Exam  Constitutional:       General: He is not in acute distress  Appearance: He is well-developed  He is not ill-appearing or toxic-appearing  HENT:      Head: Normocephalic and atraumatic  Cardiovascular:      Rate and Rhythm: Normal rate and regular rhythm  Heart sounds: Normal heart sounds  Pulmonary:      Effort: Pulmonary effort is normal       Breath sounds: Normal breath sounds  No decreased breath sounds  Chest:      Chest wall: No mass or tenderness  Abdominal:      General: Bowel sounds are normal       Palpations: Abdomen is soft  There is no splenomegaly  Tenderness: There is no abdominal tenderness  Musculoskeletal:         General: Normal range of motion        Cervical back: Normal range of motion and neck supple  Right lower leg: No edema  Left lower leg: No edema  Skin:     General: Skin is warm and dry  Coloration: Skin is not cyanotic or pale  Findings: No rash  Nails: There is no clubbing  Neurological:      Mental Status: He is alert and oriented to person, place, and time  Deep Tendon Reflexes: Reflexes are normal and symmetric  Psychiatric:         Behavior: Behavior normal          Thought Content: Thought content normal          Judgment: Judgment normal            There are no Patient Instructions on file for this visit  PITA Shepard    Portions of the record may have been created with voice recognition software  Occasional wrong word or "sound a like" substitutions may have occurred due to the inherent limitations of voice recognition software  Read the chart carefully and recognize, using context, where substitutions have occurred

## 2023-01-18 ENCOUNTER — OFFICE VISIT (OUTPATIENT)
Dept: CARDIOLOGY CLINIC | Facility: CLINIC | Age: 43
End: 2023-01-18

## 2023-01-18 VITALS
BODY MASS INDEX: 32.58 KG/M2 | OXYGEN SATURATION: 97 % | HEART RATE: 86 BPM | WEIGHT: 215 LBS | HEIGHT: 68 IN | SYSTOLIC BLOOD PRESSURE: 128 MMHG | DIASTOLIC BLOOD PRESSURE: 78 MMHG | RESPIRATION RATE: 16 BRPM

## 2023-01-18 DIAGNOSIS — E78.00 PURE HYPERCHOLESTEROLEMIA: ICD-10-CM

## 2023-01-18 DIAGNOSIS — I10 PRIMARY HYPERTENSION: ICD-10-CM

## 2023-01-18 DIAGNOSIS — R06.09 DYSPNEA ON EXERTION: Primary | ICD-10-CM

## 2023-01-18 NOTE — PATIENT INSTRUCTIONS
Continue on medications  Emotional support given about recent loss of his brother  Given patient's symptoms of dyspnea on exertion, multiple risk factors for CAD we will arrange for regular exercise stress test to assess for ischemia  Patient advised to report any exertional symptoms  Continue on medications  Follow-up with PCP  Continue all medications  Previous studies reviewed with patient, medications reviewed and possible side effects discussed  Continue risk factor modification  Optimize weight, regular exercise and follow up with appropriate specialists and primary care physician as discussed  All questions answered  Patient advised to report any problems prompting to medical attention   Return for follow up visit in 4-6 months or earlier if needed

## 2023-01-18 NOTE — PROGRESS NOTES
[facility] [address] [address]  Cardiology Follow Up    Isac Bhattcindy  1980  [id number]    1  Dyspnea on exertion  Stress test only, exercise      2  Primary hypertension        3  Pure hypercholesterolemia            Discussion/Summary:  1  Dyspnea on exertion and patient with strong family history, hypertension, hyperlipidemia  We will arrange for regular walking stress test to assess for ischemia  Risk and benefits reviewed  Patient advised that stress test is a good to about 00% certainty  If patient's symptoms continue would need to consider further ischemic evaluation    2  Hypertension, stable 128/78, continue with lisinopril 20    3  Hyperlipidemia currently on Lipitor 20, recent blood work done through primary care physician shows LDL at 111    Continue on medications  Emotional support given about recent loss of his brother  Given patient's symptoms of dyspnea on exertion, multiple risk factors for CAD we will arrange for regular exercise stress test to assess for ischemia  Patient advised to report any exertional symptoms  Continue on medications  Follow-up with PCP  Continue all medications  Previous studies reviewed with patient, medications reviewed and possible side effects discussed  Continue risk factor modification  Optimize weight, regular exercise and follow up with appropriate specialists and primary care physician as discussed  All questions answered  Patient advised to report any problems prompting to medical attention  Return for follow up visit in 4-6 months or earlier if needed    Chief Complaint   Patient presents with   • Follow-up       Interval History: Patient presents for routine cardiac follow-up visit with history of hypertension, hyperlipidemia  Patient states since his last visit his brother age 48 unfortunately  from massive heart attack while shoveling snow    Patient states he is very nervous and anxious since this happened  Patient notes he has been having shortness of breath with exertion  Patient states he walks up 1 flight of stairs and feels short of breath  Patient denies any chest pain, chest pressure, chest heaviness but denies any syncope, dizziness  Patient states he is taking her medications as prescribed  Denies needing any refills  Last echo April 2022, EF 60%, trace MR/TR  Last stress test June 2018, regular stress test--patient walked for 13 minutes, went to stage 5, reached 17 2 METS, 100% of age-predicted maximum heart rate, study was found to be normal without reproduction of symptoms      Patient Active Problem List   Diagnosis   • Mixed hyperlipidemia   • Essential hypertension   • Chest pain due to myocardial ischemia   • Family history of premature CAD   • Depression     Past Medical History:   Diagnosis Date   • Atypical chest pain    • Hyperlipidemia     last assessed 1/17/17   • Hypertension      Social History     Socioeconomic History   • Marital status: Single     Spouse name: Not on file   • Number of children: Not on file   • Years of education: Not on file   • Highest education level: Not on file   Occupational History   • Occupation: employed   Tobacco Use   • Smoking status: Former   • Smokeless tobacco: Never   Vaping Use   • Vaping Use: Never used   Substance and Sexual Activity   • Alcohol use: Yes     Comment: social   • Drug use: No   • Sexual activity: Not on file   Other Topics Concern   • Not on file   Social History Narrative    Always uses seat belt     Daily caffeinated coffee consumption    Lives with family     Social Determinants of Health     Financial Resource Strain: Not on file   Food Insecurity: Not on file   Transportation Needs: Not on file   Physical Activity: Not on file   Stress: Not on file   Social Connections: Not on file   Intimate Partner Violence: Not on file   Housing Stability: Not on file      Family History   Problem Relation Age of Onset   • Hypertension Mother    • Heart disease Father         cardiac d/o   • Diabetes Father    • Heart attack Brother      History reviewed  No pertinent surgical history  Current Outpatient Medications:   •  atorvastatin (LIPITOR) 20 mg tablet, TAKE 1 TABLET BY MOUTH DAILY AT BEDTIME, Disp: 90 tablet, Rfl: 3  •  lisinopril (ZESTRIL) 20 mg tablet, TAKE 1 TABLET BY MOUTH EVERY DAY, Disp: 90 tablet, Rfl: 1  •  venlafaxine (EFFEXOR-XR) 37 5 mg 24 hr capsule, TAKE 1 CAPSULE BY MOUTH EVERY DAY, Disp: 90 capsule, Rfl: 1  •  zolpidem (AMBIEN) 5 mg tablet, Take 1 tablet (5 mg total) by mouth daily at bedtime as needed for sleep, Disp: 30 tablet, Rfl: 0  No Known Allergies      Imaging: XR chest pa & lateral    Result Date: 12/28/2022  Narrative: CHEST INDICATION:   R06 02: Shortness of breath  COMPARISON:  None EXAM PERFORMED/VIEWS:  XR CHEST PA & LATERAL FINDINGS: Cardiomediastinal silhouette appears unremarkable  The lungs are clear  No pneumothorax or pleural effusion  Osseous structures appear within normal limits for patient age  No free air in the upper abdomen  Impression: No acute cardiopulmonary disease  Workstation performed: [id number]       Review of Systems:  Review of Systems   Respiratory: Positive for shortness of breath  Cardiovascular: Negative  Neurological: Negative  Hematological: Negative  Psychiatric/Behavioral: Negative  All other systems reviewed and are negative  /78 (BP Location: Left arm, Patient Position: Sitting, Cuff Size: Standard)   Pulse 86   Resp 16   Ht 5' 8" (1 727 m)   Wt 97 5 kg (215 lb)   SpO2 97%   BMI 32 69 kg/m²     Physical Exam:  Physical Exam  Vitals and nursing note reviewed  Constitutional:       Appearance: Normal appearance  HENT:      Head: Normocephalic and atraumatic  Cardiovascular:      Rate and Rhythm: Normal rate and regular rhythm  Pulses: Normal pulses  Heart sounds: Normal heart sounds     Pulmonary: Breath sounds: Normal breath sounds  Musculoskeletal:         General: Normal range of motion  Cervical back: Normal range of motion and neck supple  Skin:     General: Skin is warm and dry  Neurological:      General: No focal deficit present  Mental Status: He is alert and oriented to person, place, and time  Psychiatric:         Mood and Affect: Mood normal          Behavior: Behavior normal          Thought Content:  Thought content normal          Judgment: Judgment normal

## 2023-01-20 DIAGNOSIS — F32.A DEPRESSION, UNSPECIFIED DEPRESSION TYPE: ICD-10-CM

## 2023-01-20 RX ORDER — VENLAFAXINE HYDROCHLORIDE 37.5 MG/1
CAPSULE, EXTENDED RELEASE ORAL
Qty: 90 CAPSULE | Refills: 1 | Status: SHIPPED | OUTPATIENT
Start: 2023-01-20

## 2023-02-01 ENCOUNTER — TELEPHONE (OUTPATIENT)
Dept: CARDIOLOGY CLINIC | Facility: CLINIC | Age: 43
End: 2023-02-01

## 2023-02-01 ENCOUNTER — HOSPITAL ENCOUNTER (OUTPATIENT)
Dept: NON INVASIVE DIAGNOSTICS | Facility: CLINIC | Age: 43
Discharge: HOME | End: 2023-02-01

## 2023-02-01 VITALS
HEIGHT: 68 IN | SYSTOLIC BLOOD PRESSURE: 152 MMHG | DIASTOLIC BLOOD PRESSURE: 98 MMHG | OXYGEN SATURATION: 99 % | WEIGHT: 215 LBS | HEART RATE: 84 BPM | BODY MASS INDEX: 32.58 KG/M2

## 2023-02-01 DIAGNOSIS — R06.09 DYSPNEA ON EXERTION: Primary | ICD-10-CM

## 2023-02-01 DIAGNOSIS — R94.31 ABNORMAL ECG DURING EXERCISE STRESS TEST: ICD-10-CM

## 2023-02-01 DIAGNOSIS — R06.09 DYSPNEA ON EXERTION: ICD-10-CM

## 2023-02-01 LAB
MAX HR PERCENT: 92 %
MAX HR: 164 BPM
RATE PRESSURE PRODUCT: NORMAL
SL CV STRESS RECOVERY BP: NORMAL MMHG
SL CV STRESS RECOVERY HR: 110 BPM
SL CV STRESS RECOVERY O2 SAT: 99 %
SL CV STRESS STAGE REACHED: 4
STRESS ANGINA INDEX: 0
STRESS BASELINE BP: NORMAL MMHG
STRESS BASELINE HR: 84 BPM
STRESS O2 SAT REST: 99 %
STRESS PEAK HR: 164 BPM
STRESS POST ESTIMATED WORKLOAD: 13.4 METS
STRESS POST EXERCISE DUR MIN: 10 MIN
STRESS POST EXERCISE DUR SEC: 1 SEC
STRESS POST O2 SAT PEAK: 97 %
STRESS POST PEAK BP: 188 MMHG

## 2023-02-01 NOTE — TELEPHONE ENCOUNTER
----- Message from Jorge Mario PA-C sent at 2/1/2023  3:37 PM EST -----  I have called and discussed the results with the patient  Explained he needs another test, I have ordered stress echo  Please print this out and sent down to testing to get it scheduled in the next week or so    Patient is aware they will be calling          ----- Message -----  From: Jonathan Barnes MD  Sent: 2/1/2023   2:14 PM EST  To: Jorge Mario PA-C

## 2023-02-02 LAB
CHEST PAIN STATEMENT: NORMAL
CHEST PAIN STATEMENT: NORMAL
MAX DIASTOLIC BP: 94 MMHG
MAX DIASTOLIC BP: 94 MMHG
MAX HEART RATE: 164 BPM
MAX HEART RATE: 164 BPM
MAX PREDICTED HEART RATE: 177 BPM
MAX PREDICTED HEART RATE: 177 BPM
MAX. SYSTOLIC BP: 188 MMHG
MAX. SYSTOLIC BP: 188 MMHG
PROTOCOL NAME: NORMAL
PROTOCOL NAME: NORMAL
TARGET HR FORMULA: NORMAL
TARGET HR FORMULA: NORMAL
TEST INDICATION: NORMAL
TEST INDICATION: NORMAL
TIME IN EXERCISE PHASE: NORMAL
TIME IN EXERCISE PHASE: NORMAL

## 2023-02-21 ENCOUNTER — HOSPITAL ENCOUNTER (OUTPATIENT)
Dept: NON INVASIVE DIAGNOSTICS | Facility: CLINIC | Age: 43
Discharge: HOME | End: 2023-02-21

## 2023-02-21 VITALS
SYSTOLIC BLOOD PRESSURE: 152 MMHG | HEART RATE: 57 BPM | OXYGEN SATURATION: 98 % | HEIGHT: 68 IN | BODY MASS INDEX: 32.58 KG/M2 | DIASTOLIC BLOOD PRESSURE: 84 MMHG | WEIGHT: 215 LBS

## 2023-02-21 DIAGNOSIS — R94.31 ABNORMAL ECG DURING EXERCISE STRESS TEST: ICD-10-CM

## 2023-02-21 DIAGNOSIS — R06.09 DYSPNEA ON EXERTION: ICD-10-CM

## 2023-02-21 LAB
MAX HR PERCENT: 97 %
MAX HR: 173 BPM
RATE PRESSURE PRODUCT: NORMAL
SL CV LV EF: 55
SL CV STRESS RECOVERY BP: NORMAL MMHG
SL CV STRESS RECOVERY HR: 108 BPM
SL CV STRESS RECOVERY O2 SAT: 98 %
SL CV STRESS STAGE REACHED: 4
STRESS ANGINA INDEX: 0
STRESS BASELINE BP: NORMAL MMHG
STRESS BASELINE HR: 57 BPM
STRESS O2 SAT REST: 98 %
STRESS PEAK HR: 162 BPM
STRESS POST ESTIMATED WORKLOAD: 13.4 METS
STRESS POST EXERCISE DUR MIN: 11 MIN
STRESS POST EXERCISE DUR SEC: 39 SEC
STRESS POST O2 SAT PEAK: 97 %
STRESS POST PEAK BP: 216 MMHG

## 2023-02-22 LAB
CHEST PAIN STATEMENT: NORMAL
MAX DIASTOLIC BP: 84 MMHG
MAX HEART RATE: 173 BPM
MAX PREDICTED HEART RATE: 177 BPM
MAX. SYSTOLIC BP: 216 MMHG
PROTOCOL NAME: NORMAL
REASON FOR TERMINATION: NORMAL
TARGET HR FORMULA: NORMAL
TEST INDICATION: NORMAL
TIME IN EXERCISE PHASE: NORMAL

## 2023-03-15 ENCOUNTER — TELEPHONE (OUTPATIENT)
Dept: CARDIOLOGY CLINIC | Facility: CLINIC | Age: 43
End: 2023-03-15

## 2023-03-15 NOTE — TELEPHONE ENCOUNTER
----- Message from Jenny Newell PA-C sent at 3/15/2023  2:45 PM EDT -----  Pls call the pt and see how he is feeling?        Stress test was overall good but his heart rate didn't get up as high as it should      ----- Message -----  From: Osmany Conn MD  Sent: 3/3/2023  12:53 PM EDT  To: Jenny Newell PA-C    Looking at history etc, can consider pharmacological stress test  I havent seen patient though

## 2023-03-15 NOTE — TELEPHONE ENCOUNTER
----- Message from Vic Monet PA-C sent at 3/15/2023  2:45 PM EDT -----  Pls call the pt and see how he is feeling?        Stress test was overall good but his heart rate didn't get up as high as it should      ----- Message -----  From: Deandra Marc MD  Sent: 3/3/2023  12:53 PM EDT  To: Vic Monet PA-C    Looking at history etc, can consider pharmacological stress test  I havent seen patient though

## 2023-03-16 ENCOUNTER — TELEPHONE (OUTPATIENT)
Dept: CARDIOLOGY CLINIC | Facility: CLINIC | Age: 43
End: 2023-03-16

## 2023-03-16 NOTE — TELEPHONE ENCOUNTER
----- Message from Jorge Mario PA-C sent at 3/15/2023  2:45 PM EDT -----  Pls call the pt and see how he is feeling?        Stress test was overall good but his heart rate didn't get up as high as it should      ----- Message -----  From: Ayanna Travis MD  Sent: 3/3/2023  12:53 PM EDT  To: Jorge Mario PA-C    Looking at history etc, can consider pharmacological stress test  I havent seen patient though

## 2023-03-16 NOTE — TELEPHONE ENCOUNTER
Spoke with pt  Patient verbally understood the result of his Echo stress test, exercise  Pt denied any cardiac related symptoms and stated that he feels fine

## 2023-03-20 DIAGNOSIS — I10 ESSENTIAL HYPERTENSION: ICD-10-CM

## 2023-03-20 RX ORDER — LISINOPRIL 20 MG/1
TABLET ORAL
Qty: 90 TABLET | Refills: 1 | Status: SHIPPED | OUTPATIENT
Start: 2023-03-20

## 2023-04-03 ENCOUNTER — OFFICE VISIT (OUTPATIENT)
Dept: FAMILY MEDICINE CLINIC | Facility: CLINIC | Age: 43
End: 2023-04-03

## 2023-04-03 VITALS
OXYGEN SATURATION: 97 % | WEIGHT: 223 LBS | DIASTOLIC BLOOD PRESSURE: 90 MMHG | HEART RATE: 112 BPM | HEIGHT: 68 IN | SYSTOLIC BLOOD PRESSURE: 164 MMHG | BODY MASS INDEX: 33.8 KG/M2

## 2023-04-03 DIAGNOSIS — I10 ESSENTIAL HYPERTENSION: Primary | ICD-10-CM

## 2023-04-03 RX ORDER — CHLORTHALIDONE 25 MG/1
25 TABLET ORAL DAILY
Qty: 30 TABLET | Refills: 5 | Status: SHIPPED | OUTPATIENT
Start: 2023-04-03

## 2023-04-03 NOTE — ASSESSMENT & PLAN NOTE
Not at goal, asymptomatic, stressed importance of achieving goals reviewed goals of therapy, continue lisinopril add on chlorthalidone 25 mg p o  daily monitoring home blood pressures obtain updated chemistries

## 2023-04-03 NOTE — PROGRESS NOTES
BMI Counseling: Body mass index is 33 91 kg/m²  The BMI is above normal  Nutrition recommendations include decreasing portion sizes, decreasing fast food intake, limiting drinks that contain sugar, moderation in carbohydrate intake, increasing intake of lean protein, reducing intake of saturated and trans fat and reducing intake of cholesterol  Exercise recommendations include moderate physical activity 150 minutes/week and exercising 3-5 times per week  No pharmacotherapy was ordered  Rationale for BMI follow-up plan is due to patient being overweight or obese  Assessment/Plan:     Chronic Problems:  Essential hypertension  Not at goal, asymptomatic, stressed importance of achieving goals reviewed goals of therapy, continue lisinopril add on chlorthalidone 25 mg p o  daily monitoring home blood pressures obtain updated chemistries      Visit Diagnosis:  Diagnoses and all orders for this visit:    Essential hypertension  -     chlorthalidone 25 mg tablet; Take 1 tablet (25 mg total) by mouth daily          Subjective:    Patient ID: Jimmy Gilford is a 39 y o  male  F/u   htn   Lisinopril 20 mg taken daily  Has not been monitoring home blood pressures diet could be improved multiple home stressors  Negative chest pain palpitations shortness of breath difficulty breathing cough lesions rash  Continues with Effexor having difficulty with spousal relationship ,  multiple mental health issues        The following portions of the patient's history were reviewed and updated as appropriate: allergies, current medications, past family history, past medical history, past social history, past surgical history and problem list     Review of Systems   Constitutional: Negative for appetite change, chills, fever and unexpected weight change  HENT: Negative for congestion, dental problem, ear pain, hearing loss, postnasal drip, rhinorrhea, sinus pressure, sinus pain, sneezing, sore throat, tinnitus and voice change  "  Eyes: Negative for visual disturbance  Respiratory: Negative for apnea, cough, chest tightness and shortness of breath  Cardiovascular: Negative for chest pain, palpitations and leg swelling  Gastrointestinal: Negative for abdominal pain, blood in stool, constipation, diarrhea, nausea and vomiting  Endocrine: Negative for cold intolerance, heat intolerance, polydipsia, polyphagia and polyuria  Genitourinary: Negative for decreased urine volume, difficulty urinating, dysuria, frequency and hematuria  Musculoskeletal: Negative for arthralgias, back pain, gait problem, joint swelling and myalgias  Skin: Negative for color change, rash and wound  Allergic/Immunologic: Negative for environmental allergies and food allergies  Neurological: Negative for dizziness, syncope, weakness, light-headedness, numbness and headaches  Hematological: Negative for adenopathy  Does not bruise/bleed easily  Psychiatric/Behavioral: Negative for sleep disturbance and suicidal ideas  The patient is not nervous/anxious            /90   Pulse (!) 112   Ht 5' 8\" (1 727 m)   Wt 101 kg (223 lb)   SpO2 97%   BMI 33 91 kg/m²   Social History     Socioeconomic History   • Marital status: Single     Spouse name: Not on file   • Number of children: Not on file   • Years of education: Not on file   • Highest education level: Not on file   Occupational History   • Occupation: employed   Tobacco Use   • Smoking status: Former   • Smokeless tobacco: Never   Vaping Use   • Vaping Use: Never used   Substance and Sexual Activity   • Alcohol use: Yes     Comment: social   • Drug use: No   • Sexual activity: Not on file   Other Topics Concern   • Not on file   Social History Narrative    Always uses seat belt     Daily caffeinated coffee consumption    Lives with family     Social Determinants of Health     Financial Resource Strain: Not on file   Food Insecurity: Not on file   Transportation Needs: Not on file   Physical " Activity: Not on file   Stress: Not on file   Social Connections: Not on file   Intimate Partner Violence: Not on file   Housing Stability: Not on file     Past Medical History:   Diagnosis Date   • Atypical chest pain    • Hyperlipidemia     last assessed 1/17/17   • Hypertension      Family History   Problem Relation Age of Onset   • Hypertension Mother    • Heart disease Father         cardiac d/o   • Diabetes Father    • Heart attack Brother      History reviewed  No pertinent surgical history  Current Outpatient Medications:   •  atorvastatin (LIPITOR) 20 mg tablet, TAKE 1 TABLET BY MOUTH DAILY AT BEDTIME, Disp: 90 tablet, Rfl: 3  •  chlorthalidone 25 mg tablet, Take 1 tablet (25 mg total) by mouth daily, Disp: 30 tablet, Rfl: 5  •  lisinopril (ZESTRIL) 20 mg tablet, TAKE 1 TABLET BY MOUTH EVERY DAY, Disp: 90 tablet, Rfl: 1  •  venlafaxine (EFFEXOR-XR) 37 5 mg 24 hr capsule, TAKE 1 CAPSULE BY MOUTH EVERY DAY, Disp: 90 capsule, Rfl: 1  •  zolpidem (AMBIEN) 5 mg tablet, Take 1 tablet (5 mg total) by mouth daily at bedtime as needed for sleep, Disp: 30 tablet, Rfl: 0    No Known Allergies       Lab Review   Hospital Outpatient Visit on 02/21/2023   Component Date Value   • Baseline HR 02/21/2023 57    • Baseline BP 02/21/2023 152/84    • O2 sat rest 02/21/2023 98    • Stress peak HR 02/21/2023 162    • Post peak BP 02/21/2023 216    • Rate Pressure Product 02/21/2023 34,992 0    • O2 sat peak 02/21/2023 97    • Recovery HR 02/21/2023 108    • Recovery BP 02/21/2023 152/84    • O2 sat recovery 02/21/2023 98    • Max HR 02/21/2023 173    • Max HR Percent 02/21/2023 97    • Exercise duration (min) 02/21/2023 11    • Exercise duration (sec) 02/21/2023 39    • Estimated workload 02/21/2023 13 4    • Angina Index 02/21/2023 0    • Stress Stage Reached 02/21/2023 4 0    • LV EF 02/21/2023 55    • Protocol Name 02/21/2023 TANA    • Time In Exercise Phase 02/21/2023 00:12:33    • MAX   SYSTOLIC BP 91/01/0112 303    • Max Diastolic Bp 85/69/1265 84    • Max Heart Rate 02/21/2023 173    • Max Predicted Heart Rate 02/21/2023 177    • Reason for Termination 02/21/2023 Target Heart Rate Achieved    • Test Indication 02/21/2023 DYSPNEA, SOB    • Target Hr Formular 02/21/2023 (220 - Age)*85%    • Chest Pain Statement 02/21/2023 none    Hospital Outpatient Visit on 02/01/2023   Component Date Value   • Baseline HR 02/01/2023 84    • Baseline BP 02/01/2023 152/98    • O2 sat rest 02/01/2023 99    • Stress peak HR 02/01/2023 164    • Post peak BP 02/01/2023 188    • Rate Pressure Product 02/01/2023 30,832 0    • O2 sat peak 02/01/2023 97    • Recovery HR 02/01/2023 110    • Recovery BP 02/01/2023 158/96    • O2 sat recovery 02/01/2023 99    • Max HR 02/01/2023 164    • Max HR Percent 02/01/2023 92    • Exercise duration (min) 02/01/2023 10    • Exercise duration (sec) 02/01/2023 1    • Estimated workload 02/01/2023 13 4    • Angina Index 02/01/2023 0    • Stress Stage Reached 02/01/2023 4 0    • Protocol Name 02/01/2023 TANA    • Time In Exercise Phase 02/01/2023 00:10:01    • MAX  SYSTOLIC BP 97/16/3370 298    • Max Diastolic Bp 75/30/6866 94    • Max Heart Rate 02/01/2023 164    • Max Predicted Heart Rate 02/01/2023 177    • Reason for Termination 02/01/2023                      Value:Target Heart Rate Achieved  Maximal exercise (symptom limited)     • Test Indication 05/01/3985 SOB on excertion    • Target Hr Formular 02/01/2023 (220 - Age)*85%    • Chest Pain Statement 02/01/2023 none    • Protocol Name 02/01/2023 TANA    • Time In Exercise Phase 02/01/2023 00:10:01    • MAX   SYSTOLIC BP 01/67/2408 057    • Max Diastolic Bp 85/64/1529 94    • Max Heart Rate 02/01/2023 164    • Max Predicted Heart Rate 02/01/2023 177    • Reason for Termination 02/01/2023                      Value:Target Heart Rate Achieved  Maximal exercise (symptom limited)     • Test Indication 41/65/0234 SOB on excertion    • Target Hr Formular 02/01/2023 (220 - "Age)*85%    • Chest Pain Statement 02/01/2023 none         Imaging: No results found  Objective:     Physical Exam  Constitutional:       General: He is not in acute distress  Appearance: He is well-developed  He is not ill-appearing or toxic-appearing  HENT:      Head: Normocephalic and atraumatic  Cardiovascular:      Rate and Rhythm: Normal rate and regular rhythm  Heart sounds: Normal heart sounds  Pulmonary:      Effort: Pulmonary effort is normal       Breath sounds: Normal breath sounds  Musculoskeletal:         General: Normal range of motion  Cervical back: Normal range of motion and neck supple  Skin:     General: Skin is warm and dry  Neurological:      Mental Status: He is alert and oriented to person, place, and time  Deep Tendon Reflexes: Reflexes are normal and symmetric  Psychiatric:         Behavior: Behavior normal          Thought Content: Thought content normal          Judgment: Judgment normal            There are no Patient Instructions on file for this visit  PITA Miller    Portions of the record may have been created with voice recognition software  Occasional wrong word or \"sound a like\" substitutions may have occurred due to the inherent limitations of voice recognition software  Read the chart carefully and recognize, using context, where substitutions have occurred    "

## 2023-04-13 DIAGNOSIS — I10 ESSENTIAL HYPERTENSION: Primary | ICD-10-CM

## 2023-04-13 RX ORDER — NEBIVOLOL 5 MG/1
5 TABLET ORAL DAILY
Qty: 30 TABLET | Refills: 5 | Status: SHIPPED | OUTPATIENT
Start: 2023-04-13 | End: 2023-08-08

## 2023-05-03 ENCOUNTER — TELEPHONE (OUTPATIENT)
Dept: FAMILY MEDICINE CLINIC | Facility: CLINIC | Age: 43
End: 2023-05-03

## 2023-05-03 DIAGNOSIS — L30.9 DERMATITIS: Primary | ICD-10-CM

## 2023-05-03 RX ORDER — TRIAMCINOLONE ACETONIDE 1 MG/G
CREAM TOPICAL 2 TIMES DAILY
Qty: 80 G | Refills: 3 | Status: SHIPPED | OUTPATIENT
Start: 2023-05-03

## 2023-05-03 NOTE — TELEPHONE ENCOUNTER
Pt left a message requesting a medication refill for triamcinolone (Kenalog) 0 1% cream  He stated that he uses this cream before and he needs it again

## 2023-07-28 DIAGNOSIS — F32.A DEPRESSION, UNSPECIFIED DEPRESSION TYPE: ICD-10-CM

## 2023-07-31 RX ORDER — VENLAFAXINE HYDROCHLORIDE 37.5 MG/1
CAPSULE, EXTENDED RELEASE ORAL
Qty: 90 CAPSULE | Refills: 1 | Status: SHIPPED | OUTPATIENT
Start: 2023-07-31

## 2023-08-07 ENCOUNTER — TELEPHONE (OUTPATIENT)
Dept: CARDIOLOGY CLINIC | Facility: CLINIC | Age: 43
End: 2023-08-07

## 2023-08-07 NOTE — TELEPHONE ENCOUNTER
Called to f/u, pt c/o chest tightness, substernal, non radiating, can last up to 10 minutes. Pt admits he has a hx of anxiety attacks and is currently going through a lot in his life, which may explain the chest discomfort. Task on 3/16/23, Dr. Jens Jean recommended a pharm. stress test, please advise  Advised if symptoms worsen to go to ED for evaluation. Pt agreed.

## 2023-08-07 NOTE — TELEPHONE ENCOUNTER
Patient has echo stress test earlier this year that was negative. If patient is having active symptoms he should go to the ED. Otherwise please schedule next available with physician.

## 2023-08-07 NOTE — TELEPHONE ENCOUNTER
Appointment Request From: Svetlana Downey     With Provider: Elaina Brunner, PA-C [facility]iology [facility]]     Preferred Date Range: 8/8/2023 - 8/18/2023     Preferred Times: Any Time     Reason for visit: Cardiology Office Visit     Comments:  Having occasional tightness in cheat and I’m worried

## 2023-08-08 ENCOUNTER — OFFICE VISIT (OUTPATIENT)
Dept: CARDIOLOGY CLINIC | Facility: CLINIC | Age: 43
End: 2023-08-08
Payer: COMMERCIAL

## 2023-08-08 VITALS
RESPIRATION RATE: 16 BRPM | DIASTOLIC BLOOD PRESSURE: 100 MMHG | OXYGEN SATURATION: 97 % | WEIGHT: 220 LBS | HEART RATE: 91 BPM | HEIGHT: 68 IN | SYSTOLIC BLOOD PRESSURE: 180 MMHG | BODY MASS INDEX: 33.34 KG/M2

## 2023-08-08 DIAGNOSIS — F15.10 CAFFEINE ABUSE (HCC): ICD-10-CM

## 2023-08-08 DIAGNOSIS — I10 ESSENTIAL HYPERTENSION: Primary | ICD-10-CM

## 2023-08-08 DIAGNOSIS — F41.9 ANXIETY: ICD-10-CM

## 2023-08-08 DIAGNOSIS — E66.9 OBESITY (BMI 30-39.9): ICD-10-CM

## 2023-08-08 DIAGNOSIS — E78.2 MIXED HYPERLIPIDEMIA: ICD-10-CM

## 2023-08-08 PROCEDURE — 93000 ELECTROCARDIOGRAM COMPLETE: CPT | Performed by: INTERNAL MEDICINE

## 2023-08-08 PROCEDURE — 99214 OFFICE O/P EST MOD 30 MIN: CPT | Performed by: INTERNAL MEDICINE

## 2023-08-08 RX ORDER — METOPROLOL SUCCINATE 50 MG/1
50 TABLET, EXTENDED RELEASE ORAL DAILY
Qty: 30 TABLET | Refills: 3 | Status: SHIPPED | OUTPATIENT
Start: 2023-08-08

## 2023-08-08 NOTE — PROGRESS NOTES
CARDIOLOGY OFFICE VISIT  [facility]  [address], [place], [facility] [address] [facility] [address], [address] [place] [address]  Tel: [phone]      NAME: Quentin Figueroa  AGE: 39 y.o. SEX: male  : 1980  MRN: [id number]      Chief Complaint:  Chief Complaint   Patient presents with   • Follow-up       History of Present Illness:   Patient comes for follow up. States he thinks he is having panic attacks. He has work-related stress with an upcoming timeline to finish his work. For that he drinks a lot of coffee (2 pots in the morning). To keep himself calm he smokes marijuana. Feels that if he does not do this he is not able to function. He is a  and has a sedentary lifestyle but randomly when he goes to play basketball he feels short of breath. Denies associated chest pain/pressure/tightness. Also denies palpitations, lightheadedness, syncope, swelling feet, orthopnea, PND, claudication. Essential hypertension -  Has been hypertensive for many years. Taking lisinopril only. Unaware that he was supposed to be on a beta-blocker and chlorthalidone as well. Denies lightheadedness, headache, medication side effects. Mixed hyperlipidemia -  Has had hyperlipidemia for many years. Taking statin regularly along with diet control. Denies myalgia. PCP closely monitoring the blood work. Obesity -  Trying to lose weight. Past Medical History:  Past Medical History:   Diagnosis Date   • Atypical chest pain    • Hyperlipidemia     last assessed 17   • Hypertension          Past Surgical History:  No past surgical history on file.       Family History:  Family History   Problem Relation Age of Onset   • Hypertension Mother    • Heart disease Father         cardiac d/o   • Diabetes Father    • Heart attack Brother          Social History:  Social History     Socioeconomic History   • Marital status: Single     Spouse name: None   • Number of children: None   • Years of education: None   • Highest education level: None   Occupational History   • Occupation: employed   Tobacco Use   • Smoking status: Former     Types: Cigarettes     Quit date: 2019     Years since quittin.6   • Smokeless tobacco: Never   Vaping Use   • Vaping Use: Never used   Substance and Sexual Activity   • Alcohol use: Yes     Comment: social   • Drug use: Yes     Types: Marijuana   • Sexual activity: Yes     Partners: Female   Other Topics Concern   • None   Social History Narrative    Always uses seat belt     Daily caffeinated coffee consumption    Lives with family     Social Determinants of Health     Financial Resource Strain: Not on file   Food Insecurity: Not on file   Transportation Needs: Not on file   Physical Activity: Not on file   Stress: Not on file   Social Connections: Not on file   Intimate Partner Violence: Not on file   Housing Stability: Not on file         Active Problems:  Patient Active Problem List   Diagnosis   • Mixed hyperlipidemia   • Essential hypertension   • Chest pain due to myocardial ischemia   • Family history of premature CAD   • Depression         The following portions of the patient's history were reviewed and updated as appropriate: past medical history, past surgical history, past family history,  past social history, current medications, allergies and problem list.      Review of Systems:  Constitutional: Denies fever, chills  Eyes: Denies eye redness, eye discharge  ENT: Denies hearing loss, sneezing, nasal discharge, sore throat   Respiratory: Denies cough, expectoration.  +shortness of breath  Cardiovascular: Denies chest pain, lower extremity swelling  Gastrointestinal: Denies abdominal pain, nausea, vomiting, diarrhea  Genito-Urinary: Denies dysuria, incontinence  Musculoskeletal: Denies back pain, joint pain, muscle pain  Neurologic: Denies lightheadedness, syncope, headache, seizures  Endocrine: Denies polydipsia, temperature intolerance  Allergy and Immunology: Denies hives, insect bite sensitivity  Hematological and Lymphatic: Denies bleeding problems, swollen glands   Psychological: +anxiety, +panic  Dermatological: Denies pruritus, rash, skin lesion changes      Vitals:  Vitals:    08/08/23 1008   BP: (!) 180/100   Pulse: 91   Resp: 16   SpO2: 97%       Body mass index is 33.45 kg/m². Weight (last 2 days)     Date/Time Weight    08/08/23 1008 99.8 (220)            Physical Examination:  General: Anxious looking. Awake, alert, oriented in time, place and person. Responding to commands  Head: Normocephalic. Atraumatic  Eyes: Both pupils normal sized, round and reactive to light. Nonicteric  ENT: Normal external ear canals  Neck: Supple. JVP not raised. Trachea central. No thyromegaly  Lungs: Bilateral bronchovascular breath sounds with no crackles or rhonchi  Chest wall: No tenderness  Cardiovascular: Tachy. S1 and S2 normal. No murmur, rub or gallop  Gastrointestinal: Abdomen soft, nontender. No guarding or rigidity. Liver and spleen not palpable. Bowel sounds present  Neurologic: Patient is awake, alert, oriented in time, place and person. Responding to commands. Moving all extremities  Integumentary:  Sweaty palms  Lymphatic: No cervical lymphadenopathy  Back: Symmetric.  No CVA tenderness  Extremities: No clubbing, cyanosis or edema      Laboratory Results:  CBC with diff:   Lab Results   Component Value Date    WBC 9.60 08/15/2022    RBC 4.65 08/15/2022    HGB 13.2 08/15/2022    HCT 42.5 08/15/2022    MCV 91 08/15/2022    MCH 28.4 08/15/2022    RDW 12.0 08/15/2022     08/15/2022       CMP:  Lab Results   Component Value Date    CREATININE 0.77 08/15/2022    BUN 9 08/15/2022    K 4.0 08/15/2022     08/15/2022    CO2 24 08/15/2022    ALKPHOS 94 08/15/2022    ALT 70 08/15/2022    AST 38 08/15/2022       Lab Results   Component Value Date    CKMB <1.0 05/30/2018    CKTOTAL 168 05/30/2018       Lipid Profile:   No results found for: "CHOL"  Lab Results   Component Value Date    HDL 41 08/15/2022    HDL 38 (L) 02/16/2022    HDL 40 09/01/2020     Lab Results   Component Value Date    LDLCALC 111 (H) 08/15/2022    LDLCALC 107 (H) 02/16/2022    LDLCALC 102 (H) 09/01/2020     Lab Results   Component Value Date    TRIG 197 (H) 08/15/2022    TRIG 252 (H) 02/16/2022    TRIG 165 (H) 09/01/2020       Cardiac testing:   Results for orders placed during the hospital encounter of 04/06/22    Echo complete w/ contrast if indicated    Interpretation Summary  •  Left Ventricle: Left ventricular cavity size is normal. Wall thickness is normal. Systolic function is normal.  Estimated LVEF 60% Wall motion is normal. Diastolic function is normal.  •  Mitral Valve: There is trace regurgitation. •  Tricuspid Valve: There is trace regurgitation. Results for orders placed during the hospital encounter of 02/21/23    Echo stress test, exercise    Interpretation Summary  •  Left Ventricle: Left ventricular cavity size is normal. Wall thickness is normal. The left ventricular ejection fraction is 55%. Systolic function is normal. Wall motion is normal.  •  Stress ECG: Nonspecific up sloping ST depression in the inferior leads is noted. ST deviation returned to baseline after less than 1 minute of recovery. There were no arrhythmias during recovery. . The ECG was negative for ischemia. The stress ECG is negative for ischemia after maximal exercise, without reproduction of symptoms. •  Stress ECG: Blood pressure demonstrated a hypertensive response and heart rate demonstrated a normal response to stress. The patient's heart rate recovery was normal.  •  Peak Stress Echo: Left ventricle cavity has normal reduction in size at peak stress. The left ventricle systolic function is normal at peak stress. The peak stress echo showed normal wall motion. •  Echo Post Impression: The study is normal, after maximal exercise.  Heart rate had decreased to below target heart rate at time of image acquisition which limits interpretation. Clinical correlation recommended. Results for orders placed during the hospital encounter of 02/01/23    Stress test only, exercise    Interpretation Summary  •  Stress ECG: Horizontal ST depression 1 mm  in the inferior leads at peak exercise is noted. This resolved during recovery. Clinical correlation is suggested. The ECG was positive for ischemia. Further cardiac evaluation as clinically indicated is recommended. Medications:    Current Outpatient Medications:   •  atorvastatin (LIPITOR) 20 mg tablet, TAKE 1 TABLET BY MOUTH DAILY AT BEDTIME, Disp: 90 tablet, Rfl: 3  •  lisinopril (ZESTRIL) 20 mg tablet, TAKE 1 TABLET BY MOUTH EVERY DAY, Disp: 90 tablet, Rfl: 1  •  triamcinolone (KENALOG) 0.1 % cream, Apply topically 2 (two) times a day, Disp: 80 g, Rfl: 3  •  venlafaxine (EFFEXOR-XR) 37.5 mg 24 hr capsule, TAKE 1 CAPSULE BY MOUTH EVERY DAY, Disp: 90 capsule, Rfl: 1      Allergies:  No Known Allergies    ECG: Reviewed by me. 8/8/2023. Sinus tachycardia 102 bpm    Assessment and Plan:  1. Essential hypertension  BP high, partly contributed by anxiety and caffeine use. Add beta-blocker to lisinopril. Monitor BP at home and call if abnormal    2. Mixed hyperlipidemia  Continue statin and diet control. His PCP closely monitors his blood work    3. Anxiety  Cut back coffee to only two 12 ounce cups a day  Patient has an upcoming appointment with a psychologist  Started on a beta-blocker for symptom relief  Prior echocardiogram and stress echo findings reviewed    4. Caffeine abuse (HCC)  Cut back coffee to only two 12 ounces cups a day. Do not use marijuana    Recommend aggressive risk factor modification and therapeutic lifestyle changes. Low-salt, low-calorie, low-fat, low-cholesterol diet with regular exercise and to optimize weight.   I will defer the ordering and monitoring of necessity lab studies to you, but I am available and happy to review and manage any of the data at your request in the future. Discussed concepts of atherosclerosis, including signs and symptoms of cardiac disease. Previous studies were reviewed. Safety measures were reviewed. Questions were entertained and answered. Patient was advised to report any problems requiring medical attention. Follow-up with PCP and appropriate specialist and lab work as discussed. Return for follow up visit as scheduled or earlier, if needed. Thank you for allowing me to participate in the care and evaluation of your patient. Should you have any questions, please feel free to contact me.       Jordan Moore MD  6/7/0324,39:12 AM

## 2023-08-30 DIAGNOSIS — I10 ESSENTIAL HYPERTENSION: ICD-10-CM

## 2023-08-30 RX ORDER — METOPROLOL SUCCINATE 50 MG/1
50 TABLET, EXTENDED RELEASE ORAL DAILY
Qty: 90 TABLET | Refills: 3 | Status: SHIPPED | OUTPATIENT
Start: 2023-08-30

## 2023-09-16 DIAGNOSIS — I10 ESSENTIAL HYPERTENSION: ICD-10-CM

## 2023-09-16 DIAGNOSIS — E78.2 MIXED HYPERLIPIDEMIA: ICD-10-CM

## 2023-09-18 RX ORDER — LISINOPRIL 20 MG/1
TABLET ORAL
Qty: 90 TABLET | Refills: 1 | Status: SHIPPED | OUTPATIENT
Start: 2023-09-18

## 2023-09-18 RX ORDER — ATORVASTATIN CALCIUM 20 MG/1
20 TABLET, FILM COATED ORAL
Qty: 90 TABLET | Refills: 3 | Status: SHIPPED | OUTPATIENT
Start: 2023-09-18

## 2023-09-28 ENCOUNTER — TELEPHONE (OUTPATIENT)
Dept: FAMILY MEDICINE CLINIC | Facility: CLINIC | Age: 43
End: 2023-09-28

## 2023-09-28 NOTE — TELEPHONE ENCOUNTER
Patient called in and confirmed he has covid and wanted to know if you get possibly send in some antibiotics for him. He did try to get a virtual appointment but there was no opening today. Please advise.   Thanks

## 2024-04-15 DIAGNOSIS — I10 ESSENTIAL HYPERTENSION: ICD-10-CM

## 2024-04-15 RX ORDER — LISINOPRIL 20 MG/1
TABLET ORAL
Qty: 90 TABLET | Refills: 0 | Status: SHIPPED | OUTPATIENT
Start: 2024-04-15

## 2024-04-30 ENCOUNTER — APPOINTMENT (EMERGENCY)
Dept: RADIOLOGY | Facility: HOSPITAL | Age: 44
End: 2024-04-30
Payer: COMMERCIAL

## 2024-04-30 ENCOUNTER — HOSPITAL ENCOUNTER (EMERGENCY)
Facility: HOSPITAL | Age: 44
Discharge: HOME | End: 2024-04-30
Attending: EMERGENCY MEDICINE
Payer: COMMERCIAL

## 2024-04-30 VITALS
OXYGEN SATURATION: 95 % | TEMPERATURE: 97 F | HEART RATE: 83 BPM | SYSTOLIC BLOOD PRESSURE: 154 MMHG | RESPIRATION RATE: 20 BRPM | DIASTOLIC BLOOD PRESSURE: 98 MMHG

## 2024-04-30 DIAGNOSIS — R07.9 CHEST PAIN: Primary | ICD-10-CM

## 2024-04-30 DIAGNOSIS — I10 ESSENTIAL HYPERTENSION: ICD-10-CM

## 2024-04-30 DIAGNOSIS — R94.31 ABNORMAL EKG: ICD-10-CM

## 2024-04-30 LAB
BASOPHILS # BLD AUTO: 0.11 THOUSANDS/ÂΜL (ref 0–0.1)
BASOPHILS NFR BLD AUTO: 1 % (ref 0–1)
CARDIAC TROPONIN I PNL SERPL HS: 3 NG/L
EOSINOPHIL # BLD AUTO: 0.18 THOUSAND/ÂΜL (ref 0–0.61)
EOSINOPHIL NFR BLD AUTO: 2 % (ref 0–6)
ERYTHROCYTE [DISTWIDTH] IN BLOOD BY AUTOMATED COUNT: 11.4 % (ref 11.6–15.1)
HCT VFR BLD AUTO: 44.6 % (ref 36.5–49.3)
HGB BLD-MCNC: 14.2 G/DL (ref 12–17)
IMM GRANULOCYTES # BLD AUTO: 0.01 THOUSAND/UL (ref 0–0.2)
IMM GRANULOCYTES NFR BLD AUTO: 0 % (ref 0–2)
LYMPHOCYTES # BLD AUTO: 3.35 THOUSANDS/ÂΜL (ref 0.6–4.47)
LYMPHOCYTES NFR BLD AUTO: 35 % (ref 14–44)
MCH RBC QN AUTO: 28.3 PG (ref 26.8–34.3)
MCHC RBC AUTO-ENTMCNC: 31.8 G/DL (ref 31.4–37.4)
MCV RBC AUTO: 89 FL (ref 82–98)
MONOCYTES # BLD AUTO: 0.65 THOUSAND/ÂΜL (ref 0.17–1.22)
MONOCYTES NFR BLD AUTO: 7 % (ref 4–12)
NEUTROPHILS # BLD AUTO: 5.19 THOUSANDS/ÂΜL (ref 1.85–7.62)
NEUTS SEG NFR BLD AUTO: 55 % (ref 43–75)
NRBC BLD AUTO-RTO: 0 /100 WBCS
PLATELET # BLD AUTO: 245 THOUSANDS/UL (ref 149–390)
PMV BLD AUTO: 10.8 FL (ref 8.9–12.7)
RBC # BLD AUTO: 5.02 MILLION/UL (ref 3.88–5.62)
WBC # BLD AUTO: 9.49 THOUSAND/UL (ref 4.31–10.16)

## 2024-04-30 PROCEDURE — 85025 COMPLETE CBC W/AUTO DIFF WBC: CPT | Performed by: EMERGENCY MEDICINE

## 2024-04-30 PROCEDURE — 99285 EMERGENCY DEPT VISIT HI MDM: CPT

## 2024-04-30 PROCEDURE — 99285 EMERGENCY DEPT VISIT HI MDM: CPT | Performed by: EMERGENCY MEDICINE

## 2024-04-30 PROCEDURE — 36415 COLL VENOUS BLD VENIPUNCTURE: CPT | Performed by: EMERGENCY MEDICINE

## 2024-04-30 PROCEDURE — 84484 ASSAY OF TROPONIN QUANT: CPT | Performed by: EMERGENCY MEDICINE

## 2024-04-30 PROCEDURE — 93005 ELECTROCARDIOGRAM TRACING: CPT

## 2024-04-30 PROCEDURE — 71046 X-RAY EXAM CHEST 2 VIEWS: CPT

## 2024-04-30 RX ORDER — METOPROLOL SUCCINATE 50 MG/1
50 TABLET, EXTENDED RELEASE ORAL DAILY
Qty: 90 TABLET | Refills: 0 | Status: SHIPPED | OUTPATIENT
Start: 2024-04-30

## 2024-04-30 RX ORDER — LORAZEPAM 2 MG/ML
1 INJECTION INTRAMUSCULAR ONCE
Status: DISCONTINUED | OUTPATIENT
Start: 2024-04-30 | End: 2024-04-30 | Stop reason: HOSPADM

## 2024-04-30 RX ORDER — LORAZEPAM 2 MG/ML
1 INJECTION INTRAMUSCULAR ONCE
Status: DISCONTINUED | OUTPATIENT
Start: 2024-04-30 | End: 2024-04-30

## 2024-04-30 NOTE — ED PROVIDER NOTES
History  Chief Complaint   Patient presents with    Chest Pain     C/o CP and sob x 2 months. Has known HTN and just restarted 1 wk ago.      45 yo male without personal h/o CAD but with family h/o premature CAD who presents to the ED with one month of intermittent sternal nonradiating nonpleuritic sharp CP which occurs during arguments and with stress. Not worse with exertion. No vomiting or diaphoresis. No leg pain or swelling. No hemoptysis. Associated dyspnea. No fever.         Prior to Admission Medications   Prescriptions Last Dose Informant Patient Reported? Taking?   atorvastatin (LIPITOR) 20 mg tablet 2024  No Yes   Sig: TAKE 1 TABLET BY MOUTH EVERYDAY AT BEDTIME   lisinopril (ZESTRIL) 20 mg tablet 2024 at 0900  No Yes   Sig: TAKE 1 TABLET BY MOUTH EVERY DAY   metoprolol succinate (TOPROL-XL) 50 mg 24 hr tablet Not Taking  No No   Sig: TAKE 1 TABLET BY MOUTH EVERY DAY   Patient not taking: Reported on 2024   metoprolol succinate (TOPROL-XL) 50 mg 24 hr tablet   No Yes   Sig: Take 1 tablet (50 mg total) by mouth daily   triamcinolone (KENALOG) 0.1 % cream   No No   Sig: Apply topically 2 (two) times a day   venlafaxine (EFFEXOR-XR) 37.5 mg 24 hr capsule   No No   Sig: TAKE 1 CAPSULE BY MOUTH EVERY DAY      Facility-Administered Medications: None       Past Medical History:   Diagnosis Date    Atypical chest pain     Hyperlipidemia     last assessed 17    Hypertension        History reviewed. No pertinent surgical history.    Family History   Problem Relation Age of Onset    Hypertension Mother     Heart disease Father         cardiac d/o    Diabetes Father     Heart attack Brother      I have reviewed and agree with the history as documented.    E-Cigarette/Vaping    E-Cigarette Use Never User      E-Cigarette/Vaping Substances     Social History     Tobacco Use    Smoking status: Former     Current packs/day: 0.00     Types: Cigarettes     Quit date: 2019     Years since quittin.3     Smokeless tobacco: Never   Vaping Use    Vaping status: Never Used   Substance Use Topics    Alcohol use: Yes     Comment: social    Drug use: Yes     Types: Marijuana       Review of Systems   Respiratory:  Positive for chest tightness and shortness of breath.        Physical Exam  Physical Exam  Vitals and nursing note reviewed.   Constitutional:       General: He is not in acute distress.     Appearance: He is well-developed. He is not ill-appearing, toxic-appearing or diaphoretic.   HENT:      Head: Normocephalic and atraumatic.      Mouth/Throat:      Mouth: Mucous membranes are moist.      Pharynx: Oropharynx is clear.   Eyes:      Conjunctiva/sclera: Conjunctivae normal.      Pupils: Pupils are equal, round, and reactive to light.   Neck:      Vascular: No JVD.   Cardiovascular:      Rate and Rhythm: Normal rate and regular rhythm.      Pulses: Normal pulses.      Heart sounds: Normal heart sounds. No murmur heard.     No friction rub. No gallop.   Pulmonary:      Effort: Pulmonary effort is normal. No respiratory distress.      Breath sounds: Normal breath sounds. No stridor. No wheezing or rales.   Abdominal:      General: There is no distension.      Palpations: Abdomen is soft.      Tenderness: There is no abdominal tenderness. There is no guarding or rebound.   Musculoskeletal:         General: No swelling, tenderness, deformity or signs of injury. Normal range of motion.      Cervical back: Normal range of motion and neck supple. No rigidity.      Right lower leg: No tenderness. No edema.      Left lower leg: No tenderness. No edema.   Skin:     General: Skin is warm and dry.      Capillary Refill: Capillary refill takes less than 2 seconds.      Coloration: Skin is not jaundiced or pale.      Findings: No bruising or erythema.   Neurological:      General: No focal deficit present.      Mental Status: He is alert and oriented to person, place, and time.      Cranial Nerves: No cranial nerve deficit.       Sensory: No sensory deficit.      Motor: No weakness or abnormal muscle tone.      Coordination: Coordination normal.      Gait: Gait normal.         Vital Signs  ED Triage Vitals   Temperature Pulse Respirations Blood Pressure SpO2   04/30/24 1827 04/30/24 1827 04/30/24 1827 04/30/24 1827 04/30/24 1827   (!) 97 °F (36.1 °C) 96 16 (!) 172/98 95 %      Temp Source Heart Rate Source Patient Position - Orthostatic VS BP Location FiO2 (%)   04/30/24 1827 04/30/24 1827 04/30/24 1827 04/30/24 1827 --   Temporal Monitor Sitting Left arm       Pain Score       04/30/24 1915       No Pain           Vitals:    04/30/24 1827 04/30/24 1915 04/30/24 1930 04/30/24 2000   BP: (!) 172/98 (!) 185/108 147/96 154/98   Pulse: 96 90 93 83   Patient Position - Orthostatic VS: Sitting Sitting Sitting Sitting         Visual Acuity      ED Medications  Medications   LORazepam (ATIVAN) injection 1 mg (has no administration in time range)       Diagnostic Studies  Results Reviewed       Procedure Component Value Units Date/Time    HS Troponin I 4hr [[id number]]     Lab Status: No result Specimen: Blood     Basic metabolic panel [[id number]] Updated: 04/30/24 2002    Lab Status: No result Specimen: Blood from Arm, Right     HS Troponin I 2hr [[id number]]     Lab Status: No result Specimen: Blood     HS Troponin 0hr (reflex protocol) [[id number]]  (Normal) Collected: 04/30/24 1921    Lab Status: Final result Specimen: Blood from Arm, Right Updated: 04/30/24 1954     hs TnI 0hr 3 ng/L     CBC and differential [[id number]]  (Abnormal) Collected: 04/30/24 1921    Lab Status: Final result Specimen: Blood from Arm, Right Updated: 04/30/24 1927     WBC 9.49 Thousand/uL      RBC 5.02 Million/uL      Hemoglobin 14.2 g/dL      Hematocrit 44.6 %      MCV 89 fL      MCH 28.3 pg      MCHC 31.8 g/dL      RDW 11.4 %      MPV 10.8 fL      Platelets 245 Thousands/uL      nRBC 0 /100 WBCs      Segmented % 55 %      Immature Grans % 0 %      Lymphocytes % 35 %       Monocytes % 7 %      Eosinophils Relative 2 %      Basophils Relative 1 %      Absolute Neutrophils 5.19 Thousands/µL      Absolute Immature Grans 0.01 Thousand/uL      Absolute Lymphocytes 3.35 Thousands/µL      Absolute Monocytes 0.65 Thousand/µL      Eosinophils Absolute 0.18 Thousand/µL      Basophils Absolute 0.11 Thousands/µL                    XR chest 2 views   ED Interpretation by Jake Jackman MD (04/30 1952)   Normal study.                  Procedures  ECG 12 Lead Documentation Only    Date/Time: 4/30/2024 6:52 PM    Performed by: Jake Jackman MD  Authorized by: Jake Jackman MD    Indications / Diagnosis:  Cp  ECG reviewed by me, the ED Provider: yes    Patient location:  ED  Previous ECG:     Previous ECG:  Unavailable  Interpretation:     Interpretation: non-specific    Rate:     ECG rate:  89    ECG rate assessment: normal    Rhythm:     Rhythm: sinus rhythm    Comments:      Inferior and lateral st depression and t wave inversion. No prior EKG available for comparison.            ED Course             HEART Risk Score      Flowsheet Row Most Recent Value   Heart Score Risk Calculator    History 0 Filed at: 04/30/2024 1959   ECG 2 Filed at: 04/30/2024 1959   Age 0 Filed at: 04/30/2024 1959   Risk Factors 1 Filed at: 04/30/2024 1959   Troponin 0 Filed at: 04/30/2024 1959   HEART Score 3 Filed at: 04/30/2024 1959                                        Medical Decision Making  Problems Addressed:  Chest pain: acute illness or injury     Details: Has scheduled f/u with cards next week. Return precautions discussed. Heart score 3, ok for continued w/u as outpt. I discussed diagnostic limitations of ED evaluation for ACS with pt and spouse, they are aware of these. Based on SDM and heart score of 3 I think further w/u as outpt is reasonable at this time. Pt discharged in stable condition, no cp at time of d/c.     Amount and/or Complexity of Data Reviewed  Labs: ordered.  Radiology: ordered and  independent interpretation performed.  ECG/medicine tests: ordered and independent interpretation performed. Decision-making details documented in ED Course.    Risk  Prescription drug management.             Disposition  Final diagnoses:   Chest pain   Abnormal EKG     Time reflects when diagnosis was documented in both MDM as applicable and the Disposition within this note       Time User Action Codes Description Comment    4/30/2024  8:08 PM Jackman, Jake Add [I10] Essential hypertension     4/30/2024  8:09 PM Jackman, Jake Add [R07.9] Chest pain     4/30/2024  8:16 PM Jackman, Jake Add [R94.31] Abnormal EKG           ED Disposition       ED Disposition   Discharge    Condition   Stable    Date/Time   Tue Apr 30, 2024 2009    Comment   Isac Paz discharge to home/self care.                   Follow-up Information       Follow up With Specialties Details Why Contact Info    Bogdan Raymundo MD Cardiology In 1 week  [address] [id number] [phone]              Current Discharge Medication List        CONTINUE these medications which have CHANGED    Details   metoprolol succinate (TOPROL-XL) 50 mg 24 hr tablet Take 1 tablet (50 mg total) by mouth daily  Qty: 90 tablet, Refills: 0    Associated Diagnoses: Essential hypertension           CONTINUE these medications which have NOT CHANGED    Details   atorvastatin (LIPITOR) 20 mg tablet TAKE 1 TABLET BY MOUTH EVERYDAY AT BEDTIME  Qty: 90 tablet, Refills: 3    Associated Diagnoses: Mixed hyperlipidemia      lisinopril (ZESTRIL) 20 mg tablet TAKE 1 TABLET BY MOUTH EVERY DAY  Qty: 90 tablet, Refills: 0    Associated Diagnoses: Essential hypertension      triamcinolone (KENALOG) 0.1 % cream Apply topically 2 (two) times a day  Qty: 80 g, Refills: 3    Associated Diagnoses: Dermatitis      venlafaxine (EFFEXOR-XR) 37.5 mg 24 hr capsule TAKE 1 CAPSULE BY MOUTH EVERY DAY  Qty: 90 capsule, Refills: 1    Associated Diagnoses: Depression, unspecified  depression type             No discharge procedures on file.    PDMP Review         Value Time User    PDMP Reviewed  Yes 7/19/2022  9:46 AM PITA Bush            ED Provider  Electronically Signed by             Jake Jackman MD  04/30/24 2016

## 2024-05-01 LAB
ATRIAL RATE: 89 BPM
P AXIS: 36 DEGREES
PR INTERVAL: 162 MS
QRS AXIS: 66 DEGREES
QRSD INTERVAL: 90 MS
QT INTERVAL: 350 MS
QTC INTERVAL: 425 MS
T WAVE AXIS: -5 DEGREES
VENTRICULAR RATE: 89 BPM

## 2024-05-01 PROCEDURE — 93010 ELECTROCARDIOGRAM REPORT: CPT | Performed by: INTERNAL MEDICINE

## 2024-05-22 ENCOUNTER — OFFICE VISIT (OUTPATIENT)
Dept: CARDIOLOGY CLINIC | Facility: CLINIC | Age: 44
End: 2024-05-22
Payer: COMMERCIAL

## 2024-05-22 VITALS
BODY MASS INDEX: 31.52 KG/M2 | SYSTOLIC BLOOD PRESSURE: 122 MMHG | HEART RATE: 78 BPM | HEIGHT: 68 IN | RESPIRATION RATE: 16 BRPM | OXYGEN SATURATION: 98 % | DIASTOLIC BLOOD PRESSURE: 80 MMHG | WEIGHT: 208 LBS

## 2024-05-22 DIAGNOSIS — I10 ESSENTIAL HYPERTENSION: Primary | ICD-10-CM

## 2024-05-22 DIAGNOSIS — F41.9 ANXIETY: ICD-10-CM

## 2024-05-22 DIAGNOSIS — Z12.11 ENCOUNTER FOR SCREENING COLONOSCOPY: ICD-10-CM

## 2024-05-22 DIAGNOSIS — F15.10 CAFFEINE ABUSE (HCC): ICD-10-CM

## 2024-05-22 DIAGNOSIS — E78.2 MIXED HYPERLIPIDEMIA: ICD-10-CM

## 2024-05-22 DIAGNOSIS — E66.9 OBESITY (BMI 30-39.9): ICD-10-CM

## 2024-05-22 PROCEDURE — 99214 OFFICE O/P EST MOD 30 MIN: CPT | Performed by: INTERNAL MEDICINE

## 2024-05-22 NOTE — PROGRESS NOTES
CARDIOLOGY OFFICE VISIT  [facility]  [address], [place] [address]  Tel: [phone]      NAME: Isac Paz  AGE: 44 y.o.  SEX: male  : 1980  MRN: [id number]      Chief Complaint:  Chief Complaint   Patient presents with    Follow-up         History of Present Illness:   Patient comes for follow up. States he is doing well from cardiac stand point and denies chest pain / pressure, SOB, palpitations, lightheadedness, syncope, swelling feet, orthopnea, PND, claudication.  States he has started jogging/running and feels great doing it.    Asked about his ER visit for chest pain last month, states he was overdoing caffeine and marijuana at that time, he had stopped taking all his meds and along with his anxiety/panic, he was having intermittent sharp sternal pains.  Backing off from caffeine and marijuana, restarting his meds and starting to exercise has relieved the pain    Essential hypertension -  Has been hypertensive for many years.  Taking medications regularly.  Denies lightheadedness, headache, medication side effects.      Mixed hyperlipidemia -  Has had hyperlipidemia for many years.  Taking statin regularly along with diet control.  Denies myalgia.  PCP closely monitoring the blood work.    Obesity -  Trying to lose weight.      Past Medical History:  Past Medical History:   Diagnosis Date    Atypical chest pain     Hyperlipidemia     last assessed 17    Hypertension        Family History:  Family History   Problem Relation Age of Onset    Hypertension Mother     Heart disease Father         cardiac d/o    Diabetes Father     Heart attack Brother          Social History:  Social History     Socioeconomic History    Marital status: Single     Spouse name: None    Number of children: None    Years of education: None    Highest education level: None   Occupational History    Occupation: employed   Tobacco Use     Smoking status: Former     Current packs/day: 0.00     Types: Cigarettes     Quit date:      Years since quittin.3    Smokeless tobacco: Never   Vaping Use    Vaping status: Never Used   Substance and Sexual Activity    Alcohol use: Yes     Comment: social    Drug use: Yes     Types: Marijuana    Sexual activity: Yes     Partners: Female   Other Topics Concern    None   Social History Narrative    Always uses seat belt     Daily caffeinated coffee consumption    Lives with family     Social Determinants of Health     Financial Resource Strain: Not on file   Food Insecurity: Not on file   Transportation Needs: Not on file   Physical Activity: Not on file   Stress: Not on file   Social Connections: Not on file   Intimate Partner Violence: Not on file   Housing Stability: Not on file         Active Problems:  Patient Active Problem List   Diagnosis    Mixed hyperlipidemia    Essential hypertension    Chest pain due to myocardial ischemia    Family history of premature CAD    Depression         The following portions of the patient's history were reviewed and updated as appropriate: past medical history, past surgical history, past family history,  past social history, current medications, allergies and problem list.      Review of Systems:  Constitutional: Denies fever, chills  Eyes: Denies eye redness, eye discharge  ENT: Denies hearing loss, sneezing, nasal discharge, sore throat   Respiratory: Denies cough, expectoration, shortness of breath  Cardiovascular: Denies chest pain, palpitations, lower extremity swelling  Gastrointestinal: Denies abdominal pain, nausea, vomiting, diarrhea  Genito-Urinary: Denies dysuria, incontinence  Musculoskeletal: Denies back pain, joint pain, muscle pain  Neurologic: Denies lightheadedness, syncope, headache, seizures  Endocrine: Denies polydipsia, temperature intolerance  Allergy and Immunology: Denies hives, insect bite sensitivity  Hematological and Lymphatic: Denies  "bleeding problems, swollen glands   Psychological: +anxiety, +panic  Dermatological: Denies pruritus, rash, skin lesion changes      Vitals:  Vitals:    05/22/24 0936   BP: 122/80   Pulse: 78   Resp: 16   SpO2: 98%       Body mass index is 31.63 kg/m².    Weight (last 2 days)       Date/Time Weight    05/22/24 0936 94.3 (208)              Physical Examination:  General: Patient is not in acute distress. Awake, alert, oriented in time, place and person. Responding to commands  Head: Normocephalic. Atraumatic  Eyes: Both pupils normal sized, round and reactive to light. Nonicteric  ENT: Normal external ear canals  Neck: Supple. JVP not raised. Trachea central. No thyromegaly  Lungs: Bilateral bronchovascular breath sounds with no crackles or rhonchi  Chest wall: No tenderness  Cardiovascular: RRR. S1 and S2 normal. No murmur, rub or gallop  Gastrointestinal: Abdomen soft, nontender. No guarding or rigidity. Liver and spleen not palpable. Bowel sounds present  Neurologic: Patient is awake, alert, oriented in time, place and person. Responding to commands. Moving all extremities  Integumentary:  No skin rash  Lymphatic: No cervical lymphadenopathy  Back: Symmetric. No CVA tenderness  Extremities: No clubbing, cyanosis or edema      Laboratory Results:  CBC with diff:   Lab Results   Component Value Date    WBC 9.49 04/30/2024    RBC 5.02 04/30/2024    HGB 14.2 04/30/2024    HCT 44.6 04/30/2024    MCV 89 04/30/2024    MCH 28.3 04/30/2024    RDW 11.4 (L) 04/30/2024     04/30/2024       CMP:  Lab Results   Component Value Date    CREATININE 0.66 09/28/2023    BUN 9 09/28/2023    K 4.4 09/28/2023     09/28/2023    CO2 19 (L) 09/28/2023    ALKPHOS 70 09/28/2023    ALT 32 09/28/2023    AST 28 09/28/2023       Lab Results   Component Value Date    CKMB <1.0 05/30/2018    CKTOTAL 168 05/30/2018       Lipid Profile:   No results found for: \"CHOL\"  Lab Results   Component Value Date    HDL 41 08/15/2022    HDL 38 (L) " 02/16/2022    HDL 40 09/01/2020     Lab Results   Component Value Date    LDLCALC 111 (H) 08/15/2022    LDLCALC 107 (H) 02/16/2022    LDLCALC 102 (H) 09/01/2020     Lab Results   Component Value Date    TRIG 197 (H) 08/15/2022    TRIG 252 (H) 02/16/2022    TRIG 165 (H) 09/01/2020       Cardiac testing:   Results for orders placed during the hospital encounter of 04/06/22    Echo complete w/ contrast if indicated    Interpretation Summary    Left Ventricle: Left ventricular cavity size is normal. Wall thickness is normal. Systolic function is normal.  Estimated LVEF 60% Wall motion is normal. Diastolic function is normal.    Mitral Valve: There is trace regurgitation.    Tricuspid Valve: There is trace regurgitation.      Results for orders placed during the hospital encounter of 02/21/23    Echo stress test, exercise    Interpretation Summary    Left Ventricle: Left ventricular cavity size is normal. Wall thickness is normal. The left ventricular ejection fraction is 55%. Systolic function is normal. Wall motion is normal.    Stress ECG: Nonspecific up sloping ST depression in the inferior leads is noted. ST deviation returned to baseline after less than 1 minute of recovery. There were no arrhythmias during recovery. . The ECG was negative for ischemia. The stress ECG is negative for ischemia after maximal exercise, without reproduction of symptoms.    Stress ECG: Blood pressure demonstrated a hypertensive response and heart rate demonstrated a normal response to stress. The patient's heart rate recovery was normal.    Peak Stress Echo: Left ventricle cavity has normal reduction in size at peak stress. The left ventricle systolic function is normal at peak stress. The peak stress echo showed normal wall motion.    Echo Post Impression: The study is normal, after maximal exercise. Heart rate had decreased to below target heart rate at time of image acquisition which limits interpretation. Clinical correlation  recommended.    Results for orders placed during the hospital encounter of 02/01/23    Stress test only, exercise    Interpretation Summary    Stress ECG: Horizontal ST depression 1 mm  in the inferior leads at peak exercise is noted. This resolved during recovery.  Clinical correlation is suggested. The ECG was positive for ischemia. Further cardiac evaluation as clinically indicated is recommended.      Medications:    Current Outpatient Medications:     atorvastatin (LIPITOR) 20 mg tablet, TAKE 1 TABLET BY MOUTH EVERYDAY AT BEDTIME, Disp: 90 tablet, Rfl: 3    lisinopril (ZESTRIL) 20 mg tablet, TAKE 1 TABLET BY MOUTH EVERY DAY, Disp: 90 tablet, Rfl: 0    metoprolol succinate (TOPROL-XL) 50 mg 24 hr tablet, Take 1 tablet (50 mg total) by mouth daily, Disp: 90 tablet, Rfl: 0    triamcinolone (KENALOG) 0.1 % cream, Apply topically 2 (two) times a day, Disp: 80 g, Rfl: 3    venlafaxine (EFFEXOR-XR) 37.5 mg 24 hr capsule, TAKE 1 CAPSULE BY MOUTH EVERY DAY, Disp: 90 capsule, Rfl: 1      Allergies:  No Known Allergies      Assessment and Plan:  1. Essential hypertension  BP stable.  Continue metoprolol succinate 50 mg daily and lisinopril 20 mg daily.  Medication compliance stressed    2. Mixed hyperlipidemia  Continue atorvastatin 20 mg daily along with diet control.  His PCP closely monitors his blood work    3. Anxiety  Follow-up with PCP for management    4. Caffeine abuse (HCC)  Discussed caffeine intake in controlled amounts    5. Obesity (BMI 30-39.9)  Try to lose weight    6. Encounter for screening colonoscopy    Recommend aggressive risk factor modification and therapeutic lifestyle changes.  Low-salt, low-calorie, low-fat, low-cholesterol diet with regular exercise and to optimize weight.  I will defer the ordering and monitoring of necessity lab studies to you, but I am available and happy to review and manage any of the data at your request in the future.    Discussed concepts of atherosclerosis, including  signs and symptoms of cardiac disease.    Previous studies were reviewed.    Safety measures were reviewed.  Questions were entertained and answered.  Patient was advised to report any problems requiring medical attention.    Follow-up with PCP and appropriate specialist and lab work as discussed.    Return for follow up visit as scheduled or earlier, if needed.  Thank you for allowing me to participate in the care and evaluation of your patient.  Should you have any questions, please feel free to contact me.    Bogdan Raymundo MD  5/22/2024,10:52 AM

## 2024-05-30 ENCOUNTER — TELEPHONE (OUTPATIENT)
Age: 44
End: 2024-05-30

## 2024-05-30 NOTE — TELEPHONE ENCOUNTER
05/30/24  Screened by: Magalie Boyle    Referring Provider     Pre- Screening:     There is no height or weight on file to calculate BMI.  Has patient been referred for a routine screening Colonoscopy? yes  Is the patient between 45-75 years old? yes      Previous Colonoscopy no   If yes:    Date:     Facility:     Reason:         Does the patient want to see a Gastroenterologist prior to their procedure OR are they having any GI symptoms? no    Has the patient been hospitalized or had abdominal surgery in the past 6 months? no    Does the patient use supplemental oxygen? no    Does the patient take Coumadin, Lovenox, Plavix, Elliquis, Xarelto, or other blood thinning medication? no    Has the patient had a stroke, cardiac event, or stent placed in the past year? no    If patient is between 45yrs - 49yrs, please advise patient that we will have to confirm benefits & coverage with their insurance company for a routine screening colonoscopy.

## 2024-07-16 DIAGNOSIS — I10 ESSENTIAL HYPERTENSION: ICD-10-CM

## 2024-07-17 RX ORDER — LISINOPRIL 20 MG/1
TABLET ORAL
Qty: 90 TABLET | Refills: 0 | Status: SHIPPED | OUTPATIENT
Start: 2024-07-17

## 2024-07-29 DIAGNOSIS — I10 ESSENTIAL HYPERTENSION: ICD-10-CM

## 2024-07-29 RX ORDER — METOPROLOL SUCCINATE 50 MG/1
50 TABLET, EXTENDED RELEASE ORAL DAILY
Qty: 100 TABLET | Refills: 1 | Status: SHIPPED | OUTPATIENT
Start: 2024-07-29

## 2024-07-29 NOTE — TELEPHONE ENCOUNTER
Reason for call: Patient is totally out of medication   [x] Refill   [] Prior Auth  [] Other:     Office:   [] PCP/Provider -   [x] Specialty/Provider - Cardio Ronald     Medication: metoprolol    Dose/Frequency: 50 mg daily     Quantity: 90D supply     Pharmacy: [facility] [address] on file     Does the patient have enough for 3 days?   [] Yes   [x] No - Send as HP to POD

## 2024-08-28 ENCOUNTER — OFFICE VISIT (OUTPATIENT)
Dept: FAMILY MEDICINE CLINIC | Facility: CLINIC | Age: 44
End: 2024-08-28
Payer: COMMERCIAL

## 2024-08-28 VITALS
WEIGHT: 210 LBS | SYSTOLIC BLOOD PRESSURE: 122 MMHG | HEIGHT: 68 IN | HEART RATE: 66 BPM | BODY MASS INDEX: 31.83 KG/M2 | OXYGEN SATURATION: 98 % | DIASTOLIC BLOOD PRESSURE: 70 MMHG

## 2024-08-28 DIAGNOSIS — E78.2 MIXED HYPERLIPIDEMIA: ICD-10-CM

## 2024-08-28 DIAGNOSIS — M25.511 BILATERAL SHOULDER PAIN, UNSPECIFIED CHRONICITY: ICD-10-CM

## 2024-08-28 DIAGNOSIS — M25.512 BILATERAL SHOULDER PAIN, UNSPECIFIED CHRONICITY: ICD-10-CM

## 2024-08-28 DIAGNOSIS — I10 ESSENTIAL HYPERTENSION: ICD-10-CM

## 2024-08-28 DIAGNOSIS — Z12.11 SCREENING FOR COLON CANCER: Primary | ICD-10-CM

## 2024-08-28 PROBLEM — F32.A DEPRESSION: Status: RESOLVED | Noted: 2021-08-04 | Resolved: 2024-08-28

## 2024-08-28 PROCEDURE — 99214 OFFICE O/P EST MOD 30 MIN: CPT | Performed by: FAMILY MEDICINE

## 2024-08-28 NOTE — PROGRESS NOTES
Ambulatory Visit  Name: Isac Paz      : 1980      MRN: [id number]  Encounter Provider: PITA Bush  Encounter Date: 2024   Encounter department: [facility] [address] [place] [address]    Assessment & Plan   1. Screening for colon cancer  -     Ambulatory referral to Gastroenterology; Future  2. Bilateral shoulder pain, unspecified chronicity  Comments:  Discussed potential for patient recommended eval and treat physical therapy program  Orders:  -     Ambulatory Referral to Physical Therapy; Future  3. Essential hypertension  Assessment & Plan:  Table, within parameters continue current lisinopril and metoprolol, encouraged low-salt sodium diet weight loss strategies exercise program  4. Mixed hyperlipidemia  Assessment & Plan:  Stable, without myalgias continue statin obtain updated lipid profile      Depression Screening and Follow-up Plan: Patient was screened for depression during today's encounter. They screened negative with a PHQ-9 score of 0.      History of Present Illness     C/o shoulder pain   Long term issue, lack of flexibility  Denies any specific injury  Confined to shoulder movements nonradicular  Negative associated symptoms negative shortness of breath difficulty reviewed and negative chest pain  Negative changes in bowel or bladder habit negative weight gain loss  Admittedly no exercise program   Would like Pt program   Blood pressure and cholesterol medicines    Recommendations cardiology  Will need referral for GI colon cancer screening    Shoulder Pain   The pain is present in the left shoulder and right shoulder. This is a chronic problem. The current episode started more than 1 year ago. The problem has been waxing and waning. The quality of the pain is described as aching. Pertinent negatives include no fever or numbness. He has tried nothing for the symptoms. Family history does not include gout or rheumatoid arthritis. There is no history of  "diabetes, gout, osteoarthritis or rheumatoid arthritis.       Review of Systems   Constitutional:  Negative for appetite change, chills, fever and unexpected weight change.   HENT:  Negative for congestion, dental problem, ear pain, hearing loss, postnasal drip, rhinorrhea, sinus pressure, sinus pain, sneezing, sore throat, tinnitus and voice change.    Eyes:  Negative for visual disturbance.   Respiratory:  Negative for apnea, cough, chest tightness and shortness of breath.    Cardiovascular:  Negative for chest pain, palpitations and leg swelling.   Gastrointestinal:  Negative for abdominal pain, blood in stool, constipation, diarrhea, nausea and vomiting.   Endocrine: Negative for cold intolerance, heat intolerance, polydipsia, polyphagia and polyuria.   Genitourinary:  Negative for decreased urine volume, difficulty urinating, dysuria, frequency and hematuria.   Musculoskeletal:  Positive for arthralgias. Negative for back pain, gait problem, gout, joint swelling and myalgias.   Skin:  Negative for color change, rash and wound.   Allergic/Immunologic: Negative for environmental allergies and food allergies.   Neurological:  Negative for dizziness, syncope, weakness, light-headedness, numbness and headaches.   Hematological:  Negative for adenopathy. Does not bruise/bleed easily.   Psychiatric/Behavioral:  Negative for sleep disturbance and suicidal ideas. The patient is not nervous/anxious.        Objective     /70   Pulse 66   Ht 5' 8\" (1.727 m)   Wt 95.3 kg (210 lb)   SpO2 98%   BMI 31.93 kg/m²     Physical Exam  Constitutional:       Appearance: He is well-developed.   HENT:      Head: Normocephalic and atraumatic.   Cardiovascular:      Rate and Rhythm: Normal rate and regular rhythm.      Heart sounds: Normal heart sounds.   Pulmonary:      Effort: Pulmonary effort is normal.      Breath sounds: Normal breath sounds.   Abdominal:      General: Bowel sounds are normal.      Palpations: Abdomen is " soft.   Musculoskeletal:         General: No tenderness.      Right shoulder: No tenderness or bony tenderness. Decreased range of motion. Normal pulse.      Left shoulder: No tenderness or bony tenderness. Decreased range of motion. Normal pulse.      Right upper arm: Normal.      Left upper arm: Normal.      Cervical back: Normal range of motion and neck supple.   Skin:     General: Skin is warm and dry.   Neurological:      Mental Status: He is alert and oriented to person, place, and time.      Deep Tendon Reflexes: Reflexes are normal and symmetric.   Psychiatric:         Behavior: Behavior normal.         Thought Content: Thought content normal.         Judgment: Judgment normal.       Administrative Statements

## 2024-08-28 NOTE — ASSESSMENT & PLAN NOTE
Table, within parameters continue current lisinopril and metoprolol, encouraged low-salt sodium diet weight loss strategies exercise program

## 2024-09-25 ENCOUNTER — EVALUATION (OUTPATIENT)
Dept: PHYSICAL THERAPY | Facility: CLINIC | Age: 44
End: 2024-09-25
Payer: COMMERCIAL

## 2024-09-25 DIAGNOSIS — M25.511 BILATERAL SHOULDER PAIN, UNSPECIFIED CHRONICITY: ICD-10-CM

## 2024-09-25 DIAGNOSIS — M25.512 BILATERAL SHOULDER PAIN, UNSPECIFIED CHRONICITY: ICD-10-CM

## 2024-09-25 PROCEDURE — 97161 PT EVAL LOW COMPLEX 20 MIN: CPT | Performed by: PHYSICAL THERAPIST

## 2024-09-25 PROCEDURE — 97110 THERAPEUTIC EXERCISES: CPT | Performed by: PHYSICAL THERAPIST

## 2024-09-25 NOTE — PROGRESS NOTES
PT Evaluation     Today's date: 2024  Patient name: Isac Paz  : 1980  MRN: [id number]  Referring provider: Bhanu Dangelo CRNP  Dx:   Encounter Diagnosis     ICD-10-CM    1. Bilateral shoulder pain, unspecified chronicity  M25.511 Ambulatory Referral to Physical Therapy    M25.512     Discussed potential for patient recommended eval and treat physical therapy program                     Assessment  Impairments: abnormal or restricted ROM, activity intolerance, impaired physical strength, lacks appropriate home exercise program and poor posture   Symptom irritability: low    Assessment details: Isac Paz is a 44 y.o. male who presents to physical therapy with diagnosis of bilateral shoulder pain. Isac presents with pain, abnormal posture, and limitations in strength, joint mobility, flexibility, and functional ability. The current limitations are affecting Isac's ability to function at prior level. He will benefit from skilled physical therapy to address the current impairments and functional limitations to enable him to return to daily activities at maximal level. Provided patient with HEP, all questions were answered. He will return in 2-3 weeks to check progress and update exercises as needed. Thank you for the referral.    Understanding of Dx/Px/POC: good     Prognosis: good    Goals  STG  Patient will decrease pain at worst to 0-1/10  Patient will demonstrate pain free bilateral shoulder AROM  Patient will be independent with basic HEP    LTG  Patient will report no pain or limitations with completing household chores  Patient will improve FOTO greater than or equal to projected score  Patient will be independent with comprehensive HEP    Plan  Patient would benefit from: skilled physical therapy  Planned modality interventions: cryotherapy and thermotherapy: hydrocollator packs    Planned therapy interventions: manual therapy, neuromuscular re-education, patient education, therapeutic  "activities, therapeutic exercise, therapeutic training and home exercise program    Frequency: 1x week  Duration in weeks: 8  Plan of Care beginning date: 2024  Plan of Care expiration date: 2024  Treatment plan discussed with: patient        Subjective Evaluation    History of Present Illness  Mechanism of injury: Isac is a 44 y.o. male presenting to physical therapy on 24 with primary complaints of bilateral shoulder pain R>L. He mentions he had a RTC strain bilaterally a few years ago and went to PT which helped. However lately his shoulders have been bothering him again. He believes it is due to the lack of exercise. He is \"on and off\" in the gym. Right now he is very busy with work which makes him more sedentary and less movement. He is able to complete his ADLs without pain but does note some discomfort occasionally with household chores that involve reaching overhead. In the mornings he feels as though his shoulders are stiff. He would like a program to complete at home to improve his shoulder tightness and pain.     Patient Goals  Patient goals for therapy: decreased pain, increased strength, return to sport/leisure activities and increased motion    Pain  Current pain ratin  At best pain ratin  At worst pain rating: 3  Location: B shoulder  Quality: dull ache and tight  Alleviating factors: stretching, exercise.  Exacerbated by: reaching overhead.    Social Support    Employment status: working ()  Hand dominance: right      Diagnostic Tests  No diagnostic tests performed  Treatments  No previous or current treatments        Objective    Posture: poor, rounded shoulders    Palpation: no tenderness noted upon examination    L Shoulder AROM: WFL pain and tightness noted at end range in all planes      R Shoulder AROM: WFL pain and tightness noted at end range in all planes     Shoulder Strength:  LEFT:  Flexion: 5/5  Abduction:  5/5  Internal Rotation (0*): " 5/5  External Rotation (0*):  5/5 with pain    RIGHT:  Flexion:  4+/5   Abduction:  4+/5  Internal Rotation (0*): 5/5  External Rotation (0*):  5/5    Special Tests:  Shannon: negative  Belly Press: negative  ER Lag Sign: negative  Empty Can: negative   Lift Off: negatvie  Andrew: negative   Speeds: negative         Precautions: hypertension      Manuals 9/25                                                                Neuro Re-Ed             No Moneys HEP            Scap Retract HEP                                                                             Ther Ex             UBE             Wall Slides flex/abd HEP            TB Row Blue HEP            TB Ext Blue HEP            Sleeper Str HEP            Doorway Pec Str HEP            IR Strap Str HEP                                                   Ther Activity                                       Gait Training                                       Modalities

## 2024-09-30 ENCOUNTER — TELEPHONE (OUTPATIENT)
Dept: GASTROENTEROLOGY | Facility: CLINIC | Age: 44
End: 2024-09-30

## 2024-09-30 NOTE — TELEPHONE ENCOUNTER
Saw previous encounter regarding scheduling procedure, noticed he wanted a Cologuard, and I see Cologuard was never scheduled. I called pt to see if he was still looking to get Cologuard, if so he will need to reach out to PITA Bush. If he'd like a colonoscopy, he can reach out to us. I left a message as he did not respond.

## 2024-10-12 DIAGNOSIS — I10 ESSENTIAL HYPERTENSION: ICD-10-CM

## 2024-10-14 RX ORDER — LISINOPRIL 20 MG/1
TABLET ORAL
Qty: 30 TABLET | Refills: 0 | Status: SHIPPED | OUTPATIENT
Start: 2024-10-14

## 2024-10-18 DIAGNOSIS — E78.2 MIXED HYPERLIPIDEMIA: ICD-10-CM

## 2024-10-18 RX ORDER — ATORVASTATIN CALCIUM 20 MG/1
20 TABLET, FILM COATED ORAL
Qty: 30 TABLET | Refills: 0 | Status: SHIPPED | OUTPATIENT
Start: 2024-10-18

## 2024-11-15 DIAGNOSIS — E78.2 MIXED HYPERLIPIDEMIA: ICD-10-CM

## 2024-11-15 DIAGNOSIS — I10 ESSENTIAL HYPERTENSION: ICD-10-CM

## 2024-11-15 RX ORDER — ATORVASTATIN CALCIUM 20 MG/1
20 TABLET, FILM COATED ORAL
Qty: 90 TABLET | Refills: 1 | Status: SHIPPED | OUTPATIENT
Start: 2024-11-15 | End: 2024-11-22 | Stop reason: SDUPTHER

## 2024-11-18 RX ORDER — LISINOPRIL 20 MG/1
20 TABLET ORAL DAILY
Qty: 90 TABLET | Refills: 1 | Status: SHIPPED | OUTPATIENT
Start: 2024-11-18 | End: 2024-11-22 | Stop reason: SDUPTHER

## 2024-11-22 ENCOUNTER — OFFICE VISIT (OUTPATIENT)
Dept: CARDIOLOGY CLINIC | Facility: CLINIC | Age: 44
End: 2024-11-22
Payer: COMMERCIAL

## 2024-11-22 VITALS
RESPIRATION RATE: 16 BRPM | OXYGEN SATURATION: 78 % | WEIGHT: 207 LBS | SYSTOLIC BLOOD PRESSURE: 154 MMHG | HEIGHT: 68 IN | BODY MASS INDEX: 31.37 KG/M2 | HEART RATE: 96 BPM | DIASTOLIC BLOOD PRESSURE: 90 MMHG

## 2024-11-22 DIAGNOSIS — E78.2 MIXED HYPERLIPIDEMIA: ICD-10-CM

## 2024-11-22 DIAGNOSIS — I10 ESSENTIAL HYPERTENSION: ICD-10-CM

## 2024-11-22 PROCEDURE — 99213 OFFICE O/P EST LOW 20 MIN: CPT | Performed by: INTERNAL MEDICINE

## 2024-11-22 RX ORDER — LISINOPRIL 20 MG/1
20 TABLET ORAL DAILY
Qty: 90 TABLET | Refills: 3 | Status: SHIPPED | OUTPATIENT
Start: 2024-11-22

## 2024-11-22 RX ORDER — ATORVASTATIN CALCIUM 20 MG/1
20 TABLET, FILM COATED ORAL
Qty: 90 TABLET | Refills: 3 | Status: SHIPPED | OUTPATIENT
Start: 2024-11-22

## 2024-11-22 RX ORDER — METOPROLOL SUCCINATE 50 MG/1
50 TABLET, EXTENDED RELEASE ORAL DAILY
Qty: 90 TABLET | Refills: 3 | Status: SHIPPED | OUTPATIENT
Start: 2024-11-22

## 2025-01-17 ENCOUNTER — OFFICE VISIT (OUTPATIENT)
Dept: FAMILY MEDICINE CLINIC | Facility: CLINIC | Age: 45
End: 2025-01-17
Payer: COMMERCIAL

## 2025-01-17 VITALS
RESPIRATION RATE: 18 BRPM | WEIGHT: 211 LBS | BODY MASS INDEX: 31.98 KG/M2 | OXYGEN SATURATION: 98 % | DIASTOLIC BLOOD PRESSURE: 84 MMHG | TEMPERATURE: 98.6 F | HEART RATE: 89 BPM | HEIGHT: 68 IN | SYSTOLIC BLOOD PRESSURE: 124 MMHG

## 2025-01-17 DIAGNOSIS — J01.00 ACUTE NON-RECURRENT MAXILLARY SINUSITIS: Primary | ICD-10-CM

## 2025-01-17 DIAGNOSIS — J02.9 SORE THROAT: ICD-10-CM

## 2025-01-17 DIAGNOSIS — Z12.11 SCREENING FOR COLON CANCER: ICD-10-CM

## 2025-01-17 LAB — S PYO AG THROAT QL: NEGATIVE

## 2025-01-17 PROCEDURE — 99213 OFFICE O/P EST LOW 20 MIN: CPT | Performed by: NURSE PRACTITIONER

## 2025-01-17 PROCEDURE — 87880 STREP A ASSAY W/OPTIC: CPT | Performed by: NURSE PRACTITIONER

## 2025-01-17 RX ORDER — METHYLPREDNISOLONE 4 MG/1
TABLET ORAL
Qty: 21 EACH | Refills: 0 | Status: SHIPPED | OUTPATIENT
Start: 2025-01-17

## 2025-01-17 NOTE — PROGRESS NOTES
Name: Isac Paz      : 1980      MRN: [id number]  Encounter Provider: PITA Cast  Encounter Date: 2025   Encounter department: [facility] [address]  :  Assessment & Plan  Acute non-recurrent maxillary sinusitis  Advised warm compresses to face, steam, saline rinses twice daily, moist/minified air, avoid irritants when possible.  Medications as prescribed.    Orders:    amoxicillin-clavulanate (AUGMENTIN) 875-125 mg per tablet; Take 1 tablet by mouth every 12 (twelve) hours for 7 days    methylPREDNISolone 4 MG tablet therapy pack; Use as directed on package    Sore throat  Rapid strep negative in office.  Will treat sinusitis and postnasal drip.  Encouraged increased hydration, soft foods, voice rest.    Orders:    POCT rapid ANTIGEN strepA    Screening for colon cancer  Patient is electing Cologuard due to not wanting to be placed under anesthesia for colonoscopy.  Cologuard ordered.    Orders:    Cologuard           History of Present Illness     Patient presents to the office for evaluation sinus pressure, sore throat, congestion.  Symptoms started 2 weeks ago with nasal congestion.  Began taking Sudafed and symptoms would subside for 1 day, but then returned.  Symptoms have been waxing and waning over the past 2 weeks.  Per patient, children at home were diagnosed with influenza 2 weeks ago, and started on Tamiflu.  Patient notes intermittent cough, worse when laying down.  Denies fever, chills, body aches, hemoptysis.      Review of Systems   Constitutional:  Negative for chills and fever.   HENT:  Positive for congestion, postnasal drip, sinus pressure and sore throat. Negative for ear pain, rhinorrhea, sinus pain, trouble swallowing and voice change.    Eyes:  Negative for photophobia and visual disturbance.   Respiratory:  Positive for cough. Negative for chest tightness and shortness of breath.    Cardiovascular:  Negative for chest pain and  "palpitations.   Gastrointestinal:  Negative for abdominal pain, nausea and vomiting.   Genitourinary:  Negative for decreased urine volume.   Musculoskeletal:  Negative for arthralgias and myalgias.   Skin:  Negative for color change and rash.   Neurological:  Negative for dizziness, weakness, light-headedness and headaches.   Hematological:  Negative for adenopathy.   Psychiatric/Behavioral:  Negative for confusion.        Objective   /84 (BP Location: Left arm, Patient Position: Sitting)   Pulse 89   Temp 98.6 °F (37 °C)   Resp 18   Ht 5' 8\" (1.727 m)   Wt 95.7 kg (211 lb)   SpO2 98%   BMI 32.08 kg/m²      Physical Exam  Vitals reviewed.   Constitutional:       General: He is not in acute distress.     Appearance: Normal appearance. He is not ill-appearing.   HENT:      Head: Normocephalic and atraumatic.      Right Ear: Tympanic membrane, ear canal and external ear normal.      Left Ear: Tympanic membrane, ear canal and external ear normal.      Nose: Congestion present.      Right Turbinates: Enlarged.      Left Turbinates: Not enlarged.      Right Sinus: Maxillary sinus tenderness present. No frontal sinus tenderness.      Left Sinus: Maxillary sinus tenderness present. No frontal sinus tenderness.      Mouth/Throat:      Mouth: Mucous membranes are moist.      Pharynx: Oropharynx is clear. Posterior oropharyngeal erythema present. No oropharyngeal exudate.   Eyes:      Conjunctiva/sclera: Conjunctivae normal.      Pupils: Pupils are equal, round, and reactive to light.   Cardiovascular:      Rate and Rhythm: Normal rate and regular rhythm.      Pulses: Normal pulses.      Heart sounds: Normal heart sounds. No murmur heard.  Pulmonary:      Effort: Pulmonary effort is normal.      Breath sounds: Normal breath sounds. No wheezing.   Musculoskeletal:         General: Normal range of motion.      Cervical back: Normal range of motion and neck supple.   Lymphadenopathy:      Cervical: No cervical " adenopathy.   Skin:     General: Skin is warm and dry.   Neurological:      General: No focal deficit present.      Mental Status: He is alert and oriented to person, place, and time.   Psychiatric:         Mood and Affect: Mood normal.         Behavior: Behavior normal.

## 2025-02-06 LAB — COLOGUARD RESULT REPORTABLE: NEGATIVE

## 2025-02-07 ENCOUNTER — RESULTS FOLLOW-UP (OUTPATIENT)
Dept: FAMILY MEDICINE CLINIC | Facility: CLINIC | Age: 45
End: 2025-02-07

## 2025-06-11 ENCOUNTER — OFFICE VISIT (OUTPATIENT)
Dept: FAMILY MEDICINE CLINIC | Facility: CLINIC | Age: 45
End: 2025-06-11
Payer: COMMERCIAL

## 2025-06-11 VITALS
SYSTOLIC BLOOD PRESSURE: 126 MMHG | WEIGHT: 219 LBS | BODY MASS INDEX: 33.19 KG/M2 | HEIGHT: 68 IN | OXYGEN SATURATION: 97 % | DIASTOLIC BLOOD PRESSURE: 74 MMHG | HEART RATE: 76 BPM

## 2025-06-11 DIAGNOSIS — I10 ESSENTIAL HYPERTENSION: ICD-10-CM

## 2025-06-11 DIAGNOSIS — Z00.00 ANNUAL PHYSICAL EXAM: Primary | ICD-10-CM

## 2025-06-11 DIAGNOSIS — Z71.6 ENCOUNTER FOR SMOKING CESSATION COUNSELING: ICD-10-CM

## 2025-06-11 DIAGNOSIS — E78.2 MIXED HYPERLIPIDEMIA: ICD-10-CM

## 2025-06-11 PROCEDURE — 99214 OFFICE O/P EST MOD 30 MIN: CPT | Performed by: FAMILY MEDICINE

## 2025-06-11 PROCEDURE — 99396 PREV VISIT EST AGE 40-64: CPT | Performed by: FAMILY MEDICINE

## 2025-06-11 RX ORDER — BUPROPION HYDROCHLORIDE 150 MG/1
150 TABLET, EXTENDED RELEASE ORAL 2 TIMES DAILY
Qty: 60 TABLET | Refills: 3 | Status: SHIPPED | OUTPATIENT
Start: 2025-06-11

## 2025-06-11 RX ORDER — ATORVASTATIN CALCIUM 20 MG/1
20 TABLET, FILM COATED ORAL
Qty: 90 TABLET | Refills: 3 | Status: SHIPPED | OUTPATIENT
Start: 2025-06-11

## 2025-06-11 RX ORDER — METOPROLOL SUCCINATE 50 MG/1
50 TABLET, EXTENDED RELEASE ORAL DAILY
Qty: 90 TABLET | Refills: 3 | Status: SHIPPED | OUTPATIENT
Start: 2025-06-11

## 2025-06-11 RX ORDER — LISINOPRIL 20 MG/1
20 TABLET ORAL DAILY
Qty: 90 TABLET | Refills: 3 | Status: SHIPPED | OUTPATIENT
Start: 2025-06-11

## 2025-06-11 NOTE — ASSESSMENT & PLAN NOTE
Tolerating statin obtain updated lipid profile continue encourage diet exercise program  Orders:    Comprehensive metabolic panel; Future    Lipid Panel with Direct LDL reflex; Future    UA w Reflex to Microscopic w Reflex to Culture; Future    atorvastatin (LIPITOR) 20 mg tablet; Take 1 tablet (20 mg total) by mouth daily at bedtime

## 2025-06-11 NOTE — ASSESSMENT & PLAN NOTE
Stable within parameters continue current care low-salt sodium diet encouraged weight loss strategies  Orders:    Comprehensive metabolic panel; Future    Lipid Panel with Direct LDL reflex; Future    UA w Reflex to Microscopic w Reflex to Culture; Future    lisinopril (ZESTRIL) 20 mg tablet; Take 1 tablet (20 mg total) by mouth daily    metoprolol succinate (TOPROL-XL) 50 mg 24 hr tablet; Take 1 tablet (50 mg total) by mouth daily

## 2025-06-11 NOTE — PROGRESS NOTES
Adult Annual Physical  Name: Isac Paz      : 1980      MRN: [id number]  Encounter Provider: PITA Bush  Encounter Date: 2025   Encounter department: [facility] [address]    :  Assessment & Plan  Annual physical exam    Orders:    Comprehensive metabolic panel; Future    Lipid Panel with Direct LDL reflex; Future    UA w Reflex to Microscopic w Reflex to Culture; Future    Essential hypertension  Stable within parameters continue current care low-salt sodium diet encouraged weight loss strategies  Orders:    Comprehensive metabolic panel; Future    Lipid Panel with Direct LDL reflex; Future    UA w Reflex to Microscopic w Reflex to Culture; Future    lisinopril (ZESTRIL) 20 mg tablet; Take 1 tablet (20 mg total) by mouth daily    metoprolol succinate (TOPROL-XL) 50 mg 24 hr tablet; Take 1 tablet (50 mg total) by mouth daily    Mixed hyperlipidemia  Tolerating statin obtain updated lipid profile continue encourage diet exercise program  Orders:    Comprehensive metabolic panel; Future    Lipid Panel with Direct LDL reflex; Future    UA w Reflex to Microscopic w Reflex to Culture; Future    atorvastatin (LIPITOR) 20 mg tablet; Take 1 tablet (20 mg total) by mouth daily at bedtime    Encounter for smoking cessation counseling  Encouraged smoking cessation program discussed and reviewed options nicotine patch reviewed indications dosings side effect profile of each  Uterine 150 mg every morning x 2 weeks increasing to twice daily dosing to call for any issues concerns or questions  Orders:    buPROPion (Wellbutrin SR) 150 mg 12 hr tablet; Take 1 tablet (150 mg total) by mouth 2 (two) times a day        Annual Physical Plan   Discussed and reviewed age-appropriate anticipatory guidance, cancer screening protocols    History of Present Illness     Adult Annual Physical:  Patient presents for annual physical. Annual     Smoking again , in past , has quit  "  1ppd past yr   Labs to screen for diabetes   Stress / tension , back of head .     Diet and Physical Activity:  - Diet/Nutrition: frequent junk food.  - Exercise: no formal exercise.    Review of Systems   Constitutional:  Negative for appetite change, chills, fever and unexpected weight change.   HENT:  Negative for congestion, dental problem, ear pain, hearing loss, postnasal drip, rhinorrhea, sinus pressure, sinus pain, sneezing, sore throat, tinnitus and voice change.    Eyes:  Negative for visual disturbance.   Respiratory:  Negative for apnea, cough, chest tightness and shortness of breath.    Cardiovascular:  Negative for chest pain, palpitations and leg swelling.   Gastrointestinal:  Negative for abdominal pain, blood in stool, constipation, diarrhea, nausea and vomiting.   Endocrine: Negative for cold intolerance, heat intolerance, polydipsia, polyphagia and polyuria.   Genitourinary:  Negative for decreased urine volume, difficulty urinating, dysuria, frequency and hematuria.   Musculoskeletal:  Negative for arthralgias, back pain, gait problem, joint swelling and myalgias.   Skin:  Negative for color change, rash and wound.   Allergic/Immunologic: Negative for environmental allergies and food allergies.   Neurological:  Negative for dizziness, syncope, weakness, light-headedness, numbness and headaches.   Hematological:  Negative for adenopathy. Does not bruise/bleed easily.   Psychiatric/Behavioral:  Negative for sleep disturbance and suicidal ideas. The patient is not nervous/anxious.          Objective   /74   Pulse 76   Ht 5' 8\" (1.727 m)   Wt 99.3 kg (219 lb)   SpO2 97%   BMI 33.30 kg/m²     Physical Exam  Constitutional:       General: He is not in acute distress.     Appearance: He is well-developed. He is not ill-appearing.   HENT:      Head: Normocephalic and atraumatic.   Neck:      Vascular: No carotid bruit.     Cardiovascular:      Rate and Rhythm: Normal rate and regular rhythm. "      Heart sounds: Normal heart sounds.   Pulmonary:      Effort: Pulmonary effort is normal.      Breath sounds: Normal breath sounds.   Abdominal:      General: Bowel sounds are normal.      Palpations: Abdomen is soft.     Musculoskeletal:         General: Normal range of motion.      Cervical back: Normal range of motion and neck supple.      Right lower leg: No edema.      Left lower leg: No edema.   Lymphadenopathy:      Cervical: No cervical adenopathy.     Skin:     General: Skin is warm and dry.     Neurological:      General: No focal deficit present.      Mental Status: He is alert and oriented to person, place, and time.      Deep Tendon Reflexes: Reflexes are normal and symmetric.     Psychiatric:         Behavior: Behavior normal.         Thought Content: Thought content normal.         Judgment: Judgment normal.

## 2025-07-16 DIAGNOSIS — Z71.6 ENCOUNTER FOR SMOKING CESSATION COUNSELING: ICD-10-CM

## 2025-07-17 RX ORDER — BUPROPION HYDROCHLORIDE 150 MG/1
150 TABLET, EXTENDED RELEASE ORAL 2 TIMES DAILY
Qty: 180 TABLET | Refills: 1 | Status: SHIPPED | OUTPATIENT
Start: 2025-07-17